# Patient Record
Sex: FEMALE | Race: OTHER | Employment: PART TIME | URBAN - METROPOLITAN AREA
[De-identification: names, ages, dates, MRNs, and addresses within clinical notes are randomized per-mention and may not be internally consistent; named-entity substitution may affect disease eponyms.]

---

## 2017-02-09 ENCOUNTER — OFFICE VISIT (OUTPATIENT)
Dept: FAMILY MEDICINE CLINIC | Facility: CLINIC | Age: 27
End: 2017-02-09

## 2017-02-09 VITALS
WEIGHT: 166 LBS | RESPIRATION RATE: 16 BRPM | DIASTOLIC BLOOD PRESSURE: 79 MMHG | HEART RATE: 78 BPM | BODY MASS INDEX: 29 KG/M2 | TEMPERATURE: 98 F | SYSTOLIC BLOOD PRESSURE: 116 MMHG

## 2017-02-09 DIAGNOSIS — J06.9 VIRAL UPPER RESPIRATORY TRACT INFECTION: Primary | ICD-10-CM

## 2017-02-09 PROCEDURE — 99212 OFFICE O/P EST SF 10 MIN: CPT | Performed by: FAMILY MEDICINE

## 2017-02-09 PROCEDURE — 99213 OFFICE O/P EST LOW 20 MIN: CPT | Performed by: FAMILY MEDICINE

## 2017-02-09 NOTE — PROGRESS NOTES
Blood pressure 116/79, pulse 78, temperature 98.1 °F (36.7 °C), temperature source Oral, resp. rate 16, weight 166 lb (75.297 kg), last menstrual period 01/21/2017, unknown if currently breastfeeding.   Patient presents today complaining of a four-day histo

## 2017-04-11 ENCOUNTER — HOSPITAL ENCOUNTER (OUTPATIENT)
Age: 27
Discharge: HOME | End: 2017-04-11
Attending: EMERGENCY MEDICINE
Payer: MEDICAID

## 2017-04-11 VITALS
HEART RATE: 64 BPM | DIASTOLIC BLOOD PRESSURE: 68 MMHG | OXYGEN SATURATION: 99 % | BODY MASS INDEX: 27.46 KG/M2 | SYSTOLIC BLOOD PRESSURE: 106 MMHG | WEIGHT: 155 LBS | HEIGHT: 63 IN | RESPIRATION RATE: 16 BRPM | TEMPERATURE: 99 F

## 2017-04-11 DIAGNOSIS — T30.0 PARTIAL THICKNESS BURNS OF MULTIPLE SITES: Primary | ICD-10-CM

## 2017-04-11 PROCEDURE — 16020 DRESS/DEBRID P-THICK BURN S: CPT

## 2017-04-11 PROCEDURE — 99213 OFFICE O/P EST LOW 20 MIN: CPT

## 2017-04-11 PROCEDURE — 99214 OFFICE O/P EST MOD 30 MIN: CPT
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
## 2017-04-12 NOTE — ED NOTES
silvadine dressing applied with thin layer of silvadine and gauze.   Patient instructed on dressing changes and would care

## 2017-04-17 ENCOUNTER — TELEPHONE (OUTPATIENT)
Dept: OBGYN CLINIC | Facility: CLINIC | Age: 27
End: 2017-04-17

## 2017-04-18 RX ORDER — NORGESTIMATE AND ETHINYL ESTRADIOL 7DAYSX3 28
1 KIT ORAL DAILY
Qty: 1 PACKAGE | Refills: 1 | Status: SHIPPED | OUTPATIENT
Start: 2017-04-18 | End: 2017-05-08

## 2017-04-18 NOTE — TELEPHONE ENCOUNTER
Tried to leave message, no voicemail. Ira Mane ordered with 1 refill per [facility] instructions.

## 2017-04-20 NOTE — TELEPHONE ENCOUNTER
No voicemail. Could not leave message. Attempted to call pt 3 times. No voicemail set up. Send letter or discontinue calling patient?

## 2017-05-08 ENCOUNTER — TELEPHONE (OUTPATIENT)
Dept: OBGYN CLINIC | Facility: CLINIC | Age: 27
End: 2017-05-08

## 2017-05-08 RX ORDER — NORGESTIMATE AND ETHINYL ESTRADIOL 7DAYSX3 28
1 KIT ORAL DAILY
Qty: 1 PACKAGE | Refills: 0 | Status: SHIPPED | OUTPATIENT
Start: 2017-05-08 | End: 2017-06-05

## 2017-05-08 NOTE — TELEPHONE ENCOUNTER
OFFICE RECEIVED FAX FROM [facility] FOR SECOND RX REQUEST FOR TRINESSA TABLETS 28S. PLACED IN NURSE BIN TO BE REVIEWED.

## 2017-05-08 NOTE — TELEPHONE ENCOUNTER
Patient is overdue for her annual exam.  My message last month was to give her a refill until her annual exam.  May send in one pack but no further refills will be given until she comes in for her annual exam.

## 2017-06-08 RX ORDER — CLOBETASOL PROPIONATE 0.5 MG/G
CREAM TOPICAL
Qty: 45 G | Refills: 1 | Status: SHIPPED | OUTPATIENT
Start: 2017-06-08 | End: 2018-05-25

## 2017-07-12 ENCOUNTER — OFFICE VISIT (OUTPATIENT)
Dept: DERMATOLOGY CLINIC | Facility: CLINIC | Age: 27
End: 2017-07-12

## 2017-07-12 DIAGNOSIS — L40.8 OTHER PSORIASIS: Primary | ICD-10-CM

## 2017-07-12 PROCEDURE — 99212 OFFICE O/P EST SF 10 MIN: CPT | Performed by: DERMATOLOGY

## 2017-07-12 PROCEDURE — 99213 OFFICE O/P EST LOW 20 MIN: CPT | Performed by: DERMATOLOGY

## 2017-07-12 PROCEDURE — 96372 THER/PROPH/DIAG INJ SC/IM: CPT | Performed by: DERMATOLOGY

## 2017-07-12 RX ORDER — AMPICILLIN 500 MG/1
500 CAPSULE ORAL 4 TIMES DAILY
Qty: 28 CAPSULE | Refills: 1 | Status: SHIPPED | OUTPATIENT
Start: 2017-07-12 | End: 2017-07-12

## 2017-07-12 RX ORDER — AMPICILLIN 500 MG/1
500 CAPSULE ORAL 2 TIMES DAILY
Qty: 28 CAPSULE | Refills: 1 | Status: SHIPPED | OUTPATIENT
Start: 2017-07-12 | End: 2018-05-25 | Stop reason: ALTCHOICE

## 2017-07-12 RX ORDER — MONTELUKAST SODIUM 10 MG/1
10 TABLET ORAL NIGHTLY
Qty: 30 TABLET | Refills: 11 | Status: SHIPPED | OUTPATIENT
Start: 2017-07-12 | End: 2018-05-25

## 2017-07-12 RX ORDER — CLOBETASOL PROPIONATE 0.46 MG/ML
1 SOLUTION TOPICAL 2 TIMES DAILY
Qty: 50 ML | Refills: 6 | Status: SHIPPED | OUTPATIENT
Start: 2017-07-12 | End: 2018-05-25

## 2017-07-12 RX ORDER — TRIAMCINOLONE ACETONIDE 40 MG/ML
40 INJECTION, SUSPENSION INTRA-ARTICULAR; INTRAMUSCULAR ONCE
Status: COMPLETED | OUTPATIENT
Start: 2017-07-12 | End: 2017-07-12

## 2017-07-12 RX ORDER — TRIAMCINOLONE ACETONIDE 40 MG/ML
40 INJECTION, SUSPENSION INTRA-ARTICULAR; INTRAMUSCULAR ONCE
Status: CANCELLED | OUTPATIENT
Start: 2017-07-12 | End: 2017-07-12

## 2017-07-12 RX ADMIN — TRIAMCINOLONE ACETONIDE 40 MG: 40 INJECTION, SUSPENSION INTRA-ARTICULAR; INTRAMUSCULAR at 19:11:00

## 2017-07-30 NOTE — PROGRESS NOTES
Lissette Matta is a 23year old female. Patient presents with:  Psoriasis: established pt, presents with exacerbation of psoriasis to \"whole body spreading to face\". pt using TAC BID with mild improvement.  pt also using clobetasol sol BID for scalp Allergies    Past Medical History:   Diagnosis Date   • Asthma     hospitalized at age 10   • Decorative tattoo    • Pregnancy    • Pregnancy    • Psoriasis      Past Surgical History:  2011:   2013:     Social History  Social History   Katherine Cook acute distress. Erythematous scaling plaques noted over the elbows knees, scattered over    Nails normal.  No joint swelling or deformity noted. No other suspicious lesions. Assessment /   Plan:   Psoriasis. Plaque-type.   10% body surface involvement Referral Orders:  No orders of the defined types were placed in this encounter. 7/30/2017  Britney Myrick      The patient indicates understanding of these issues and agrees to the plan.   The patient is asked to return as noted in follow-up as a

## 2017-09-25 ENCOUNTER — TELEPHONE (OUTPATIENT)
Dept: FAMILY MEDICINE CLINIC | Facility: CLINIC | Age: 27
End: 2017-09-25

## 2017-09-25 NOTE — TELEPHONE ENCOUNTER
Pt would like to know what her blood type is. Per pt she needs this in order to fill out a form and would like this information asap.

## 2017-09-26 NOTE — TELEPHONE ENCOUNTER
Shilpi 16, please advise on blood type order. Patient called on 09/23/2017, RALPH was questioning order. Informed RALPH testing is usually only covered when patient is having surgery. Advised patient to schedule an appt since she has not seen Shilpi 16 since 2015.

## 2018-04-26 ENCOUNTER — OFFICE VISIT (OUTPATIENT)
Dept: OBGYN CLINIC | Facility: CLINIC | Age: 28
End: 2018-04-26

## 2018-04-26 VITALS
DIASTOLIC BLOOD PRESSURE: 63 MMHG | SYSTOLIC BLOOD PRESSURE: 105 MMHG | WEIGHT: 159 LBS | BODY MASS INDEX: 28 KG/M2 | HEART RATE: 66 BPM

## 2018-04-26 DIAGNOSIS — B96.89 BV (BACTERIAL VAGINOSIS): ICD-10-CM

## 2018-04-26 DIAGNOSIS — N76.0 BV (BACTERIAL VAGINOSIS): ICD-10-CM

## 2018-04-26 DIAGNOSIS — Z01.411 ENCOUNTER FOR GYNECOLOGICAL EXAMINATION WITH ABNORMAL FINDING: ICD-10-CM

## 2018-04-26 DIAGNOSIS — N89.8 VAGINAL DISCHARGE: Primary | ICD-10-CM

## 2018-04-26 PROCEDURE — 99395 PREV VISIT EST AGE 18-39: CPT | Performed by: OBSTETRICS & GYNECOLOGY

## 2018-04-26 RX ORDER — NORGESTIMATE AND ETHINYL ESTRADIOL 7DAYSX3 28
1 KIT ORAL DAILY
Qty: 1 PACKAGE | Refills: 11 | Status: SHIPPED | OUTPATIENT
Start: 2018-04-26 | End: 2018-05-24

## 2018-04-26 RX ORDER — METRONIDAZOLE 500 MG/1
500 TABLET ORAL 2 TIMES DAILY
Qty: 14 TABLET | Refills: 0 | Status: SHIPPED | OUTPATIENT
Start: 2018-04-26 | End: 2018-05-25 | Stop reason: ALTCHOICE

## 2018-04-26 NOTE — PROGRESS NOTES
HPI:    Patient ID: Judi Rodriguez is a 33year old female. HPI  Patient here for routine exam.  Reports vaginal discharge with odor for four months. Has tried OTC medication but only helps for two or three days.  Desires to restart [employer] and [place] but states No adenopathy. Abdominal: Soft. Bowel sounds are normal. She exhibits no mass. There is no tenderness. Genitourinary: Uterus normal. No vaginal discharge found. Genitourinary Comments: Cervix:  No CMT. No lesions. Adnexa:  No adnexal masses.   NT.

## 2018-04-27 ENCOUNTER — TELEPHONE (OUTPATIENT)
Dept: OBGYN CLINIC | Facility: CLINIC | Age: 28
End: 2018-04-27

## 2018-04-27 NOTE — TELEPHONE ENCOUNTER
----- Message from Sheila Jansen MD sent at 4/27/2018  4:05 AM CDT -----  Vaginal Culture + for BV. Patient already treated with Flagyl. Notify patient.

## 2018-05-25 ENCOUNTER — OFFICE VISIT (OUTPATIENT)
Dept: FAMILY MEDICINE CLINIC | Facility: CLINIC | Age: 28
End: 2018-05-25

## 2018-05-25 VITALS
DIASTOLIC BLOOD PRESSURE: 66 MMHG | WEIGHT: 155 LBS | BODY MASS INDEX: 27.46 KG/M2 | HEART RATE: 65 BPM | SYSTOLIC BLOOD PRESSURE: 103 MMHG | HEIGHT: 63 IN

## 2018-05-25 DIAGNOSIS — Z00.00 ROUTINE PHYSICAL EXAMINATION: Primary | ICD-10-CM

## 2018-05-25 DIAGNOSIS — F32.A DEPRESSION, UNSPECIFIED DEPRESSION TYPE: ICD-10-CM

## 2018-05-25 DIAGNOSIS — L40.9 PSORIASIS: ICD-10-CM

## 2018-05-25 PROCEDURE — 99395 PREV VISIT EST AGE 18-39: CPT | Performed by: FAMILY MEDICINE

## 2018-05-25 NOTE — PROGRESS NOTES
Blood pressure 103/66, pulse 65, height 5' 3\" (1.6 m), weight 155 lb (70.3 kg), not currently breastfeeding. REASON FOR VISIT:    Dayana Cantu is a 33year old female who presents for an [address]         Patient Active Problem List: Screen If high risk No components found for: PPDINDURAT       SPECIFIC DISEASE MONITORING Internal Lab or Procedure   No disease specific diagnoses    ALLERGIES:   No Known Allergies  CURRENT MEDICATIONS:     No current outpatient prescriptions on file. distress  SKIN: no rashes, no suspicious lesions  HEENT: atraumatic, normocephalic, ears and throat are clear  EYES:PERRLA, EOMI, conjunctiva are clear.     NECK: supple, no adenopathy, no bruits  CHEST: no chest tenderness  BREAST: deferred   LUNGS: clear

## 2018-08-30 ENCOUNTER — HOSPITAL ENCOUNTER (OUTPATIENT)
Age: 28
Discharge: HOME | End: 2018-08-30
Payer: MEDICAID

## 2018-08-30 VITALS
SYSTOLIC BLOOD PRESSURE: 111 MMHG | TEMPERATURE: 98 F | HEART RATE: 87 BPM | RESPIRATION RATE: 18 BRPM | OXYGEN SATURATION: 97 % | DIASTOLIC BLOOD PRESSURE: 59 MMHG

## 2018-08-30 DIAGNOSIS — J06.9 VIRAL URI: Primary | ICD-10-CM

## 2018-08-30 LAB — POCT RAPID STREP: NEGATIVE

## 2018-08-30 PROCEDURE — 87081 CULTURE SCREEN ONLY: CPT | Performed by: NURSE PRACTITIONER

## 2018-08-30 PROCEDURE — 87430 STREP A AG IA: CPT | Performed by: NURSE PRACTITIONER

## 2018-08-30 PROCEDURE — 99204 OFFICE O/P NEW MOD 45 MIN: CPT

## 2018-08-30 PROCEDURE — 99203 OFFICE O/P NEW LOW 30 MIN: CPT

## 2018-08-30 NOTE — ED PROVIDER NOTES
Hanane Reagan is a 33year old female who presents with for chief complaint of fever, chills, nasal congestion, coryza, rhinorrhea, sore throat, cough, headache, myalgias, fatigue, malaise, tiredness along with nausea. Onset of symptoms was yesterday.   A adenopathy  Lungs: clear to auscultation bilaterally. No chest wall retractions. No respiratory distress.  No tachypnea noted  Heart: S1, S2 normal, no murmur, click, rub or gallop, regular rate and rhythm  Abdomen: soft, non-tender; bowel sounds normal; no

## 2018-08-30 NOTE — ED INITIAL ASSESSMENT (HPI)
Patient presents with cc of fever (102 max this am),headache,sinus congestion and chills/bodyaches x 2 days. Denies sore throat or cough. Niece who lives with her also ill.

## 2018-10-22 ENCOUNTER — TELEPHONE (OUTPATIENT)
Dept: FAMILY MEDICINE CLINIC | Facility: CLINIC | Age: 28
End: 2018-10-22

## 2018-10-22 DIAGNOSIS — M79.673 PAIN OF FOOT, UNSPECIFIED LATERALITY: Primary | ICD-10-CM

## 2018-10-22 NOTE — TELEPHONE ENCOUNTER
Johann Lebron Havelock,     Patient called stating she was told to make an appointment with a Podiatry as a hospital follow up. Please enter an order as she doesn't require referrals. Reason for treatment is trauma to the foot/sprang.      Thank you,   Jennifer Turner

## 2018-10-24 ENCOUNTER — HOSPITAL ENCOUNTER (OUTPATIENT)
Dept: GENERAL RADIOLOGY | Age: 28
Discharge: HOME | End: 2018-10-24
Attending: PODIATRIST
Payer: MEDICAID

## 2018-10-24 ENCOUNTER — OFFICE VISIT (OUTPATIENT)
Dept: PODIATRY CLINIC | Facility: CLINIC | Age: 28
End: 2018-10-24
Payer: MEDICAID

## 2018-10-24 DIAGNOSIS — S92.301A FRACTURE OF UNSPECIFIED METATARSAL BONE(S), RIGHT FOOT, INITIAL ENCOUNTER FOR CLOSED FRACTURE: Primary | ICD-10-CM

## 2018-10-24 DIAGNOSIS — S92.301A FRACTURE OF UNSPECIFIED METATARSAL BONE(S), RIGHT FOOT, INITIAL ENCOUNTER FOR CLOSED FRACTURE: ICD-10-CM

## 2018-10-24 PROCEDURE — 99203 OFFICE O/P NEW LOW 30 MIN: CPT | Performed by: PODIATRIST

## 2018-10-24 PROCEDURE — L4386 NON-PNEUM WALK BOOT PRE CST: HCPCS | Performed by: PODIATRIST

## 2018-10-24 PROCEDURE — 73630 X-RAY EXAM OF FOOT: CPT | Performed by: PODIATRIST

## 2018-10-24 RX ORDER — NORGESTIMATE-ETHINYL ESTRADIOL 7DAYSX3 28
TABLET ORAL
Refills: 9 | COMMUNITY
Start: 2018-09-17 | End: 2019-06-13

## 2018-10-24 RX ORDER — HYDROCODONE BITARTRATE AND ACETAMINOPHEN 5; 325 MG/1; MG/1
TABLET ORAL
Refills: 0 | COMMUNITY
Start: 2018-10-20 | End: 2019-06-13

## 2018-10-29 NOTE — PROGRESS NOTES
Eelna Kaufman is a 33year old female. Patient presents with:   Foot Pain: right foot 10/10 at night since saturday, fall pt went to [facility] ED        HPI:   This very pleasant 64-year-old female patient presents to the clinic with a chief complaint o Substance and Sexual Activity      Alcohol use: No        Alcohol/week: 0.0 oz      Drug use: No      Sexual activity: Yes        Birth control/protection: Condom    Other Topics      Concerns:         Service: Not Asked        Blood Transfusions: displaced fifth metatarsal fracture at the neck area. Alignment is reasonable should heal should not be a problem for the patient in the future if it heals in this position.     ASSESSMENT AND PLAN:   Diagnoses and all orders for this visit:    Fracture of

## 2018-11-14 ENCOUNTER — HOSPITAL ENCOUNTER (OUTPATIENT)
Dept: GENERAL RADIOLOGY | Age: 28
Discharge: HOME | End: 2018-11-14
Attending: PODIATRIST
Payer: MEDICAID

## 2018-11-14 ENCOUNTER — OFFICE VISIT (OUTPATIENT)
Dept: PODIATRY CLINIC | Facility: CLINIC | Age: 28
End: 2018-11-14
Payer: MEDICAID

## 2018-11-14 DIAGNOSIS — S92.301A FRACTURE OF UNSPECIFIED METATARSAL BONE(S), RIGHT FOOT, INITIAL ENCOUNTER FOR CLOSED FRACTURE: ICD-10-CM

## 2018-11-14 DIAGNOSIS — S92.301A FRACTURE OF UNSPECIFIED METATARSAL BONE(S), RIGHT FOOT, INITIAL ENCOUNTER FOR CLOSED FRACTURE: Primary | ICD-10-CM

## 2018-11-14 PROCEDURE — 99213 OFFICE O/P EST LOW 20 MIN: CPT | Performed by: PODIATRIST

## 2018-11-14 PROCEDURE — 73630 X-RAY EXAM OF FOOT: CPT | Performed by: PODIATRIST

## 2018-11-18 NOTE — PROGRESS NOTES
Renate Martines is a 33year old female. Patient presents with: Foot Pain: Pt is here for right foot pain with a stress fracture 5th right. Pt on crutches with a cam boot. 0 pain with boot on Pain level 4 without boot on.  Pt to go down for xrays of the ri Alcohol/week: 0.0 oz      Drug use: No      Sexual activity: Yes        Birth control/protection: Condom    Other Topics      Concerns:         Service: Not Asked        Blood Transfusions: Not Asked        Caffeine Concern: Yes          coffee,s taken they look good. Healing is progressing the patient may now ambulate in the boot without crutches or use 1 crutch for an assist if she desires.   She can continue to use the Ace wrap for some mild compression and I will follow-up with her again in 3 w

## 2018-12-05 ENCOUNTER — HOSPITAL ENCOUNTER (OUTPATIENT)
Dept: GENERAL RADIOLOGY | Age: 28
Discharge: HOME | End: 2018-12-05
Attending: PODIATRIST
Payer: MEDICAID

## 2018-12-05 ENCOUNTER — OFFICE VISIT (OUTPATIENT)
Dept: PODIATRY CLINIC | Facility: CLINIC | Age: 28
End: 2018-12-05
Payer: MEDICAID

## 2018-12-05 DIAGNOSIS — S92.301A FRACTURE OF UNSPECIFIED METATARSAL BONE(S), RIGHT FOOT, INITIAL ENCOUNTER FOR CLOSED FRACTURE: Primary | ICD-10-CM

## 2018-12-05 DIAGNOSIS — S92.301A FRACTURE OF UNSPECIFIED METATARSAL BONE(S), RIGHT FOOT, INITIAL ENCOUNTER FOR CLOSED FRACTURE: ICD-10-CM

## 2018-12-05 PROCEDURE — 73630 X-RAY EXAM OF FOOT: CPT | Performed by: PODIATRIST

## 2018-12-05 PROCEDURE — 99213 OFFICE O/P EST LOW 20 MIN: CPT | Performed by: PODIATRIST

## 2018-12-05 RX ORDER — CEPHALEXIN 500 MG/1
CAPSULE ORAL
Refills: 0 | COMMUNITY
Start: 2018-11-27 | End: 2019-04-30 | Stop reason: ALTCHOICE

## 2018-12-09 NOTE — PROGRESS NOTES
Marilyn Dangelo is a 33year old female. Patient presents with:  Fracture: right foot -- rates pain 2-3/05 with certain movements.          HPI:   Patient returns to clinic for checkup on her fifth metatarsal fracture which is located around the anatomical REVIEW OF SYSTEMS:   Review of Systems  GENERAL HEALTH: feels well otherwise  SKIN: denies any unusual skin lesions or rashes  RESPIRATORY: denies shortness of breath with exertion  CARDIOVASCULAR: denies chest pain on exertion  GI: denies abdominal pa

## 2019-01-05 ENCOUNTER — OFFICE VISIT (OUTPATIENT)
Dept: PODIATRY CLINIC | Facility: CLINIC | Age: 29
End: 2019-01-05
Payer: MEDICAID

## 2019-01-05 DIAGNOSIS — S92.301A FRACTURE OF UNSPECIFIED METATARSAL BONE(S), RIGHT FOOT, INITIAL ENCOUNTER FOR CLOSED FRACTURE: Primary | ICD-10-CM

## 2019-01-05 PROCEDURE — 99213 OFFICE O/P EST LOW 20 MIN: CPT | Performed by: PODIATRIST

## 2019-01-07 NOTE — PROGRESS NOTES
Rosaura Roblero is a 33year old female.  Patient presents with:  Fracture: R foot f/u - tates she is good, it hurts when she stays on her feet longer and is rated as 7/10 on and off         HPI:   This patient returns for follow-up still experiencing some Pt has a pacemaker: No        Pt has a defibrillator: No        Reaction to local anesthetic: No          REVIEW OF SYSTEMS:   Review of Systems  GENERAL HEALTH: feels well otherwise  SKIN: denies any unusual skin lesions or rashes  RESPIRATORY: denies esau

## 2019-02-07 ENCOUNTER — TELEPHONE (OUTPATIENT)
Dept: ORTHOPEDICS CLINIC | Facility: CLINIC | Age: 29
End: 2019-02-07

## 2019-02-07 NOTE — TELEPHONE ENCOUNTER
Outstanding orders for an xray of the foot. Foot fracture Pts appointment 1-2-19. Was to follow up if pain continued in 4 weeks with repeat xray. No follow up as of yet.

## 2019-02-09 ENCOUNTER — OFFICE VISIT (OUTPATIENT)
Dept: FAMILY MEDICINE CLINIC | Facility: CLINIC | Age: 29
End: 2019-02-09
Payer: MEDICAID

## 2019-02-09 ENCOUNTER — TELEPHONE (OUTPATIENT)
Dept: FAMILY MEDICINE CLINIC | Facility: CLINIC | Age: 29
End: 2019-02-09

## 2019-02-09 ENCOUNTER — APPOINTMENT (OUTPATIENT)
Dept: LAB | Age: 29
End: 2019-02-09
Attending: FAMILY MEDICINE
Payer: MEDICAID

## 2019-02-09 VITALS
HEART RATE: 65 BPM | HEIGHT: 63 IN | WEIGHT: 152.25 LBS | SYSTOLIC BLOOD PRESSURE: 106 MMHG | DIASTOLIC BLOOD PRESSURE: 69 MMHG | BODY MASS INDEX: 26.98 KG/M2

## 2019-02-09 DIAGNOSIS — M54.50 ACUTE BILATERAL LOW BACK PAIN WITHOUT SCIATICA: ICD-10-CM

## 2019-02-09 DIAGNOSIS — M54.50 ACUTE BILATERAL LOW BACK PAIN WITHOUT SCIATICA: Primary | ICD-10-CM

## 2019-02-09 LAB — HCG SERPL QL: NEGATIVE

## 2019-02-09 PROCEDURE — 99212 OFFICE O/P EST SF 10 MIN: CPT | Performed by: FAMILY MEDICINE

## 2019-02-09 PROCEDURE — 99213 OFFICE O/P EST LOW 20 MIN: CPT | Performed by: FAMILY MEDICINE

## 2019-02-09 PROCEDURE — 84703 CHORIONIC GONADOTROPIN ASSAY: CPT

## 2019-02-09 PROCEDURE — 36415 COLL VENOUS BLD VENIPUNCTURE: CPT

## 2019-02-09 RX ORDER — METHYLPREDNISOLONE 4 MG/1
TABLET ORAL
Qty: 1 KIT | Refills: 1 | Status: SHIPPED | OUTPATIENT
Start: 2019-02-09 | End: 2019-04-30 | Stop reason: ALTCHOICE

## 2019-02-09 RX ORDER — METHYLPREDNISOLONE 4 MG/1
TABLET ORAL
Qty: 1 KIT | Refills: 1 | Status: SHIPPED | OUTPATIENT
Start: 2019-02-09 | End: 2019-02-09

## 2019-02-09 NOTE — PROGRESS NOTES
Blood pressure 106/69, pulse 65, height 5' 3\" (1.6 m), weight 152 lb 4 oz (69.1 kg), not currently breastfeeding. Patient presents today complaining of right buttock and thigh pain for the past month.   She had broken her foot 2 months ago and was weari

## 2019-02-12 NOTE — TELEPHONE ENCOUNTER
Patient returned call advised of result notes per  [facility], patient verbalized understanding, no further questions.

## 2019-04-30 ENCOUNTER — OFFICE VISIT (OUTPATIENT)
Dept: FAMILY MEDICINE CLINIC | Facility: CLINIC | Age: 29
End: 2019-04-30
Payer: MEDICAID

## 2019-04-30 VITALS
HEART RATE: 70 BPM | WEIGHT: 154.69 LBS | TEMPERATURE: 98 F | HEIGHT: 63 IN | DIASTOLIC BLOOD PRESSURE: 65 MMHG | BODY MASS INDEX: 27.41 KG/M2 | RESPIRATION RATE: 15 BRPM | SYSTOLIC BLOOD PRESSURE: 103 MMHG

## 2019-04-30 DIAGNOSIS — K64.9 HEMORRHOIDS, UNSPECIFIED HEMORRHOID TYPE: Primary | ICD-10-CM

## 2019-04-30 PROCEDURE — 99212 OFFICE O/P EST SF 10 MIN: CPT | Performed by: FAMILY MEDICINE

## 2019-04-30 NOTE — PROGRESS NOTES
Blood pressure 103/65, pulse 70, temperature 98 °F (36.7 °C), temperature source Oral, resp. rate 15, height 5' 3\" (1.6 m), weight 154 lb 11.2 oz (70.2 kg), not currently breastfeeding.     COMPLAINING OF LARGE HEMORRHOID NO PAIN AT THIS TIME SHE HAS HAD I

## 2019-06-13 ENCOUNTER — OFFICE VISIT (OUTPATIENT)
Dept: FAMILY MEDICINE CLINIC | Facility: CLINIC | Age: 29
End: 2019-06-13
Payer: MEDICAID

## 2019-06-13 VITALS
HEIGHT: 63 IN | DIASTOLIC BLOOD PRESSURE: 69 MMHG | SYSTOLIC BLOOD PRESSURE: 109 MMHG | WEIGHT: 154 LBS | BODY MASS INDEX: 27.29 KG/M2 | HEART RATE: 64 BPM

## 2019-06-13 DIAGNOSIS — M76.31 IT BAND SYNDROME, RIGHT: ICD-10-CM

## 2019-06-13 DIAGNOSIS — N89.8 VAGINAL DISCHARGE: Primary | ICD-10-CM

## 2019-06-13 DIAGNOSIS — L85.8 KERATOSIS PILARIS: ICD-10-CM

## 2019-06-13 DIAGNOSIS — R22.9 MULTIPLE SKIN NODULES: ICD-10-CM

## 2019-06-13 PROBLEM — J06.9 VIRAL UPPER RESPIRATORY TRACT INFECTION: Status: RESOLVED | Noted: 2017-02-09 | Resolved: 2019-06-13

## 2019-06-13 PROCEDURE — 99214 OFFICE O/P EST MOD 30 MIN: CPT | Performed by: NURSE PRACTITIONER

## 2019-06-13 RX ORDER — METRONIDAZOLE 500 MG/1
500 TABLET ORAL 2 TIMES DAILY
Qty: 14 TABLET | Refills: 0 | Status: SHIPPED | OUTPATIENT
Start: 2019-06-13 | End: 2019-06-20

## 2019-06-13 NOTE — PROGRESS NOTES
HPI  Pt here for vaginal d/c and strong odor for the past 7 months. Pt states it is thick white-worse when walking  Notices odor to vaginal d/c. Denies vaginal irritation, itching or pain with urination. Has similar s/s last year-was treated with flagyl.  H Transportation needs:        Medical: Not on file        Non-medical: Not on file    Tobacco Use      Smoking status: Former Smoker        Types: Cigarettes        Quit date: 4/26/2016        Years since quitting: 3.1      Smokeless tobacco: Never Used No Known Allergies    Physical Exam   Nursing note and vitals reviewed. Constitutional: She appears well-developed and well-nourished. Cardiovascular: Normal rate, regular rhythm and normal heart sounds.     Pulmonary/Chest: Effort normal and breath charlene

## 2019-06-13 NOTE — PATIENT INSTRUCTIONS
Preventing Vaginal Infection  These steps can help you stay comfortable during treatment of a vaginal infection. They also help prevent vaginal infections in the future.   Keeping a healthy balance  Factors that change the normal balance in the vagina can © 2928-7634 [employer] [address] [facility] [facility] [place]. All rights reserved. This information is not intended as a substitute for professional medical care. Always follow your healthcare professional's instructions.     KERATOSIS P Teens and adults: Upper arms, thighs (front), and buttocks    Some people develop so many bumps on their skin that the bumps extend to their lower legs and forearms. Who gets keratosis pilaris?   People of all ages and races have this common skin condition A moisturizer: A cream or ointment works best. Apply it after bathing and gently massage it into the skin with keratosis pilaris 2 or 3 times a day. What causes keratosis pilaris? Keratosis pilaris is not contagious.  We get keratosis pilaris when dead Your dermatologist may also prescribe a medicine that will remove dead skin cells.  Medicine that can help often contains one of the following ingredients:    Alpha hydroxyl acid  Glycolic acid  Lactic acid  A retinoid (adapalene, retinol, tazarotene, treti About the maintenance plan: Treatment cannot cure keratosis pilaris, so you’ll need to treat your skin to keep the bumps under control. Your maintenance plan may be as simple as using the medicine twice a week instead of every day.  Another option may be to Stephanie on moisturizer: Using a keratolyic dries the skin, so you’ll want to apply a moisturizer afterwards. Dermatologists recommend using an oil-free cream or ointment to help prevent clogged pores.     You want to apply the moisturizer:    • After bathin

## 2019-06-14 NOTE — ASSESSMENT & PLAN NOTE
Gentle stretching  Ibuprofen 600 mg I po tid prn  Ice to area 20 min 4-6 times per day  Massage to the area

## 2019-06-26 ENCOUNTER — OFFICE VISIT (OUTPATIENT)
Dept: SURGERY | Facility: CLINIC | Age: 29
End: 2019-06-26
Payer: MEDICAID

## 2019-06-26 VITALS
BODY MASS INDEX: 26.58 KG/M2 | HEART RATE: 71 BPM | WEIGHT: 150 LBS | DIASTOLIC BLOOD PRESSURE: 69 MMHG | HEIGHT: 63 IN | TEMPERATURE: 97 F | SYSTOLIC BLOOD PRESSURE: 101 MMHG

## 2019-06-26 DIAGNOSIS — K64.4 ANAL SKIN TAG: ICD-10-CM

## 2019-06-26 DIAGNOSIS — K64.9 HEMORRHOIDS, UNSPECIFIED HEMORRHOID TYPE: Primary | ICD-10-CM

## 2019-06-26 DIAGNOSIS — K64.4 EXTERNAL HEMORRHOID: ICD-10-CM

## 2019-06-26 PROCEDURE — 99244 OFF/OP CNSLTJ NEW/EST MOD 40: CPT | Performed by: SURGERY

## 2019-06-26 PROCEDURE — 99212 OFFICE O/P EST SF 10 MIN: CPT | Performed by: SURGERY

## 2019-06-27 ENCOUNTER — HOSPITAL ENCOUNTER (OUTPATIENT)
Dept: ULTRASOUND IMAGING | Facility: HOSPITAL | Age: 29
Discharge: HOME | End: 2019-06-27
Attending: NURSE PRACTITIONER
Payer: MEDICAID

## 2019-06-27 DIAGNOSIS — R22.9 MULTIPLE SKIN NODULES: ICD-10-CM

## 2019-06-27 PROCEDURE — 76882 US LMTD JT/FCL EVL NVASC XTR: CPT | Performed by: NURSE PRACTITIONER

## 2019-06-28 ENCOUNTER — TELEPHONE (OUTPATIENT)
Dept: OTHER | Age: 29
End: 2019-06-28

## 2019-06-28 NOTE — H&P (VIEW-ONLY)
History and Physical      Slava Guo is a 33year old female. HPI   Patient presents with:  Hemorrhoids: referred from Dr. Maedlin López for hemorrhoids. Pt reports they started with pregnancy about six years ago.  Intermittent and recurrent and wo 101/69 (BP Location: Left arm)   Pulse 71   Temp 97.1 °F (36.2 °C)   Ht 5' 3\" (1.6 m)   Wt 150 lb (68 kg)   LMP 06/15/2019   BMI 26.57 kg/m²  Patient's last menstrual period was 06/15/2019.   Constitutional: appears well hydrated alert and responsive no ac instructed. Pt would like to pursue excision of the skin tag / hemorrhoid column, and understands risks, including recurrent disease if constipation or straining in the future.        6/27/2019  Cha Stevens MD

## 2019-06-28 NOTE — H&P
History and Physical      Geri Raines is a 33year old female. HPI   Patient presents with:  Hemorrhoids: referred from Dr. Alfonso Chatman for hemorrhoids. Pt reports they started with pregnancy about six years ago.  Intermittent and recurrent and wo 101/69 (BP Location: Left arm)   Pulse 71   Temp 97.1 °F (36.2 °C)   Ht 5' 3\" (1.6 m)   Wt 150 lb (68 kg)   LMP 06/15/2019   BMI 26.57 kg/m²  Patient's last menstrual period was 06/15/2019.   Constitutional: appears well hydrated alert and responsive no ac instructed. Pt would like to pursue excision of the skin tag / hemorrhoid column, and understands risks, including recurrent disease if constipation or straining in the future.        6/27/2019  Andre Concepcion MD

## 2019-07-02 ENCOUNTER — TELEPHONE (OUTPATIENT)
Dept: SURGERY | Facility: CLINIC | Age: 29
End: 2019-07-02

## 2019-07-02 NOTE — TELEPHONE ENCOUNTER
Please provide a proper CPT code for hemorrhoidectomy as opposed to generic code 6613 MD On-Line.      Thank you, Barron

## 2019-07-23 ENCOUNTER — HOSPITAL ENCOUNTER (OUTPATIENT)
Facility: HOSPITAL | Age: 29
Setting detail: HOSPITAL OUTPATIENT SURGERY
Discharge: HOME | End: 2019-07-23
Attending: SURGERY | Admitting: SURGERY
Payer: MEDICAID

## 2019-07-23 ENCOUNTER — ANESTHESIA EVENT (OUTPATIENT)
Dept: SURGERY | Facility: HOSPITAL | Age: 29
End: 2019-07-23
Payer: MEDICAID

## 2019-07-23 ENCOUNTER — ANESTHESIA (OUTPATIENT)
Dept: SURGERY | Facility: HOSPITAL | Age: 29
End: 2019-07-23
Payer: MEDICAID

## 2019-07-23 VITALS
OXYGEN SATURATION: 100 % | RESPIRATION RATE: 16 BRPM | BODY MASS INDEX: 26.75 KG/M2 | DIASTOLIC BLOOD PRESSURE: 68 MMHG | SYSTOLIC BLOOD PRESSURE: 115 MMHG | HEIGHT: 63 IN | WEIGHT: 151 LBS | HEART RATE: 72 BPM | TEMPERATURE: 97 F

## 2019-07-23 DIAGNOSIS — K64.9 HEMORRHOIDS, UNSPECIFIED HEMORRHOID TYPE: ICD-10-CM

## 2019-07-23 LAB — B-HCG UR QL: NEGATIVE

## 2019-07-23 PROCEDURE — 06BY3ZC EXCISION OF HEMORRHOIDAL PLEXUS, PERCUTANEOUS APPROACH: ICD-10-PCS | Performed by: SURGERY

## 2019-07-23 PROCEDURE — 46255 REMOVE INT/EXT HEM 1 GROUP: CPT | Performed by: SURGERY

## 2019-07-23 RX ORDER — FAMOTIDINE 20 MG/1
20 TABLET ORAL ONCE
Status: DISCONTINUED | OUTPATIENT
Start: 2019-07-23 | End: 2019-07-23 | Stop reason: HOSPADM

## 2019-07-23 RX ORDER — ACETAMINOPHEN 500 MG
1000 TABLET ORAL ONCE
Status: COMPLETED | OUTPATIENT
Start: 2019-07-23 | End: 2019-07-23

## 2019-07-23 RX ORDER — DEXAMETHASONE SODIUM PHOSPHATE 4 MG/ML
VIAL (ML) INJECTION AS NEEDED
Status: DISCONTINUED | OUTPATIENT
Start: 2019-07-23 | End: 2019-07-23 | Stop reason: SURG

## 2019-07-23 RX ORDER — ROCURONIUM BROMIDE 10 MG/ML
INJECTION, SOLUTION INTRAVENOUS AS NEEDED
Status: DISCONTINUED | OUTPATIENT
Start: 2019-07-23 | End: 2019-07-23 | Stop reason: SURG

## 2019-07-23 RX ORDER — PROCHLORPERAZINE EDISYLATE 5 MG/ML
5 INJECTION INTRAMUSCULAR; INTRAVENOUS ONCE AS NEEDED
Status: DISCONTINUED | OUTPATIENT
Start: 2019-07-23 | End: 2019-07-23

## 2019-07-23 RX ORDER — HYDROCODONE BITARTRATE AND ACETAMINOPHEN 5; 325 MG/1; MG/1
2 TABLET ORAL AS NEEDED
Status: DISCONTINUED | OUTPATIENT
Start: 2019-07-23 | End: 2019-07-23

## 2019-07-23 RX ORDER — METOCLOPRAMIDE 10 MG/1
10 TABLET ORAL ONCE
Status: DISCONTINUED | OUTPATIENT
Start: 2019-07-23 | End: 2019-07-23 | Stop reason: HOSPADM

## 2019-07-23 RX ORDER — HYDROMORPHONE HYDROCHLORIDE 1 MG/ML
0.6 INJECTION, SOLUTION INTRAMUSCULAR; INTRAVENOUS; SUBCUTANEOUS EVERY 5 MIN PRN
Status: DISCONTINUED | OUTPATIENT
Start: 2019-07-23 | End: 2019-07-23

## 2019-07-23 RX ORDER — HALOPERIDOL 5 MG/ML
0.25 INJECTION INTRAMUSCULAR ONCE AS NEEDED
Status: DISCONTINUED | OUTPATIENT
Start: 2019-07-23 | End: 2019-07-23

## 2019-07-23 RX ORDER — ONDANSETRON 2 MG/ML
4 INJECTION INTRAMUSCULAR; INTRAVENOUS ONCE AS NEEDED
Status: DISCONTINUED | OUTPATIENT
Start: 2019-07-23 | End: 2019-07-23

## 2019-07-23 RX ORDER — HYDROCODONE BITARTRATE AND ACETAMINOPHEN 5; 325 MG/1; MG/1
1 TABLET ORAL AS NEEDED
Status: COMPLETED | OUTPATIENT
Start: 2019-07-23 | End: 2019-07-23

## 2019-07-23 RX ORDER — DIBUCAINE 0.28 G/28G
OINTMENT TOPICAL AS NEEDED
Status: DISCONTINUED | OUTPATIENT
Start: 2019-07-23 | End: 2019-07-23 | Stop reason: HOSPADM

## 2019-07-23 RX ORDER — HYDROCODONE BITARTRATE AND ACETAMINOPHEN 5; 325 MG/1; MG/1
1 TABLET ORAL AS NEEDED
Status: DISCONTINUED | OUTPATIENT
Start: 2019-07-23 | End: 2019-07-23

## 2019-07-23 RX ORDER — MIDAZOLAM HYDROCHLORIDE 1 MG/ML
INJECTION INTRAMUSCULAR; INTRAVENOUS AS NEEDED
Status: DISCONTINUED | OUTPATIENT
Start: 2019-07-23 | End: 2019-07-23 | Stop reason: SURG

## 2019-07-23 RX ORDER — CEFAZOLIN SODIUM/WATER 2 G/20 ML
SYRINGE (ML) INTRAVENOUS AS NEEDED
Status: DISCONTINUED | OUTPATIENT
Start: 2019-07-23 | End: 2019-07-23 | Stop reason: SURG

## 2019-07-23 RX ORDER — MORPHINE SULFATE 4 MG/ML
4 INJECTION, SOLUTION INTRAMUSCULAR; INTRAVENOUS EVERY 10 MIN PRN
Status: DISCONTINUED | OUTPATIENT
Start: 2019-07-23 | End: 2019-07-23

## 2019-07-23 RX ORDER — MORPHINE SULFATE 10 MG/ML
6 INJECTION, SOLUTION INTRAMUSCULAR; INTRAVENOUS EVERY 10 MIN PRN
Status: DISCONTINUED | OUTPATIENT
Start: 2019-07-23 | End: 2019-07-23

## 2019-07-23 RX ORDER — HYDROCODONE BITARTRATE AND ACETAMINOPHEN 5; 325 MG/1; MG/1
2 TABLET ORAL AS NEEDED
Status: COMPLETED | OUTPATIENT
Start: 2019-07-23 | End: 2019-07-23

## 2019-07-23 RX ORDER — MORPHINE SULFATE 2 MG/ML
2 INJECTION, SOLUTION INTRAMUSCULAR; INTRAVENOUS EVERY 10 MIN PRN
Status: DISCONTINUED | OUTPATIENT
Start: 2019-07-23 | End: 2019-07-23

## 2019-07-23 RX ORDER — HYDROMORPHONE HYDROCHLORIDE 1 MG/ML
0.4 INJECTION, SOLUTION INTRAMUSCULAR; INTRAVENOUS; SUBCUTANEOUS EVERY 5 MIN PRN
Status: DISCONTINUED | OUTPATIENT
Start: 2019-07-23 | End: 2019-07-23

## 2019-07-23 RX ORDER — HYDROMORPHONE HYDROCHLORIDE 1 MG/ML
0.2 INJECTION, SOLUTION INTRAMUSCULAR; INTRAVENOUS; SUBCUTANEOUS EVERY 5 MIN PRN
Status: DISCONTINUED | OUTPATIENT
Start: 2019-07-23 | End: 2019-07-23

## 2019-07-23 RX ORDER — DOCUSATE SODIUM 100 MG/1
100 CAPSULE, LIQUID FILLED ORAL 2 TIMES DAILY
Qty: 50 CAPSULE | Refills: 0 | Status: SHIPPED | OUTPATIENT
Start: 2019-07-23 | End: 2019-08-13

## 2019-07-23 RX ORDER — SODIUM CHLORIDE, SODIUM LACTATE, POTASSIUM CHLORIDE, CALCIUM CHLORIDE 600; 310; 30; 20 MG/100ML; MG/100ML; MG/100ML; MG/100ML
INJECTION, SOLUTION INTRAVENOUS CONTINUOUS
Status: DISCONTINUED | OUTPATIENT
Start: 2019-07-23 | End: 2019-07-23

## 2019-07-23 RX ORDER — NALOXONE HYDROCHLORIDE 0.4 MG/ML
80 INJECTION, SOLUTION INTRAMUSCULAR; INTRAVENOUS; SUBCUTANEOUS AS NEEDED
Status: DISCONTINUED | OUTPATIENT
Start: 2019-07-23 | End: 2019-07-23

## 2019-07-23 RX ORDER — ONDANSETRON 2 MG/ML
INJECTION INTRAMUSCULAR; INTRAVENOUS AS NEEDED
Status: DISCONTINUED | OUTPATIENT
Start: 2019-07-23 | End: 2019-07-23 | Stop reason: SURG

## 2019-07-23 RX ORDER — HYDROCODONE BITARTRATE AND ACETAMINOPHEN 5; 325 MG/1; MG/1
1 TABLET ORAL EVERY 4 HOURS PRN
Qty: 20 TABLET | Refills: 0 | Status: SHIPPED | OUTPATIENT
Start: 2019-07-23 | End: 2019-08-13

## 2019-07-23 RX ORDER — GLYCOPYRROLATE 0.2 MG/ML
INJECTION INTRAMUSCULAR; INTRAVENOUS AS NEEDED
Status: DISCONTINUED | OUTPATIENT
Start: 2019-07-23 | End: 2019-07-23 | Stop reason: SURG

## 2019-07-23 RX ORDER — LIDOCAINE HYDROCHLORIDE 10 MG/ML
INJECTION, SOLUTION EPIDURAL; INFILTRATION; INTRACAUDAL; PERINEURAL AS NEEDED
Status: DISCONTINUED | OUTPATIENT
Start: 2019-07-23 | End: 2019-07-23 | Stop reason: SURG

## 2019-07-23 RX ORDER — NEOSTIGMINE METHYLSULFATE 0.5 MG/ML
INJECTION INTRAVENOUS AS NEEDED
Status: DISCONTINUED | OUTPATIENT
Start: 2019-07-23 | End: 2019-07-23 | Stop reason: SURG

## 2019-07-23 RX ADMIN — ROCURONIUM BROMIDE 30 MG: 10 INJECTION, SOLUTION INTRAVENOUS at 07:41:00

## 2019-07-23 RX ADMIN — NEOSTIGMINE METHYLSULFATE 3.5 MG: 0.5 INJECTION INTRAVENOUS at 08:18:00

## 2019-07-23 RX ADMIN — LIDOCAINE HYDROCHLORIDE 25 MG: 10 INJECTION, SOLUTION EPIDURAL; INFILTRATION; INTRACAUDAL; PERINEURAL at 07:40:00

## 2019-07-23 RX ADMIN — DEXAMETHASONE SODIUM PHOSPHATE 4 MG: 4 MG/ML VIAL (ML) INJECTION at 08:00:00

## 2019-07-23 RX ADMIN — ONDANSETRON 4 MG: 2 INJECTION INTRAMUSCULAR; INTRAVENOUS at 08:00:00

## 2019-07-23 RX ADMIN — CEFAZOLIN SODIUM/WATER 2 G: 2 G/20 ML SYRINGE (ML) INTRAVENOUS at 07:52:00

## 2019-07-23 RX ADMIN — SODIUM CHLORIDE, SODIUM LACTATE, POTASSIUM CHLORIDE, CALCIUM CHLORIDE: 600; 310; 30; 20 INJECTION, SOLUTION INTRAVENOUS at 08:29:00

## 2019-07-23 RX ADMIN — GLYCOPYRROLATE 0.7 MG: 0.2 INJECTION INTRAMUSCULAR; INTRAVENOUS at 08:18:00

## 2019-07-23 RX ADMIN — MIDAZOLAM HYDROCHLORIDE 2 MG: 1 INJECTION INTRAMUSCULAR; INTRAVENOUS at 07:36:00

## 2019-07-23 NOTE — INTERVAL H&P NOTE
Pre-op Diagnosis: Hemorrhoids, unspecified hemorrhoid type [K64.9]    The above referenced H&P was reviewed by Kong Cheema MD on 7/23/2019, the patient was examined and no significant changes have occurred in the patient's condition since the H&P was per

## 2019-07-23 NOTE — ANESTHESIA PROCEDURE NOTES
Airway  Date/Time: 7/23/2019 7:45 AM  Urgency: elective    Airway not difficult    General Information and Staff    Patient location during procedure: OR  Anesthesiologist: Yandel Ríos MD  Resident/CRNA: Cadence Greenfield CRNA  Performed: MAGDALENA

## 2019-07-23 NOTE — ANESTHESIA PREPROCEDURE EVALUATION
Anesthesia PreOp Note    HPI:     Sierra Metcalf is a 33year old female who presents for preoperative consultation requested by: Danielle Michel MD    Date of Surgery: 7/23/2019    Procedure(s):   HEMORRHOIDECTOMY  Indication: Hemorrhoids, unspecified hemo Grandmother    • Genetic Disease Maternal Uncle         muscular dystrophy     Social History    Socioeconomic History      Marital status: Single      Spouse name: Not on file      Number of children: 2      Years of education: Not on file      Highest ed Asked        Back Care: Not Asked        Exercise: Not Asked        Bike Helmet: Not Asked        Seat Belt: Not Asked        Self-Exams: Not Asked        Grew up on a farm: Not Asked        History of tanning: Not Asked        Outdoor occupation: Not Aske planned.   Jack Corey  7/23/2019 7:12 AM

## 2019-07-23 NOTE — OPERATIVE REPORT
[facility]    PATIENT'S NAME: Juan Doyle   ATTENDING PHYSICIAN: Connor Daniels MD   OPERATING PHYSICIAN: Connor Daniels MD   PATIENT ACCOUNT#:   [id number]    LOCATION:  [facility] PACU [address] [place] [address]  MEDICAL RECORD #:   [id number]       DATE hemostasis was achieved. The anal canal was packed with Gel-Foam and dibucaine ointment. This was covered with an ABD pad and mesh panties. Overall, the patient tolerated the procedure well.   She was extubated and taken to the recovery room in stable co

## 2019-07-23 NOTE — ANESTHESIA POSTPROCEDURE EVALUATION
Patient: Maeve Mondragon    Procedure Summary     Date:  07/23/19 Room / Location:  [facility] OR 04 / [facility] OR    Anesthesia Start:  6286 Anesthesia Stop:  2472    Procedure:  [id number] (N/A ) Diagnosis:       Hemorrhoids, unspecified hemorrhoid t

## 2019-07-23 NOTE — BRIEF OP NOTE
Pre-Operative Diagnosis: Hemorrhoids, unspecified hemorrhoid type [K64.9]     Post-Operative Diagnosis: Hemorrhoids, unspecified hemorrhoid type [K64.9]      Procedure Performed:   Procedure(s):  hemorrhoidectomy    Surgeon(s) and Role:     Chidi Tee

## 2019-07-29 ENCOUNTER — TELEPHONE (OUTPATIENT)
Dept: SURGERY | Facility: CLINIC | Age: 29
End: 2019-07-29

## 2019-07-29 NOTE — TELEPHONE ENCOUNTER
Pt had sx on 7/23, states she has not had a bowel movement since then and the pain remains the same. Pls call thank you.

## 2019-07-29 NOTE — TELEPHONE ENCOUNTER
Advised patient to increase her stool softeners to three times daily, her Miralax to twice daily and to drink prune juice with each meal.  Also advised patient to apply ice to rectal area 20 minutes on, and 20 minutes off.   Also advised patient to call the

## 2019-08-07 ENCOUNTER — OFFICE VISIT (OUTPATIENT)
Dept: SURGERY | Facility: CLINIC | Age: 29
End: 2019-08-07
Payer: MEDICAID

## 2019-08-07 DIAGNOSIS — K64.8 INTERNAL HEMORRHOIDS WITH COMPLICATION: ICD-10-CM

## 2019-08-07 DIAGNOSIS — K64.4 ANAL SKIN TAG: Primary | ICD-10-CM

## 2019-08-07 PROCEDURE — 99024 POSTOP FOLLOW-UP VISIT: CPT | Performed by: SURGERY

## 2019-08-08 ENCOUNTER — TELEPHONE (OUTPATIENT)
Dept: SURGERY | Facility: CLINIC | Age: 29
End: 2019-08-08

## 2019-08-08 NOTE — TELEPHONE ENCOUNTER
Pt handed me papers for FMLA. , to be completed and signed . Sent to [employer] and placed in bin @ [address] [place], [place] [address]t. [employer]W was signed. $ 25 was not collected.

## 2019-08-08 NOTE — PROGRESS NOTES
Postoperative Patient Follow-up      8/8/2019    Cooper Browning 33year old      HPI  Patient presents with:  Post-Op: Pt here for 1st post op s/p hemorrhoidectomy on 7/23/19. Pt c/o intermittent pressure to rectum.   Pt states after a bm discomfort can l

## 2019-08-09 NOTE — TELEPHONE ENCOUNTER
Disability form for Dr. Zoraida Neely received in Forms dept+ FCR+ Signed release. Logged for processing.  NK

## 2019-08-13 ENCOUNTER — OFFICE VISIT (OUTPATIENT)
Dept: FAMILY MEDICINE CLINIC | Facility: CLINIC | Age: 29
End: 2019-08-13
Payer: MEDICAID

## 2019-08-13 VITALS
BODY MASS INDEX: 26.75 KG/M2 | SYSTOLIC BLOOD PRESSURE: 100 MMHG | HEART RATE: 59 BPM | WEIGHT: 151 LBS | HEIGHT: 63 IN | DIASTOLIC BLOOD PRESSURE: 66 MMHG

## 2019-08-13 DIAGNOSIS — Z30.013 ENCOUNTER FOR INITIAL PRESCRIPTION OF INJECTABLE CONTRACEPTIVE: Primary | ICD-10-CM

## 2019-08-13 LAB
CONTROL LINE PRESENT WITH A CLEAR BACKGROUND (YES/NO): YES YES/NO
KIT LOT #: NORMAL NUMERIC

## 2019-08-13 PROCEDURE — 96372 THER/PROPH/DIAG INJ SC/IM: CPT | Performed by: NURSE PRACTITIONER

## 2019-08-13 PROCEDURE — 99213 OFFICE O/P EST LOW 20 MIN: CPT | Performed by: NURSE PRACTITIONER

## 2019-08-13 PROCEDURE — 81025 URINE PREGNANCY TEST: CPT | Performed by: NURSE PRACTITIONER

## 2019-08-13 RX ORDER — MEDROXYPROGESTERONE ACETATE 150 MG/ML
150 INJECTION, SUSPENSION INTRAMUSCULAR
Status: CANCELLED | OUTPATIENT
Start: 2019-08-13

## 2019-08-13 RX ORDER — MEDROXYPROGESTERONE ACETATE 150 MG/ML
150 INJECTION, SUSPENSION INTRAMUSCULAR ONCE
Status: DISCONTINUED | OUTPATIENT
Start: 2019-08-13 | End: 2019-08-13

## 2019-08-13 RX ORDER — MEDROXYPROGESTERONE ACETATE 150 MG/ML
150 INJECTION, SUSPENSION INTRAMUSCULAR
Status: DISCONTINUED | OUTPATIENT
Start: 2019-08-13 | End: 2020-12-03

## 2019-08-13 RX ADMIN — MEDROXYPROGESTERONE ACETATE 150 MG: 150 INJECTION, SUSPENSION INTRAMUSCULAR at 10:00:00

## 2019-08-13 NOTE — PATIENT INSTRUCTIONS
Estradiol injection  Brand Names: Delestrogen, Gynogen [place]  ¿Qué es naima medicamento? El ESTRADIOL es un estrógeno. Se utiliza para tratar los síntomas de niveles bajos de hormonas en mujeres que están experimentando la menopausia.  Se Mcintosh Simpler par Efectos secundarios que, por lo general, no requieren atención médica (debe informarlos a zelaya médico o a zelaya profesional de la robby si persisten o si son molestos):  · caída del ranjan  · aumento de apetito o sed  · aumento de descargas de orina  · síntoma · sandra reacción alérgica o inusual a los estrógenos, otras hormonas, otros medicamentos, alimentos, colorantes o conservantes  · si está embarazada o buscando quedar embarazada  · si está amamantando a un bebé  ¿A qué keli estar atento al usar BragBet Naima medicamento incrementa el riesgo de desarrollar sandra enfermedad (hiperplasia del endometrio) que puede derivar en cáncer del revestimiento del útero.  Al tyrese otra hormona llamada progestina juntamente con naima medicamento, se disminuye el riesgo de de

## 2019-08-13 NOTE — ASSESSMENT & PLAN NOTE
hcg urine-negative  Depo 150 mg im today  F/u within window    Take caltrate D daily   Please let me know if you have and questions or concerns.

## 2019-08-13 NOTE — PROGRESS NOTES
HPI  Pt here to discuss birth control-has been on depo in the past and would like to restart. Partner is in [facility] and will be going out to visit him next month.        No fmh breast cancer  Review of Systems   Constitutional: Negative for activity c Socioeconomic History      Marital status: Single      Spouse name: Not on file      Number of children: 2      Years of education: Not on file      Highest education level: Not on file    Occupational History      Occupation:         [employer] Seat Belt: Not Asked        Self-Exams: Not Asked        Grew up on a farm: Not Asked        History of tanning: Not Asked        Outdoor occupation: Not Asked        Pt has a pacemaker: No        Pt has a defibrillator: No        Breast feeding: Not Asked within window    Take caltrate D daily   Please let me know if you have and questions or concerns.             Relevant Medications    medroxyPROGESTERone Acetate (DEPO-PROVERA) 150 MG/ML injection 150 mg    Other Relevant Orders    URINE PREGNANCY TEST

## 2019-09-03 NOTE — TELEPHONE ENCOUNTER
Dr. Lovell Care    Please sign off on form:  -Highlight the patient and hit \"Chart\" button. -In Chart Review, w/in the Encounter tab - click 1 time on the Telephone call encounter for 8/8/19.  Scroll down the telephone encounter.  -Click \"scan on\" blue Hype

## 2019-09-04 NOTE — TELEPHONE ENCOUNTER
Faxed disab form to [place] - [phone]. Mailed copy to pt. Notified via [address] [place] [address] [address].

## 2019-09-16 ENCOUNTER — OFFICE VISIT (OUTPATIENT)
Dept: FAMILY MEDICINE CLINIC | Facility: CLINIC | Age: 29
End: 2019-09-16
Payer: MEDICAID

## 2019-09-16 VITALS
HEIGHT: 63 IN | RESPIRATION RATE: 16 BRPM | SYSTOLIC BLOOD PRESSURE: 98 MMHG | TEMPERATURE: 98 F | HEART RATE: 61 BPM | WEIGHT: 151 LBS | BODY MASS INDEX: 26.75 KG/M2 | DIASTOLIC BLOOD PRESSURE: 62 MMHG

## 2019-09-16 DIAGNOSIS — N60.01 BREAST CYST, RIGHT: Primary | ICD-10-CM

## 2019-09-16 PROCEDURE — 99213 OFFICE O/P EST LOW 20 MIN: CPT | Performed by: FAMILY MEDICINE

## 2019-09-16 RX ORDER — NAPROXEN 500 MG/1
500 TABLET ORAL 2 TIMES DAILY WITH MEALS
Qty: 30 TABLET | Refills: 0 | Status: SHIPPED | OUTPATIENT
Start: 2019-09-16 | End: 2019-10-01

## 2019-09-16 NOTE — PROGRESS NOTES
Rt breast lump 2 weeks  Never had before   Painful  \"I saw it when I was laying down. \"    Exam  patient examined with CARLOS White  Present  Examination of the breast showed cystic mass right upper outer breast at 10 o'clock position right s

## 2019-11-17 ENCOUNTER — HOSPITAL ENCOUNTER (OUTPATIENT)
Age: 29
Discharge: HOME | End: 2019-11-17
Attending: FAMILY MEDICINE
Payer: MEDICAID

## 2019-11-17 ENCOUNTER — APPOINTMENT (OUTPATIENT)
Dept: GENERAL RADIOLOGY | Age: 29
End: 2019-11-17
Attending: FAMILY MEDICINE
Payer: MEDICAID

## 2019-11-17 VITALS
SYSTOLIC BLOOD PRESSURE: 114 MMHG | DIASTOLIC BLOOD PRESSURE: 73 MMHG | HEIGHT: 63 IN | HEART RATE: 92 BPM | BODY MASS INDEX: 26.58 KG/M2 | OXYGEN SATURATION: 99 % | WEIGHT: 150 LBS | RESPIRATION RATE: 20 BRPM | TEMPERATURE: 98 F

## 2019-11-17 DIAGNOSIS — J45.21 MILD INTERMITTENT ASTHMA WITH EXACERBATION: Primary | ICD-10-CM

## 2019-11-17 DIAGNOSIS — J06.9 UPPER RESPIRATORY TRACT INFECTION, UNSPECIFIED TYPE: ICD-10-CM

## 2019-11-17 DIAGNOSIS — Z20.89 EXPOSURE TO PNEUMONIA: ICD-10-CM

## 2019-11-17 PROCEDURE — 71046 X-RAY EXAM CHEST 2 VIEWS: CPT | Performed by: FAMILY MEDICINE

## 2019-11-17 PROCEDURE — 99214 OFFICE O/P EST MOD 30 MIN: CPT

## 2019-11-17 PROCEDURE — 94640 AIRWAY INHALATION TREATMENT: CPT

## 2019-11-17 RX ORDER — DEXAMETHASONE SODIUM PHOSPHATE 4 MG/ML
16 INJECTION, SOLUTION INTRA-ARTICULAR; INTRALESIONAL; INTRAMUSCULAR; INTRAVENOUS; SOFT TISSUE ONCE
Status: COMPLETED | OUTPATIENT
Start: 2019-11-17 | End: 2019-11-17

## 2019-11-17 RX ORDER — PREDNISONE 20 MG/1
TABLET ORAL
Qty: 6 TABLET | Refills: 0 | Status: SHIPPED | OUTPATIENT
Start: 2019-11-17 | End: 2020-06-02

## 2019-11-17 RX ORDER — IPRATROPIUM BROMIDE AND ALBUTEROL SULFATE 2.5; .5 MG/3ML; MG/3ML
3 SOLUTION RESPIRATORY (INHALATION) ONCE
Status: COMPLETED | OUTPATIENT
Start: 2019-11-17 | End: 2019-11-17

## 2019-11-17 RX ORDER — ALBUTEROL SULFATE 90 UG/1
2 AEROSOL, METERED RESPIRATORY (INHALATION) EVERY 4 HOURS PRN
Qty: 1 INHALER | Refills: 0 | Status: SHIPPED | OUTPATIENT
Start: 2019-11-17 | End: 2020-06-02

## 2019-11-17 RX ORDER — AZITHROMYCIN 250 MG/1
TABLET, FILM COATED ORAL
Qty: 1 PACKAGE | Refills: 0 | Status: SHIPPED | OUTPATIENT
Start: 2019-11-17 | End: 2020-06-02

## 2019-11-17 NOTE — ED PROVIDER NOTES
Patient Seen in: THE [facility]      History   Patient presents with:  Cough/URI    Stated Complaint: CONGESTED X DAYS     HPI    This 20-year-old female with a known history for asthma presents to the office with complaint of sinus LMP 09/30/2019   SpO2 98%   BMI 26.57 kg/m²         Physical Exam    General: WH/WN/WD, in no respiratory distress, A and O times 3  HEAD: Normocephalic, atraumatic  EYES: Sclera anicteric,  conjunctiva normal.  EARS: Tympanic membranes normal, EAC's lamont chest tightness, shortness of breath or wheezing, 3 additional days of prednisone 20 mg tablets 2 tablets once daily with breakfast to start on 11/19/2019, Z-German 1 tablet at dinner for 5 days. Usage and side effects are reviewed.   The patient is instructe early in the day. Do not take any Ibuprofen, Advil, Motrin, Naproxen, Aspirin while on Prednisone. Tylenol is OK for fever or pain. Use the albuterol inhaler 2 puffs every 4 hours as needed for any chest tightness, shortness of breath or wheezing.   Carry

## 2019-11-17 NOTE — ED INITIAL ASSESSMENT (HPI)
Pt c/o cough, headache, and body aches x 4 days. Unsure of temp. States hot flashes/chills alternating. C/o SOB at night when lying flat. Daughter recently had pneumonia.

## 2020-02-16 ENCOUNTER — HOSPITAL ENCOUNTER (OUTPATIENT)
Age: 30
Discharge: HOME | End: 2020-02-16
Payer: MEDICAID

## 2020-02-16 VITALS
HEART RATE: 82 BPM | OXYGEN SATURATION: 99 % | WEIGHT: 160 LBS | TEMPERATURE: 98 F | DIASTOLIC BLOOD PRESSURE: 66 MMHG | BODY MASS INDEX: 28.35 KG/M2 | SYSTOLIC BLOOD PRESSURE: 107 MMHG | RESPIRATION RATE: 16 BRPM | HEIGHT: 63 IN

## 2020-02-16 DIAGNOSIS — M79.2 RADICULAR PAIN OF RIGHT UPPER EXTREMITY: Primary | ICD-10-CM

## 2020-02-16 PROCEDURE — 99214 OFFICE O/P EST MOD 30 MIN: CPT

## 2020-02-16 PROCEDURE — 99213 OFFICE O/P EST LOW 20 MIN: CPT

## 2020-02-16 RX ORDER — DEXAMETHASONE 4 MG/1
12 TABLET ORAL ONCE
Status: COMPLETED | OUTPATIENT
Start: 2020-02-16 | End: 2020-02-16

## 2020-02-16 RX ORDER — METHYLPREDNISOLONE 4 MG/1
TABLET ORAL
Qty: 1 PACKAGE | Refills: 0 | Status: SHIPPED | OUTPATIENT
Start: 2020-02-16 | End: 2020-06-02

## 2020-02-16 NOTE — ED INITIAL ASSESSMENT (HPI)
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

## 2020-04-23 ENCOUNTER — NURSE TRIAGE (OUTPATIENT)
Dept: FAMILY MEDICINE CLINIC | Facility: CLINIC | Age: 30
End: 2020-04-23

## 2020-04-23 DIAGNOSIS — Z3A.01 LESS THAN 8 WEEKS GESTATION OF PREGNANCY: Primary | ICD-10-CM

## 2020-04-23 DIAGNOSIS — J06.9 VIRAL UPPER RESPIRATORY INFECTION: ICD-10-CM

## 2020-04-23 PROCEDURE — 99213 OFFICE O/P EST LOW 20 MIN: CPT | Performed by: PHYSICIAN ASSISTANT

## 2020-04-23 RX ORDER — PNV NO.95/FERROUS FUM/FOLIC AC 28MG-0.8MG
1 TABLET ORAL DAILY
Qty: 90 TABLET | Refills: 3 | Status: SHIPPED | OUTPATIENT
Start: 2020-04-23 | End: 2020-05-23

## 2020-04-23 NOTE — TELEPHONE ENCOUNTER
Action Requested: Summary for Provider     []  Critical Lab, Recommendations Needed  [] Need Additional Advice  []   FYI    []   Need Orders  [] Need Medications Sent to Pharmacy  []  Other     SUMMARY: Pt stated x 3 days feels like it's hard to breathe, n

## 2020-04-23 NOTE — TELEPHONE ENCOUNTER
Virtual Telephone Check-In    Judi Rodriguez verbally consents to a Virtual/Telephone Check-In visit on 04/23/20. Patient understands and accepts financial responsibility for any deductible, co-insurance and/or co-pays associated with this service. methylPREDNISolone 4 MG Oral Tablet Therapy Pack Take as directed on package 1 Package 0   • Albuterol Sulfate  (90 Base) MCG/ACT Inhalation Aero Soln Inhale 2 puffs into the lungs every 4 (four) hours as needed for Wheezing or Shortness of Breath. decision making.   Appropriate medical decision-making and tests are ordered as detailed in the plan of care above.”            Hemanth Corbin PA-C

## 2020-05-05 ENCOUNTER — HOSPITAL ENCOUNTER (EMERGENCY)
Facility: HOSPITAL | Age: 30
Discharge: HOME | End: 2020-05-05
Attending: EMERGENCY MEDICINE
Payer: MEDICAID

## 2020-05-05 ENCOUNTER — APPOINTMENT (OUTPATIENT)
Dept: ULTRASOUND IMAGING | Facility: HOSPITAL | Age: 30
End: 2020-05-05
Attending: EMERGENCY MEDICINE
Payer: MEDICAID

## 2020-05-05 ENCOUNTER — TELEPHONE (OUTPATIENT)
Dept: OBGYN CLINIC | Facility: CLINIC | Age: 30
End: 2020-05-05

## 2020-05-05 VITALS
HEART RATE: 68 BPM | TEMPERATURE: 99 F | BODY MASS INDEX: 28.35 KG/M2 | SYSTOLIC BLOOD PRESSURE: 121 MMHG | HEIGHT: 63 IN | WEIGHT: 160 LBS | RESPIRATION RATE: 18 BRPM | DIASTOLIC BLOOD PRESSURE: 62 MMHG | OXYGEN SATURATION: 98 %

## 2020-05-05 DIAGNOSIS — N30.90 CYSTITIS: Primary | ICD-10-CM

## 2020-05-05 PROCEDURE — 87086 URINE CULTURE/COLONY COUNT: CPT | Performed by: EMERGENCY MEDICINE

## 2020-05-05 PROCEDURE — 84702 CHORIONIC GONADOTROPIN TEST: CPT | Performed by: EMERGENCY MEDICINE

## 2020-05-05 PROCEDURE — 76801 OB US < 14 WKS SINGLE FETUS: CPT | Performed by: EMERGENCY MEDICINE

## 2020-05-05 PROCEDURE — 81001 URINALYSIS AUTO W/SCOPE: CPT | Performed by: EMERGENCY MEDICINE

## 2020-05-05 PROCEDURE — 99284 EMERGENCY DEPT VISIT MOD MDM: CPT

## 2020-05-05 PROCEDURE — 85025 COMPLETE CBC W/AUTO DIFF WBC: CPT | Performed by: EMERGENCY MEDICINE

## 2020-05-05 PROCEDURE — 80048 BASIC METABOLIC PNL TOTAL CA: CPT | Performed by: EMERGENCY MEDICINE

## 2020-05-05 PROCEDURE — 36415 COLL VENOUS BLD VENIPUNCTURE: CPT

## 2020-05-05 RX ORDER — NITROFURANTOIN 25; 75 MG/1; MG/1
100 CAPSULE ORAL 2 TIMES DAILY
Qty: 14 CAPSULE | Refills: 0 | Status: SHIPPED | OUTPATIENT
Start: 2020-05-05 | End: 2020-05-12

## 2020-05-05 NOTE — TELEPHONE ENCOUNTER
Per pt newly pregnant, scheduled for missed menses with MLM on 5/7  LMP 02/29/20 Aprox 9w3d  Stated since yesterday having migraine like HA 7/10 and right lower abdominal pains aggravated by movement. Described as sharp/ shooting.   increased nausea and vom

## 2020-05-05 NOTE — TELEPHONE ENCOUNTER
I recommend that patient be evaluated in the ER for severe Migraine headache and for right lower quadrant abdominal pain to R/O appendicitis. Call patient.

## 2020-05-05 NOTE — TELEPHONE ENCOUNTER
PER PATIENT STATE SHE'S 9 WEEKS / PT STATE SHE'S HAS A HEADACHE / Darryl Oh / ABD PAIN / PLEASE ADVISE

## 2020-05-06 NOTE — ED INITIAL ASSESSMENT (HPI)
Patient presents to ER with c/o headache. Patient states she also has right sided pelvic pain. 9 weeks pregnant. Denies vaginal bleeding.

## 2020-05-06 NOTE — ED PROVIDER NOTES
Patient Seen in: [facility] AND [facility] Emergency Department      History   Patient presents with:  Headache  Pregnancy Issues    Stated Complaint: headache, pain to right pelvic area, 9 week pregnant     HPI    Patient is a 60-year-old female who states she (Oral)   Resp 18   Ht 160 cm (5' 3\")   Wt 72.6 kg   LMP 02/04/2020   SpO2 98%   BMI 28.34 kg/m²         Physical Exam    Constitutional: Oriented to person, place, and time. Appears well-developed and well-nourished.    HEENT:   Head: Normocephalic and atr Absolute 8.03 (*)     All other components within normal limits   BASIC METABOLIC PANEL (8) - Normal   RAPID SARS-COV-2 BY PCR - Normal   CBC WITH DIFFERENTIAL WITH PLATELET    Narrative:      The following orders were created for panel order CBC WITH DIFFE

## 2020-05-07 ENCOUNTER — TELEMEDICINE (OUTPATIENT)
Dept: OBGYN CLINIC | Facility: CLINIC | Age: 30
End: 2020-05-07

## 2020-05-07 DIAGNOSIS — N92.6 MISSED MENSES: Primary | ICD-10-CM

## 2020-05-07 PROCEDURE — 99213 OFFICE O/P EST LOW 20 MIN: CPT | Performed by: OBSTETRICS & GYNECOLOGY

## 2020-05-07 NOTE — PROGRESS NOTES
HPI:    Patient ID: Gloria Roblero is a 30year old female. [facility], [facility]  Obstetrics and Gynecology  Video Visit  Focused Gynecology Problem Exam  Roly LAWTON Socorro Phillip is a 30year old female presenting for Missed menses.     HPI:  Pa 1: Not recorded    Date: Not recorded     GA: Not recorded     Delivery: Not recorded    Marbella Sree: Not recorded     Thai Lias: Not recorded    Living: Not recorded    Name of Baby 2: Not recorded    Date: 04/14/11         GA: 39w0d            Delivery: Normal s Fear of current or ex partner: Not on file        Emotionally abused: Not on file        Physically abused: Not on file        Forced sexual activity: Not on file    Other Topics      Concerns:         Service: Not Asked        Blood Transfusi capsule (100 mg total) by mouth 2 (two) times daily for 7 days. 14 capsule 0   • Prenatal Vit-Fe Fumarate-FA (PRENATAL VITAMINS) 28-0.8 MG Oral Tab Take 1 tablet by mouth daily.  90 tablet 3   • methylPREDNISolone 4 MG Oral Tablet Therapy Pack Take as direc

## 2020-05-14 ENCOUNTER — TELEPHONE (OUTPATIENT)
Dept: OBGYN CLINIC | Facility: CLINIC | Age: 30
End: 2020-05-14

## 2020-05-14 ENCOUNTER — NURSE ONLY (OUTPATIENT)
Dept: OBGYN CLINIC | Facility: CLINIC | Age: 30
End: 2020-05-14
Payer: MEDICAID

## 2020-05-14 DIAGNOSIS — Z34.81 ENCOUNTER FOR SUPERVISION OF OTHER NORMAL PREGNANCY IN FIRST TRIMESTER: Primary | ICD-10-CM

## 2020-05-14 NOTE — PROGRESS NOTES
Pt Name and  verified. 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     Missed menses apt with: MLM 2020   LMP: 2020    Pre  BMI:  28.35  EPDS score:    +UPT at home: April    +UPT in office: in the ED on 2020     US: 2020   Wo

## 2020-05-29 ENCOUNTER — LAB ENCOUNTER (OUTPATIENT)
Dept: LAB | Facility: HOSPITAL | Age: 30
End: 2020-05-29
Attending: OBSTETRICS & GYNECOLOGY
Payer: MEDICAID

## 2020-05-29 DIAGNOSIS — Z34.81 ENCOUNTER FOR SUPERVISION OF OTHER NORMAL PREGNANCY IN FIRST TRIMESTER: ICD-10-CM

## 2020-05-29 PROCEDURE — 86780 TREPONEMA PALLIDUM: CPT

## 2020-05-29 PROCEDURE — 87086 URINE CULTURE/COLONY COUNT: CPT

## 2020-05-29 PROCEDURE — 86900 BLOOD TYPING SEROLOGIC ABO: CPT

## 2020-05-29 PROCEDURE — 81001 URINALYSIS AUTO W/SCOPE: CPT

## 2020-05-29 PROCEDURE — 87340 HEPATITIS B SURFACE AG IA: CPT

## 2020-05-29 PROCEDURE — 36415 COLL VENOUS BLD VENIPUNCTURE: CPT

## 2020-05-29 PROCEDURE — 86901 BLOOD TYPING SEROLOGIC RH(D): CPT

## 2020-05-29 PROCEDURE — 86762 RUBELLA ANTIBODY: CPT

## 2020-05-29 PROCEDURE — 87389 HIV-1 AG W/HIV-1&-2 AB AG IA: CPT

## 2020-05-29 PROCEDURE — 86850 RBC ANTIBODY SCREEN: CPT

## 2020-05-29 PROCEDURE — 85025 COMPLETE CBC W/AUTO DIFF WBC: CPT

## 2020-06-02 ENCOUNTER — HOSPITAL ENCOUNTER (OUTPATIENT)
Dept: ULTRASOUND IMAGING | Facility: HOSPITAL | Age: 30
Discharge: HOME | End: 2020-06-02
Attending: OBSTETRICS & GYNECOLOGY
Payer: MEDICAID

## 2020-06-02 ENCOUNTER — INITIAL PRENATAL (OUTPATIENT)
Dept: OBGYN CLINIC | Facility: CLINIC | Age: 30
End: 2020-06-02
Payer: MEDICAID

## 2020-06-02 ENCOUNTER — TELEPHONE (OUTPATIENT)
Dept: OBGYN CLINIC | Facility: CLINIC | Age: 30
End: 2020-06-02

## 2020-06-02 VITALS — SYSTOLIC BLOOD PRESSURE: 112 MMHG | BODY MASS INDEX: 30 KG/M2 | WEIGHT: 168.38 LBS | DIASTOLIC BLOOD PRESSURE: 68 MMHG

## 2020-06-02 DIAGNOSIS — Z34.81 ENCOUNTER FOR SUPERVISION OF OTHER NORMAL PREGNANCY IN FIRST TRIMESTER: Primary | ICD-10-CM

## 2020-06-02 DIAGNOSIS — Z34.81 ENCOUNTER FOR SUPERVISION OF OTHER NORMAL PREGNANCY IN FIRST TRIMESTER: ICD-10-CM

## 2020-06-02 PROCEDURE — 76815 OB US LIMITED FETUS(S): CPT | Performed by: OBSTETRICS & GYNECOLOGY

## 2020-06-02 PROCEDURE — 0500F INITIAL PRENATAL CARE VISIT: CPT | Performed by: OBSTETRICS & GYNECOLOGY

## 2020-06-02 PROCEDURE — 81002 URINALYSIS NONAUTO W/O SCOPE: CPT | Performed by: OBSTETRICS & GYNECOLOGY

## 2020-06-02 NOTE — PROGRESS NOTES
Pap Done. GC/Chlamydia Culture Done. To use Monostat-7 for Candida. For Hold and Call U/S today. R/O Missed Ab. Patient thinking about Elective Sterilization. Optionas reviewed including Vasectomy for partner.   Patient to think about options and will

## 2020-06-03 NOTE — TELEPHONE ENCOUNTER
Primary Diagnosis Code: Z34.81 Description: Encounter for supervision of other normal pregnancy, first trimester  Secondary Diagnosis Code:  Description:   Date of Service: 6/3/2020    CPT Code: 84547 OBUS Description: OB Ultrasound  Case Number: [id number]

## 2020-07-09 ENCOUNTER — TELEPHONE (OUTPATIENT)
Dept: OBGYN CLINIC | Facility: CLINIC | Age: 30
End: 2020-07-09

## 2020-07-09 ENCOUNTER — ROUTINE PRENATAL (OUTPATIENT)
Dept: OBGYN CLINIC | Facility: CLINIC | Age: 30
End: 2020-07-09
Payer: MEDICAID

## 2020-07-09 VITALS — DIASTOLIC BLOOD PRESSURE: 65 MMHG | BODY MASS INDEX: 30 KG/M2 | WEIGHT: 172 LBS | SYSTOLIC BLOOD PRESSURE: 110 MMHG

## 2020-07-09 DIAGNOSIS — N64.4 BREAST PAIN, RIGHT: ICD-10-CM

## 2020-07-09 DIAGNOSIS — Z34.82 ENCOUNTER FOR SUPERVISION OF OTHER NORMAL PREGNANCY IN SECOND TRIMESTER: Primary | ICD-10-CM

## 2020-07-09 DIAGNOSIS — N63.0 LUMP OR MASS IN BREAST: ICD-10-CM

## 2020-07-09 PROBLEM — N89.8 VAGINAL DISCHARGE: Status: RESOLVED | Noted: 2019-06-13 | Resolved: 2020-07-09

## 2020-07-09 PROBLEM — D25.2 SUBSEROUS LEIOMYOMA OF UTERUS: Status: ACTIVE | Noted: 2020-07-09

## 2020-07-09 PROBLEM — Z00.00 ROUTINE PHYSICAL EXAMINATION: Status: RESOLVED | Noted: 2018-05-25 | Resolved: 2020-07-09

## 2020-07-09 PROBLEM — Z30.013 ENCOUNTER FOR INITIAL PRESCRIPTION OF INJECTABLE CONTRACEPTIVE: Status: RESOLVED | Noted: 2019-08-13 | Resolved: 2020-07-09

## 2020-07-09 LAB
KETONES (URINE DIPSTICK): 15 MG/DL
MULTISTIX LOT#: 1044 NUMERIC
PH, URINE: 5.5 (ref 4.5–8)
PROTEIN (URINE DIPSTICK): 30 MG/DL
SPECIFIC GRAVITY: 1.03 (ref 1–1.03)
URINE-COLOR: YELLOW
UROBILINOGEN,SEMI-QN: 1 MG/DL (ref 0–1.9)

## 2020-07-09 PROCEDURE — 81002 URINALYSIS NONAUTO W/O SCOPE: CPT | Performed by: OBSTETRICS & GYNECOLOGY

## 2020-07-09 PROCEDURE — 0502F SUBSEQUENT PRENATAL CARE: CPT | Performed by: OBSTETRICS & GYNECOLOGY

## 2020-07-09 NOTE — TELEPHONE ENCOUNTER
Primary Diagnosis Code: Z34.82 Description: Encounter for supervision of other normal pregnancy, second trimester  Secondary Diagnosis Code:  Description:   Date of Service: Not provided    CPT Code: 70703 OBUS Description: OB Ultrasound  Authorization Num

## 2020-07-10 PROBLEM — N64.4 BREAST PAIN, RIGHT: Status: ACTIVE | Noted: 2020-07-10

## 2020-07-10 PROBLEM — Z34.90 SUPERVISION OF NORMAL PREGNANCY: Status: ACTIVE | Noted: 2020-07-10

## 2020-07-10 PROBLEM — N63.0 LUMP OR MASS IN BREAST: Status: ACTIVE | Noted: 2020-07-10

## 2020-07-10 PROBLEM — Z34.90 SUPERVISION OF NORMAL PREGNANCY (HCC): Status: ACTIVE | Noted: 2020-07-10

## 2020-07-10 NOTE — PROGRESS NOTES
No complaints. Good fetal movements. Declines genetic screening. Desires tubal sterilization after delivery  20 wk ob u/s  C/o right breast pain on and off. Thinks she felt a lump but not today, comes and goes. Would like a breast ultrasound.

## 2020-07-19 NOTE — PROGRESS NOTES
/71   Pulse 91   Wt 172 lb (78 kg)   LMP 02/29/2020 (Exact Date)   BMI 30.47 kg/m²      STANDARD OBSTETRIC ULTRASOUND REPORT   See imaging tab for complete consultation / ultrasound report      Fetal Heart Rate: Present 141 bpm  Fetal Presentation: c

## 2020-07-21 ENCOUNTER — HOSPITAL ENCOUNTER (OUTPATIENT)
Dept: PERINATAL CARE | Facility: HOSPITAL | Age: 30
Discharge: HOME | End: 2020-07-21
Attending: OBSTETRICS & GYNECOLOGY
Payer: MEDICAID

## 2020-07-21 VITALS
HEART RATE: 91 BPM | WEIGHT: 172 LBS | BODY MASS INDEX: 30 KG/M2 | DIASTOLIC BLOOD PRESSURE: 71 MMHG | SYSTOLIC BLOOD PRESSURE: 114 MMHG

## 2020-07-21 DIAGNOSIS — Z36.3 ENCOUNTER FOR ANTENATAL SCREENING FOR MALFORMATION USING ULTRASOUND: Primary | ICD-10-CM

## 2020-07-21 DIAGNOSIS — Z36.3 ENCOUNTER FOR ANTENATAL SCREENING FOR MALFORMATION USING ULTRASOUND: ICD-10-CM

## 2020-07-21 PROCEDURE — 76805 OB US >/= 14 WKS SNGL FETUS: CPT | Performed by: OBSTETRICS & GYNECOLOGY

## 2020-07-29 ENCOUNTER — HOSPITAL ENCOUNTER (OUTPATIENT)
Dept: ULTRASOUND IMAGING | Facility: HOSPITAL | Age: 30
Discharge: HOME | End: 2020-07-29
Attending: OBSTETRICS & GYNECOLOGY
Payer: MEDICAID

## 2020-07-29 DIAGNOSIS — N63.0 LUMP OR MASS IN BREAST: ICD-10-CM

## 2020-07-29 DIAGNOSIS — N64.4 BREAST PAIN, RIGHT: ICD-10-CM

## 2020-07-29 DIAGNOSIS — N63.0 BREAST LUMP OR MASS: ICD-10-CM

## 2020-07-29 PROCEDURE — 76642 ULTRASOUND BREAST LIMITED: CPT | Performed by: OBSTETRICS & GYNECOLOGY

## 2020-07-31 ENCOUNTER — TELEPHONE (OUTPATIENT)
Dept: OBGYN CLINIC | Facility: CLINIC | Age: 30
End: 2020-07-31

## 2020-07-31 NOTE — TELEPHONE ENCOUNTER
Pt called and informed of results and recommendations.  Pt voices understanding.      ----- Message from Becka Menchaca MD sent at 7/31/2020  1:28 PM CDT -----  Please inform patient that her breast ultrasound did not reveal any breast mass or cyst.  She

## 2020-08-02 ENCOUNTER — HOSPITAL ENCOUNTER (OUTPATIENT)
Facility: HOSPITAL | Age: 30
Setting detail: OBSERVATION
Discharge: HOME | End: 2020-08-02
Attending: OBSTETRICS & GYNECOLOGY | Admitting: OBSTETRICS & GYNECOLOGY
Payer: MEDICAID

## 2020-08-02 VITALS
HEART RATE: 71 BPM | TEMPERATURE: 97 F | SYSTOLIC BLOOD PRESSURE: 119 MMHG | RESPIRATION RATE: 16 BRPM | DIASTOLIC BLOOD PRESSURE: 70 MMHG

## 2020-08-02 PROBLEM — Z34.90 PREGNANCY: Status: ACTIVE | Noted: 2020-08-02

## 2020-08-02 PROBLEM — Z34.90 PREGNANCY (HCC): Status: ACTIVE | Noted: 2020-08-02

## 2020-08-02 PROCEDURE — 99213 OFFICE O/P EST LOW 20 MIN: CPT

## 2020-08-02 RX ORDER — PRENATAL VIT/IRON FUM/FOLIC AC 27MG-0.8MG
1 TABLET ORAL DAILY
Status: ON HOLD | COMMUNITY
End: 2020-12-03

## 2020-08-02 NOTE — PROGRESS NOTES
Discharge Instruction given, pt going home in stable condition, accompanied with sister in stable condition, all pt belongings sent with pt on discharge. Pt not in active labor on discharge.

## 2020-08-02 NOTE — PROGRESS NOTES
Pt is a 30year old female admitted to TRG3/TRG3-A. Patient presents with:  Vaginal Bleeding     Pt is  22w1d intra-uterine pregnancy. History obtained, consents signed. Oriented to room, staff, and plan of care.   Pt say she went up to the [facility]

## 2020-08-06 ENCOUNTER — ROUTINE PRENATAL (OUTPATIENT)
Dept: OBGYN CLINIC | Facility: CLINIC | Age: 30
End: 2020-08-06
Payer: MEDICAID

## 2020-08-06 VITALS — DIASTOLIC BLOOD PRESSURE: 70 MMHG | SYSTOLIC BLOOD PRESSURE: 114 MMHG | BODY MASS INDEX: 31 KG/M2 | WEIGHT: 174 LBS

## 2020-08-06 DIAGNOSIS — N63.0 LUMP OR MASS IN BREAST: ICD-10-CM

## 2020-08-06 DIAGNOSIS — Z34.92 NORMAL PREGNANCY IN SECOND TRIMESTER: ICD-10-CM

## 2020-08-06 DIAGNOSIS — B37.3 YEAST INFECTION OF THE VAGINA: Primary | ICD-10-CM

## 2020-08-06 LAB
APPEARANCE: CLEAR
MULTISTIX LOT#: NORMAL NUMERIC
PH, URINE: 6.5 (ref 4.5–8)
SPECIFIC GRAVITY: 1.02 (ref 1–1.03)
URINE-COLOR: YELLOW

## 2020-08-06 PROCEDURE — 1111F DSCHRG MED/CURRENT MED MERGE: CPT | Performed by: OBSTETRICS & GYNECOLOGY

## 2020-08-06 PROCEDURE — 3074F SYST BP LT 130 MM HG: CPT | Performed by: OBSTETRICS & GYNECOLOGY

## 2020-08-06 PROCEDURE — 81002 URINALYSIS NONAUTO W/O SCOPE: CPT | Performed by: OBSTETRICS & GYNECOLOGY

## 2020-08-06 PROCEDURE — 3078F DIAST BP <80 MM HG: CPT | Performed by: OBSTETRICS & GYNECOLOGY

## 2020-08-06 PROCEDURE — 99213 OFFICE O/P EST LOW 20 MIN: CPT | Performed by: OBSTETRICS & GYNECOLOGY

## 2020-08-06 NOTE — PROGRESS NOTES
Problem visit:  Pt with hx of possible right breast mass/lump, had an u/s at [facility] and normal right breast u/s noted.  Pt also noted hx of yeast infection, was tx with monistat 7, and has some residual itching, pt counseled and will try hydrocortisone 1 perce

## 2020-09-02 ENCOUNTER — ROUTINE PRENATAL (OUTPATIENT)
Dept: OBGYN CLINIC | Facility: CLINIC | Age: 30
End: 2020-09-02
Payer: MEDICAID

## 2020-09-02 VITALS — BODY MASS INDEX: 32 KG/M2 | DIASTOLIC BLOOD PRESSURE: 70 MMHG | WEIGHT: 180.81 LBS | SYSTOLIC BLOOD PRESSURE: 112 MMHG

## 2020-09-02 DIAGNOSIS — N76.0 BV (BACTERIAL VAGINOSIS): ICD-10-CM

## 2020-09-02 DIAGNOSIS — B96.89 BV (BACTERIAL VAGINOSIS): ICD-10-CM

## 2020-09-02 DIAGNOSIS — Z11.3 SCREEN FOR STD (SEXUALLY TRANSMITTED DISEASE): ICD-10-CM

## 2020-09-02 DIAGNOSIS — Z34.82 ENCOUNTER FOR SUPERVISION OF OTHER NORMAL PREGNANCY IN SECOND TRIMESTER: Primary | ICD-10-CM

## 2020-09-02 LAB
MULTISTIX LOT#: 1044 NUMERIC
PH, URINE: 5 (ref 4.5–8)
SPECIFIC GRAVITY: 1.02 (ref 1–1.03)
URINE-COLOR: YELLOW
UROBILINOGEN,SEMI-QN: 0.2 MG/DL (ref 0–1.9)

## 2020-09-02 PROCEDURE — 0502F SUBSEQUENT PRENATAL CARE: CPT | Performed by: OBSTETRICS & GYNECOLOGY

## 2020-09-02 PROCEDURE — 81002 URINALYSIS NONAUTO W/O SCOPE: CPT | Performed by: OBSTETRICS & GYNECOLOGY

## 2020-09-02 PROCEDURE — 3078F DIAST BP <80 MM HG: CPT | Performed by: OBSTETRICS & GYNECOLOGY

## 2020-09-02 PROCEDURE — 3074F SYST BP LT 130 MM HG: CPT | Performed by: OBSTETRICS & GYNECOLOGY

## 2020-09-02 NOTE — PROGRESS NOTES
To do labs x 2 weeks. Patient states yeast infection did not resolve after using Monostat-7 and then did not resolve after Dr Adrienne Echeverria recommended [place]. Will send in [facility]. BV Panel Done. GC/Chlamydia Culture Done. F/U results.

## 2020-09-03 LAB
C TRACH DNA SPEC QL NAA+PROBE: NEGATIVE
N GONORRHOEA DNA SPEC QL NAA+PROBE: NEGATIVE

## 2020-09-05 LAB
GENITAL VAGINOSIS SCREEN: NEGATIVE
TRICHOMONAS SCREEN: NEGATIVE

## 2020-09-12 ENCOUNTER — LAB ENCOUNTER (OUTPATIENT)
Dept: LAB | Facility: HOSPITAL | Age: 30
End: 2020-09-12
Attending: OBSTETRICS & GYNECOLOGY
Payer: MEDICAID

## 2020-09-12 DIAGNOSIS — Z34.82 ENCOUNTER FOR SUPERVISION OF OTHER NORMAL PREGNANCY IN SECOND TRIMESTER: ICD-10-CM

## 2020-09-12 LAB
BASOPHILS # BLD AUTO: 0.02 X10(3) UL (ref 0–0.2)
BASOPHILS NFR BLD AUTO: 0.2 %
DEPRECATED RDW RBC AUTO: 42.5 FL (ref 35.1–46.3)
EOSINOPHIL # BLD AUTO: 0.05 X10(3) UL (ref 0–0.7)
EOSINOPHIL NFR BLD AUTO: 0.5 %
ERYTHROCYTE [DISTWIDTH] IN BLOOD BY AUTOMATED COUNT: 12.4 % (ref 11–15)
GLUCOSE 1H P GLC SERPL-MCNC: 136 MG/DL
HCT VFR BLD AUTO: 37 % (ref 35–48)
HGB BLD-MCNC: 11.4 G/DL (ref 12–16)
IMM GRANULOCYTES # BLD AUTO: 0.04 X10(3) UL (ref 0–1)
IMM GRANULOCYTES NFR BLD: 0.4 %
LYMPHOCYTES # BLD AUTO: 2.63 X10(3) UL (ref 1–4)
LYMPHOCYTES NFR BLD AUTO: 24.8 %
MCH RBC QN AUTO: 28.9 PG (ref 26–34)
MCHC RBC AUTO-ENTMCNC: 30.8 G/DL (ref 31–37)
MCV RBC AUTO: 93.7 FL (ref 80–100)
MONOCYTES # BLD AUTO: 0.54 X10(3) UL (ref 0.1–1)
MONOCYTES NFR BLD AUTO: 5.1 %
NEUTROPHILS # BLD AUTO: 7.32 X10 (3) UL (ref 1.5–7.7)
NEUTROPHILS # BLD AUTO: 7.32 X10(3) UL (ref 1.5–7.7)
NEUTROPHILS NFR BLD AUTO: 69 %
PLATELET # BLD AUTO: 200 10(3)UL (ref 150–450)
RBC # BLD AUTO: 3.95 X10(6)UL (ref 3.8–5.3)
WBC # BLD AUTO: 10.6 X10(3) UL (ref 4–11)

## 2020-09-12 PROCEDURE — 85025 COMPLETE CBC W/AUTO DIFF WBC: CPT

## 2020-09-12 PROCEDURE — 82950 GLUCOSE TEST: CPT

## 2020-09-12 PROCEDURE — 36415 COLL VENOUS BLD VENIPUNCTURE: CPT

## 2020-09-14 ENCOUNTER — TELEPHONE (OUTPATIENT)
Dept: OBGYN CLINIC | Facility: CLINIC | Age: 30
End: 2020-09-14

## 2020-09-14 DIAGNOSIS — R73.09 ELEVATED GLUCOSE TOLERANCE TEST: Primary | ICD-10-CM

## 2020-09-14 NOTE — TELEPHONE ENCOUNTER
----- Message from Jose Caicedo MD sent at 9/12/2020 12:53 PM CDT -----  Abnormal 1 hr GTT. Send for 3 hr GTT ASAP.

## 2020-09-14 NOTE — TELEPHONE ENCOUNTER
Pt Name and  verified. Patient informed and verbalized understanding. Pt states that she was recently seen and was prescribed a script for her vaginal irritation (Terazol).  States that she used it and has not noticed any improvement in her symptom

## 2020-09-16 ENCOUNTER — ROUTINE PRENATAL (OUTPATIENT)
Dept: OBGYN CLINIC | Facility: CLINIC | Age: 30
End: 2020-09-16
Payer: MEDICAID

## 2020-09-16 VITALS — DIASTOLIC BLOOD PRESSURE: 70 MMHG | SYSTOLIC BLOOD PRESSURE: 116 MMHG | BODY MASS INDEX: 32 KG/M2 | WEIGHT: 180.13 LBS

## 2020-09-16 DIAGNOSIS — Z34.82 ENCOUNTER FOR SUPERVISION OF OTHER NORMAL PREGNANCY IN SECOND TRIMESTER: Primary | ICD-10-CM

## 2020-09-16 LAB
MULTISTIX LOT#: 1044 NUMERIC
PH, URINE: 6.5 (ref 4.5–8)
SPECIFIC GRAVITY: 1.01 (ref 1–1.03)
URINE-COLOR: YELLOW
UROBILINOGEN,SEMI-QN: 0.2 MG/DL (ref 0–1.9)

## 2020-09-16 PROCEDURE — 90715 TDAP VACCINE 7 YRS/> IM: CPT | Performed by: NURSE PRACTITIONER

## 2020-09-16 PROCEDURE — 90471 IMMUNIZATION ADMIN: CPT | Performed by: NURSE PRACTITIONER

## 2020-09-16 PROCEDURE — 3078F DIAST BP <80 MM HG: CPT | Performed by: NURSE PRACTITIONER

## 2020-09-16 PROCEDURE — 3074F SYST BP LT 130 MM HG: CPT | Performed by: NURSE PRACTITIONER

## 2020-09-16 PROCEDURE — 81002 URINALYSIS NONAUTO W/O SCOPE: CPT | Performed by: NURSE PRACTITIONER

## 2020-09-16 PROCEDURE — 0502F SUBSEQUENT PRENATAL CARE: CPT | Performed by: NURSE PRACTITIONER

## 2020-09-16 RX ORDER — BREAST PUMP
EACH MISCELLANEOUS
Qty: 1 EACH | Refills: 0 | Status: SHIPPED | OUTPATIENT
Start: 2020-09-16

## 2020-09-16 NOTE — PROGRESS NOTES
[facility]  Obstetrics and Gynecology  28 Week Prenatal Visit  Jorje Woodward, MSN, APRN, FNP-BC      HPI   Theone Stanley is a 30year old. [id number] 28w4d weeks. BULMARO 12/5/20. Doing well. Still has vaginal discharge and itching.   Has used Monistat t for supervision of other normal pregnancy in second trimester  (primary encounter diagnosis)  Plan: URINALYSIS NONAUTO W/O SCOPE      PLAN     - Counseled on TDAP vaccine - to receive today. - 28 week labs done. Reviewed with patient.  Needs 3 hour GTT- understanding. To call if symptoms are not improving by next week Monday. - Pt to follow up with Dr. Oralia Sands  in 2 weeks.       Caden Pham, APRN  3:50 PM  9/16/2020

## 2020-09-19 ENCOUNTER — LABORATORY ENCOUNTER (OUTPATIENT)
Dept: LAB | Facility: HOSPITAL | Age: 30
End: 2020-09-19
Attending: OBSTETRICS & GYNECOLOGY
Payer: MEDICAID

## 2020-09-19 DIAGNOSIS — R73.09 ELEVATED GLUCOSE TOLERANCE TEST: ICD-10-CM

## 2020-09-19 LAB
1 HR GLUCOSE GESTATIONAL: 146 MG/DL
GLUCOSE 1H P GLC SERPL-MCNC: 130 MG/DL
GLUCOSE 3H P GLC SERPL-MCNC: 92 MG/DL
GLUCOSE BLD-MCNC: 95 MG/DL (ref 70–99)
GLUCOSE P FAST SERPL-MCNC: 93 MG/DL

## 2020-09-19 PROCEDURE — 36415 COLL VENOUS BLD VENIPUNCTURE: CPT

## 2020-09-19 PROCEDURE — 82962 GLUCOSE BLOOD TEST: CPT

## 2020-09-19 PROCEDURE — 82952 GTT-ADDED SAMPLES: CPT

## 2020-09-19 PROCEDURE — 82951 GLUCOSE TOLERANCE TEST (GTT): CPT

## 2020-09-24 ENCOUNTER — TELEPHONE (OUTPATIENT)
Dept: OBGYN CLINIC | Facility: CLINIC | Age: 30
End: 2020-09-24

## 2020-09-24 NOTE — TELEPHONE ENCOUNTER
Needs a letter for embassy to state when she started seeing the DR  And her delivery date . Would like it  to state father must be here for the delivery. Anjelica Clark

## 2020-10-03 ENCOUNTER — ROUTINE PRENATAL (OUTPATIENT)
Dept: OBGYN CLINIC | Facility: CLINIC | Age: 30
End: 2020-10-03
Payer: MEDICAID

## 2020-10-03 VITALS — WEIGHT: 179 LBS | DIASTOLIC BLOOD PRESSURE: 77 MMHG | SYSTOLIC BLOOD PRESSURE: 99 MMHG | BODY MASS INDEX: 32 KG/M2

## 2020-10-03 DIAGNOSIS — N89.8 VAGINAL DISCHARGE: ICD-10-CM

## 2020-10-03 DIAGNOSIS — B37.3 VAGINAL YEAST INFECTION: Primary | ICD-10-CM

## 2020-10-03 DIAGNOSIS — N94.9 FEMALE GENITAL SYMPTOMS: ICD-10-CM

## 2020-10-03 DIAGNOSIS — Z34.83 ENCOUNTER FOR SUPERVISION OF OTHER NORMAL PREGNANCY IN THIRD TRIMESTER: ICD-10-CM

## 2020-10-03 PROCEDURE — 87220 TISSUE EXAM FOR FUNGI: CPT | Performed by: OBSTETRICS & GYNECOLOGY

## 2020-10-03 PROCEDURE — 81002 URINALYSIS NONAUTO W/O SCOPE: CPT | Performed by: OBSTETRICS & GYNECOLOGY

## 2020-10-03 PROCEDURE — 99213 OFFICE O/P EST LOW 20 MIN: CPT | Performed by: OBSTETRICS & GYNECOLOGY

## 2020-10-03 PROCEDURE — 3078F DIAST BP <80 MM HG: CPT | Performed by: OBSTETRICS & GYNECOLOGY

## 2020-10-03 PROCEDURE — 3074F SYST BP LT 130 MM HG: CPT | Performed by: OBSTETRICS & GYNECOLOGY

## 2020-10-03 PROCEDURE — 87186 SC STD MICRODIL/AGAR DIL: CPT | Performed by: OBSTETRICS & GYNECOLOGY

## 2020-10-03 RX ORDER — FLUCONAZOLE 150 MG/1
150 TABLET ORAL ONCE
Qty: 1 TABLET | Refills: 0 | Status: SHIPPED | OUTPATIENT
Start: 2020-10-03 | End: 2020-10-03

## 2020-10-03 NOTE — PROGRESS NOTES
[facility], [facility]  Obstetrics and Gynecology  Prenatal Visit  Valdez Lorenzana MD    RADHA Roblero is a 35year old.o. [id number] 31w0d weeks. Here for routine prenatal visit and is without complaints.   Patient denies any regular uterine contractions, s discussed the option for Terazol 3 vaginal cream, she has already taken Terazol 7 without significant benefit. Patient opts for Diflucan.   I have sent a vaginosis panel which we will check for sensitivities to yeast.  Pt counseled on recommendation for fl

## 2020-10-07 ENCOUNTER — TELEPHONE (OUTPATIENT)
Dept: OBGYN CLINIC | Facility: CLINIC | Age: 30
End: 2020-10-07

## 2020-10-07 ENCOUNTER — MED REC SCAN ONLY (OUTPATIENT)
Dept: OBGYN CLINIC | Facility: CLINIC | Age: 30
End: 2020-10-07

## 2020-10-15 ENCOUNTER — TELEPHONE (OUTPATIENT)
Dept: OBGYN CLINIC | Facility: CLINIC | Age: 30
End: 2020-10-15

## 2020-10-15 DIAGNOSIS — B37.9 CANDIDA ALBICANS INFECTION: Primary | ICD-10-CM

## 2020-10-15 NOTE — TELEPHONE ENCOUNTER
Pt Name and  verified. Pt requesting a referral for Dr. Chantell Stiles. Referral placed for provider. Faxed to number provided.

## 2020-10-15 NOTE — TELEPHONE ENCOUNTER
Pt called and informed of results and recommendations. Pt voices understanding. Dr. Marilyn Rodriguez information sent to pt per [employer]. Pt placed in follow up book.       ----- Message from Lyric Griggs MD sent at 10/15/2020  2:39 PM CDT -----  Result noted

## 2020-10-20 ENCOUNTER — ROUTINE PRENATAL (OUTPATIENT)
Dept: OBGYN CLINIC | Facility: CLINIC | Age: 30
End: 2020-10-20
Payer: MEDICAID

## 2020-10-20 VITALS — WEIGHT: 185 LBS | SYSTOLIC BLOOD PRESSURE: 120 MMHG | DIASTOLIC BLOOD PRESSURE: 68 MMHG | BODY MASS INDEX: 33 KG/M2

## 2020-10-20 DIAGNOSIS — Z34.83 ENCOUNTER FOR SUPERVISION OF OTHER NORMAL PREGNANCY IN THIRD TRIMESTER: Primary | ICD-10-CM

## 2020-10-20 PROCEDURE — 81002 URINALYSIS NONAUTO W/O SCOPE: CPT | Performed by: OBSTETRICS & GYNECOLOGY

## 2020-10-20 PROCEDURE — 3078F DIAST BP <80 MM HG: CPT | Performed by: OBSTETRICS & GYNECOLOGY

## 2020-10-20 PROCEDURE — 3074F SYST BP LT 130 MM HG: CPT | Performed by: OBSTETRICS & GYNECOLOGY

## 2020-10-20 PROCEDURE — 0502F SUBSEQUENT PRENATAL CARE: CPT | Performed by: OBSTETRICS & GYNECOLOGY

## 2020-10-31 ENCOUNTER — LAB ENCOUNTER (OUTPATIENT)
Dept: LAB | Facility: HOSPITAL | Age: 30
End: 2020-10-31
Attending: OBSTETRICS & GYNECOLOGY
Payer: MEDICAID

## 2020-10-31 DIAGNOSIS — Z34.83 ENCOUNTER FOR SUPERVISION OF OTHER NORMAL PREGNANCY IN THIRD TRIMESTER: ICD-10-CM

## 2020-10-31 PROCEDURE — 36415 COLL VENOUS BLD VENIPUNCTURE: CPT

## 2020-10-31 PROCEDURE — 87389 HIV-1 AG W/HIV-1&-2 AB AG IA: CPT

## 2020-10-31 PROCEDURE — 86780 TREPONEMA PALLIDUM: CPT

## 2020-10-31 PROCEDURE — 85025 COMPLETE CBC W/AUTO DIFF WBC: CPT

## 2020-11-03 ENCOUNTER — TELEPHONE (OUTPATIENT)
Dept: OBGYN CLINIC | Facility: CLINIC | Age: 30
End: 2020-11-03

## 2020-11-03 DIAGNOSIS — Z20.822 CLOSE EXPOSURE TO COVID-19 VIRUS: Primary | ICD-10-CM

## 2020-11-03 NOTE — TELEPHONE ENCOUNTER
Order placed and pt informed. Pt advised to self quarantine until she is tested and has received test results. Pt had no other questions.

## 2020-11-03 NOTE — TELEPHONE ENCOUNTER
Pt Name and  verified. Pt states that she was exposed to brother in law who tested positive about two days ago. Pt does not have any other symptoms besides a stuffy nose. Denies fevers/chills. Wondering if she should be tested.   Pls advise

## 2020-11-05 ENCOUNTER — TELEMEDICINE (OUTPATIENT)
Dept: OBGYN CLINIC | Facility: CLINIC | Age: 30
End: 2020-11-05

## 2020-11-05 ENCOUNTER — APPOINTMENT (OUTPATIENT)
Dept: LAB | Age: 30
End: 2020-11-05
Attending: OBSTETRICS & GYNECOLOGY
Payer: MEDICAID

## 2020-11-05 DIAGNOSIS — Z20.822 CLOSE EXPOSURE TO COVID-19 VIRUS: ICD-10-CM

## 2020-11-05 DIAGNOSIS — Z34.80 SUPERVISION OF OTHER NORMAL PREGNANCY: Primary | ICD-10-CM

## 2020-11-05 PROCEDURE — 0502F SUBSEQUENT PRENATAL CARE: CPT | Performed by: OBSTETRICS & GYNECOLOGY

## 2020-11-05 NOTE — PROGRESS NOTES
[facility], [facility]  Obstetrics and Gynecology  Video Visit  Focused Ob Problem Exam  Lakshmi Davis is a 35year old female presenting PN appt. Good fetal movements. Sister and sister in law Positive COVID.         HPI:   Reports good fe Maternal Grandmother    • Genetic Disease Maternal Uncle         muscular dystrophy       Social History    Socioeconomic History      Marital status:       Spouse name: Not on file      Number of children: 2      Years of education: Not on file Diet: Not Asked        Back Care: Not Asked        Exercise: Not Asked        Bike Helmet: Not Asked        Seat Belt: Not Asked        Self-Exams: Not Asked        Grew up on a farm: Not Asked        History of tanning: Not Asked        Outdoor occupation

## 2020-11-10 ENCOUNTER — ROUTINE PRENATAL (OUTPATIENT)
Dept: OBGYN CLINIC | Facility: CLINIC | Age: 30
End: 2020-11-10
Payer: MEDICAID

## 2020-11-10 VITALS — DIASTOLIC BLOOD PRESSURE: 72 MMHG | SYSTOLIC BLOOD PRESSURE: 118 MMHG | WEIGHT: 181.81 LBS | BODY MASS INDEX: 32 KG/M2

## 2020-11-10 DIAGNOSIS — Z34.83 ENCOUNTER FOR SUPERVISION OF OTHER NORMAL PREGNANCY IN THIRD TRIMESTER: Primary | ICD-10-CM

## 2020-11-10 PROBLEM — B37.3 YEAST VAGINITIS: Status: ACTIVE | Noted: 2020-11-10

## 2020-11-10 PROBLEM — B37.31 YEAST VAGINITIS: Status: ACTIVE | Noted: 2020-11-10

## 2020-11-10 PROCEDURE — 0502F SUBSEQUENT PRENATAL CARE: CPT | Performed by: OBSTETRICS & GYNECOLOGY

## 2020-11-10 PROCEDURE — 3078F DIAST BP <80 MM HG: CPT | Performed by: OBSTETRICS & GYNECOLOGY

## 2020-11-10 PROCEDURE — 3074F SYST BP LT 130 MM HG: CPT | Performed by: OBSTETRICS & GYNECOLOGY

## 2020-11-10 PROCEDURE — 81002 URINALYSIS NONAUTO W/O SCOPE: CPT | Performed by: OBSTETRICS & GYNECOLOGY

## 2020-11-10 NOTE — PROGRESS NOTES
Reporting good fetal movements. No major complaints. Still having moderately heavy vaginal discharge. Has a resistant strong vaginal yeast infection. Saw infectious disease today, awaiting recommendations.  GBS done

## 2020-11-12 ENCOUNTER — TELEPHONE (OUTPATIENT)
Dept: OBGYN CLINIC | Facility: CLINIC | Age: 30
End: 2020-11-12

## 2020-11-12 NOTE — TELEPHONE ENCOUNTER
Received call from Susannah James from office of Dr. Kelvin Minor to inform labs would be faxed to our office for JF to review and would like to know if Jf would be treating pt or if he would like them to provide treatment. Labs in [address] [place] [address] office.   Susannah James informed m

## 2020-11-13 ENCOUNTER — TELEPHONE (OUTPATIENT)
Dept: OBGYN CLINIC | Facility: CLINIC | Age: 30
End: 2020-11-13

## 2020-11-13 NOTE — TELEPHONE ENCOUNTER
I spoke with patient on the phone. She is requesting a note to not go to work due to her concerns about COVID-19 and her statement that people at her place of employment have been positive. Her due date is 12/5/2020.   I counseled patient that we could no

## 2020-11-13 NOTE — TELEPHONE ENCOUNTER
Pt Name and  verified. Patient informed and verbalized understanding. Pt sent pt note through [employer] and pt aware. No further questions.

## 2020-11-13 NOTE — TELEPHONE ENCOUNTER
I spoke with Dr. Jonny Sullivan on the phone. She is arranging for the patient to receive amphotericin B topical cream to be used vaginally. This will be done at a compounding pharmacy.   She is also sent in antibiotics for treatment of UTI with group B beta str

## 2020-11-13 NOTE — TELEPHONE ENCOUNTER
Patient calling she forgot to ask for a work note to stop work since she is at 37 weeks. She needs it for HR by Monday.      Please advise

## 2020-11-15 ENCOUNTER — DOCUMENTATION ONLY (OUTPATIENT)
Dept: OBGYN CLINIC | Facility: CLINIC | Age: 30
End: 2020-11-15

## 2020-11-15 PROBLEM — O23.40 GROUP B STREPTOCOCCUS URINARY TRACT INFECTION AFFECTING PREGNANCY, ANTEPARTUM (HCC): Status: ACTIVE | Noted: 2020-11-15

## 2020-11-15 PROBLEM — O23.40 GROUP B STREPTOCOCCUS URINARY TRACT INFECTION AFFECTING PREGNANCY, ANTEPARTUM: Status: ACTIVE | Noted: 2020-11-15

## 2020-11-15 PROBLEM — B95.1 GROUP B STREPTOCOCCUS URINARY TRACT INFECTION AFFECTING PREGNANCY, ANTEPARTUM: Status: ACTIVE | Noted: 2020-11-15

## 2020-11-15 PROBLEM — B95.1 GROUP B STREPTOCOCCUS URINARY TRACT INFECTION AFFECTING PREGNANCY, ANTEPARTUM (HCC): Status: ACTIVE | Noted: 2020-11-15

## 2020-11-19 ENCOUNTER — ROUTINE PRENATAL (OUTPATIENT)
Dept: OBGYN CLINIC | Facility: CLINIC | Age: 30
End: 2020-11-19
Payer: MEDICAID

## 2020-11-19 ENCOUNTER — TELEPHONE (OUTPATIENT)
Dept: OBGYN CLINIC | Facility: CLINIC | Age: 30
End: 2020-11-19

## 2020-11-19 VITALS
WEIGHT: 182.81 LBS | DIASTOLIC BLOOD PRESSURE: 73 MMHG | SYSTOLIC BLOOD PRESSURE: 124 MMHG | BODY MASS INDEX: 32 KG/M2 | HEART RATE: 90 BPM

## 2020-11-19 DIAGNOSIS — Z34.83 ENCOUNTER FOR SUPERVISION OF OTHER NORMAL PREGNANCY IN THIRD TRIMESTER: Primary | ICD-10-CM

## 2020-11-19 PROCEDURE — 81002 URINALYSIS NONAUTO W/O SCOPE: CPT | Performed by: OBSTETRICS & GYNECOLOGY

## 2020-11-19 PROCEDURE — 0502F SUBSEQUENT PRENATAL CARE: CPT | Performed by: OBSTETRICS & GYNECOLOGY

## 2020-11-19 PROCEDURE — 3078F DIAST BP <80 MM HG: CPT | Performed by: OBSTETRICS & GYNECOLOGY

## 2020-11-19 PROCEDURE — 3074F SYST BP LT 130 MM HG: CPT | Performed by: OBSTETRICS & GYNECOLOGY

## 2020-11-19 NOTE — TELEPHONE ENCOUNTER
Pt dropped off FMLA and STD forms to be filled out. LEW gisned, payment will be collected once completed. Pt will call back with fax number to be sent to employer.

## 2020-11-19 NOTE — PROGRESS NOTES
Good fm. Kick cts. Plus 1 protein, normal bp, denied any headaches/visual changes/abd pain. Awaiting clean catch urine, if still plus 1 protein, will send to [facility] for evaluation.

## 2020-11-20 NOTE — TELEPHONE ENCOUNTER
Signed HIPAA w/o #4 filled out. [employer] disab and FMLA forms recvd. Sent mychart to pt to see if she needs forms faxed once completed and to provide fax #s.

## 2020-11-23 ENCOUNTER — TELEPHONE (OUTPATIENT)
Dept: OBGYN CLINIC | Facility: CLINIC | Age: 30
End: 2020-11-23

## 2020-11-25 ENCOUNTER — HOSPITAL ENCOUNTER (OUTPATIENT)
Facility: HOSPITAL | Age: 30
Setting detail: OBSERVATION
Discharge: HOME | End: 2020-11-25
Attending: OBSTETRICS & GYNECOLOGY | Admitting: OBSTETRICS & GYNECOLOGY
Payer: MEDICAID

## 2020-11-25 ENCOUNTER — ROUTINE PRENATAL (OUTPATIENT)
Dept: OBGYN CLINIC | Facility: CLINIC | Age: 30
End: 2020-11-25
Payer: MEDICAID

## 2020-11-25 VITALS
RESPIRATION RATE: 19 BRPM | BODY MASS INDEX: 32.25 KG/M2 | SYSTOLIC BLOOD PRESSURE: 120 MMHG | TEMPERATURE: 99 F | HEIGHT: 63 IN | DIASTOLIC BLOOD PRESSURE: 69 MMHG | HEART RATE: 87 BPM | WEIGHT: 182 LBS

## 2020-11-25 VITALS — SYSTOLIC BLOOD PRESSURE: 118 MMHG | WEIGHT: 181 LBS | BODY MASS INDEX: 32 KG/M2 | DIASTOLIC BLOOD PRESSURE: 74 MMHG

## 2020-11-25 DIAGNOSIS — Z34.80 SUPERVISION OF OTHER NORMAL PREGNANCY: Primary | ICD-10-CM

## 2020-11-25 PROCEDURE — 3078F DIAST BP <80 MM HG: CPT | Performed by: OBSTETRICS & GYNECOLOGY

## 2020-11-25 PROCEDURE — 99214 OFFICE O/P EST MOD 30 MIN: CPT

## 2020-11-25 PROCEDURE — 0502F SUBSEQUENT PRENATAL CARE: CPT | Performed by: OBSTETRICS & GYNECOLOGY

## 2020-11-25 PROCEDURE — 81002 URINALYSIS NONAUTO W/O SCOPE: CPT | Performed by: OBSTETRICS & GYNECOLOGY

## 2020-11-25 PROCEDURE — 59025 FETAL NON-STRESS TEST: CPT

## 2020-11-25 PROCEDURE — 3074F SYST BP LT 130 MM HG: CPT | Performed by: OBSTETRICS & GYNECOLOGY

## 2020-11-25 RX ORDER — SODIUM CHLORIDE, SODIUM LACTATE, POTASSIUM CHLORIDE, CALCIUM CHLORIDE 600; 310; 30; 20 MG/100ML; MG/100ML; MG/100ML; MG/100ML
INJECTION, SOLUTION INTRAVENOUS CONTINUOUS
Status: DISCONTINUED | OUTPATIENT
Start: 2020-11-25 | End: 2020-11-25

## 2020-11-25 NOTE — PROGRESS NOTES
Pt is a 35year old female admitted to TR3/TR3-A. Patient presents with:  R/o Rom: 38 4/7 weeks, felt wetness around 1300, here to r/o rupture     Pt is  38w4d intra-uterine pregnancy. History obtained, consents signed.  Oriented to room, staff, a

## 2020-11-25 NOTE — TRIAGE
[facility]OSP - [facility]      Triage Note    Teodora Joyce Patient Status:  Observation    10/2/1990 MRN [id number]   Location [facility] Attending Luís Mccallum MD   Hosp Day # 0 PCP Rylee Melendez.  Flip Gallagher, [address] [place] [address] Second Interpretation: Reactive          FHR Category: Category I           Additional Comments   Notified Dr Maria Isabel Casey of ferning non-present, contractions every4-7 min., and Reactive NST. received order to discharge to home and to follow up at next appt.   I

## 2020-11-26 ENCOUNTER — HOSPITAL ENCOUNTER (OUTPATIENT)
Facility: HOSPITAL | Age: 30
Setting detail: OBSERVATION
Discharge: HOME | End: 2020-11-26
Attending: OBSTETRICS & GYNECOLOGY | Admitting: OBSTETRICS & GYNECOLOGY
Payer: MEDICAID

## 2020-11-26 VITALS
BODY MASS INDEX: 32.07 KG/M2 | TEMPERATURE: 98 F | HEIGHT: 63 IN | RESPIRATION RATE: 18 BRPM | WEIGHT: 181 LBS | DIASTOLIC BLOOD PRESSURE: 49 MMHG | SYSTOLIC BLOOD PRESSURE: 99 MMHG | HEART RATE: 75 BPM

## 2020-11-26 PROCEDURE — 86900 BLOOD TYPING SEROLOGIC ABO: CPT | Performed by: OBSTETRICS & GYNECOLOGY

## 2020-11-26 PROCEDURE — 96361 HYDRATE IV INFUSION ADD-ON: CPT

## 2020-11-26 PROCEDURE — 86901 BLOOD TYPING SEROLOGIC RH(D): CPT | Performed by: OBSTETRICS & GYNECOLOGY

## 2020-11-26 PROCEDURE — 4A0HXCZ MEASUREMENT OF PRODUCTS OF CONCEPTION, CARDIAC RATE, EXTERNAL APPROACH: ICD-10-PCS | Performed by: OBSTETRICS & GYNECOLOGY

## 2020-11-26 PROCEDURE — 96366 THER/PROPH/DIAG IV INF ADDON: CPT

## 2020-11-26 PROCEDURE — 96365 THER/PROPH/DIAG IV INF INIT: CPT

## 2020-11-26 PROCEDURE — 99214 OFFICE O/P EST MOD 30 MIN: CPT

## 2020-11-26 PROCEDURE — 86850 RBC ANTIBODY SCREEN: CPT | Performed by: OBSTETRICS & GYNECOLOGY

## 2020-11-26 PROCEDURE — 85025 COMPLETE CBC W/AUTO DIFF WBC: CPT | Performed by: OBSTETRICS & GYNECOLOGY

## 2020-11-26 PROCEDURE — 59025 FETAL NON-STRESS TEST: CPT

## 2020-11-26 RX ORDER — ONDANSETRON 2 MG/ML
4 INJECTION INTRAMUSCULAR; INTRAVENOUS EVERY 6 HOURS PRN
Status: DISCONTINUED | OUTPATIENT
Start: 2020-11-26 | End: 2020-11-26

## 2020-11-26 RX ORDER — DEXTROSE, SODIUM CHLORIDE, SODIUM LACTATE, POTASSIUM CHLORIDE, AND CALCIUM CHLORIDE 5; .6; .31; .03; .02 G/100ML; G/100ML; G/100ML; G/100ML; G/100ML
INJECTION, SOLUTION INTRAVENOUS CONTINUOUS
Status: DISCONTINUED | OUTPATIENT
Start: 2020-11-26 | End: 2020-11-26

## 2020-11-26 RX ORDER — IBUPROFEN 600 MG/1
600 TABLET ORAL EVERY 6 HOURS PRN
Status: DISCONTINUED | OUTPATIENT
Start: 2020-11-26 | End: 2020-11-26

## 2020-11-26 RX ORDER — AMMONIA INHALANTS 0.04 G/.3ML
0.3 INHALANT RESPIRATORY (INHALATION) AS NEEDED
Status: DISCONTINUED | OUTPATIENT
Start: 2020-11-26 | End: 2020-11-26

## 2020-11-26 RX ORDER — TERBUTALINE SULFATE 1 MG/ML
0.25 INJECTION, SOLUTION SUBCUTANEOUS AS NEEDED
Status: DISCONTINUED | OUTPATIENT
Start: 2020-11-26 | End: 2020-11-26

## 2020-11-26 RX ORDER — ACETAMINOPHEN 500 MG
500 TABLET ORAL EVERY 6 HOURS PRN
Status: DISCONTINUED | OUTPATIENT
Start: 2020-11-26 | End: 2020-11-26

## 2020-11-26 RX ORDER — SODIUM CHLORIDE, SODIUM LACTATE, POTASSIUM CHLORIDE, CALCIUM CHLORIDE 600; 310; 30; 20 MG/100ML; MG/100ML; MG/100ML; MG/100ML
INJECTION, SOLUTION INTRAVENOUS CONTINUOUS
Status: DISCONTINUED | OUTPATIENT
Start: 2020-11-26 | End: 2020-11-26

## 2020-11-26 RX ORDER — LIDOCAINE HYDROCHLORIDE 10 MG/ML
30 INJECTION, SOLUTION EPIDURAL; INFILTRATION; INTRACAUDAL; PERINEURAL ONCE
Status: DISCONTINUED | OUTPATIENT
Start: 2020-11-26 | End: 2020-11-26

## 2020-11-26 RX ORDER — TRISODIUM CITRATE DIHYDRATE AND CITRIC ACID MONOHYDRATE 500; 334 MG/5ML; MG/5ML
30 SOLUTION ORAL AS NEEDED
Status: DISCONTINUED | OUTPATIENT
Start: 2020-11-26 | End: 2020-11-26

## 2020-11-26 NOTE — H&P
Frank Pena Patient Status:  Inpatient    10/2/1990 MRN [id number]   Location [facility] Attending Alber Marcus MD   Hosp Day # 0 PCP Jaja Dobbs.  Perla Cleaning,      Date RPR ([facility])        Urine Culture <10,000 cfu/ml Mixture of Gram positive organisms isolated - probable contamination. 05/29/20 1038      ,000 cfu/ml Multiple species present- probable contamination.     05/05/20 7319    Hepatitis B Nonreactive   No 35-37 Weeks     Test Value Reference Range Date Time    HCT 33.7 % 35.0 - 48.0 10/31/20 1154    HGB 11.1 g/dL 12.0 - 16.0 10/31/20 1154    Platelets 775.1 60(3).0 - 450.0 10/31/20 1154    TREP Negative  Negative 10/31/20 1154    RPR ([facility])        G Procedure Laterality Date   • HEMORRHOIDECTOMY  07/23/2019    L anterior midline   • HEMORRHOIDECTOMY N/A 7/23/2019    Performed by Franklin Shin MD at [address] [place] [address] MAIN OR     Family History:   Family History   Problem Relation Age of Onset   • Cancer Paternal Gr Component Value Date    TREPONEMALAB Negative 10/31/2020    HIV Non-Reactive 10/31/2020    HBVSAG Non-Reactive 05/19/2016    ABO A 05/29/2020    RH Positive 05/29/2020    WBC 9.5 10/31/2020    HGB 11.1 (L) 10/31/2020    HCT 33.7 (L) 10/31/2020    . 0

## 2020-11-26 NOTE — PLAN OF CARE
Problem: Patient Centered Care  Goal: Patient preferences are identified and integrated in the patient's plan of care  Description: Interventions:  - What would you like us to know as we care for you?  Pt desires epidural when appropriate; she has 2 daugh levels  Description: INTERVENTIONS:  - Administer medication as ordered  - Teach and rehearse alternative coping skills  - Provide emotional support with 1:1 interaction with staff  Outcome: Progressing

## 2020-11-26 NOTE — PROGRESS NOTES
Pt is a 35year old female admitted to [address] [place] [address]. Patient presents with:  R/o Labor   made change to 3cm. Pt is [id number] 38w5d intra-uterine pregnancy. History obtained, consents signed. Oriented to room, staff, and plan of care.

## 2020-11-26 NOTE — PROGRESS NOTES
Pt is a 35year old female admitted to TR1/TR1-A. Patient presents with:  R/o Labor     Pt is [id number] 38w5d intra-uterine pregnancy. History obtained, consents signed. Oriented to room, staff, and plan of care.

## 2020-11-30 ENCOUNTER — ANESTHESIA EVENT (OUTPATIENT)
Dept: OBGYN UNIT | Facility: HOSPITAL | Age: 30
End: 2020-11-30
Payer: MEDICAID

## 2020-11-30 ENCOUNTER — ANESTHESIA (OUTPATIENT)
Dept: OBGYN UNIT | Facility: HOSPITAL | Age: 30
End: 2020-11-30
Payer: MEDICAID

## 2020-11-30 ENCOUNTER — TELEPHONE (OUTPATIENT)
Dept: OBGYN CLINIC | Facility: CLINIC | Age: 30
End: 2020-11-30

## 2020-11-30 ENCOUNTER — HOSPITAL ENCOUNTER (INPATIENT)
Facility: HOSPITAL | Age: 30
LOS: 3 days | Discharge: HOME | End: 2020-12-03
Attending: OBSTETRICS & GYNECOLOGY | Admitting: OBSTETRICS & GYNECOLOGY
Payer: MEDICAID

## 2020-11-30 PROBLEM — O47.9 UTERINE CONTRACTIONS DURING PREGNANCY: Status: ACTIVE | Noted: 2020-11-30

## 2020-11-30 PROBLEM — O47.9 UTERINE CONTRACTIONS DURING PREGNANCY (HCC): Status: ACTIVE | Noted: 2020-11-30

## 2020-11-30 RX ORDER — HYDROMORPHONE HYDROCHLORIDE 1 MG/ML
0.2 INJECTION, SOLUTION INTRAMUSCULAR; INTRAVENOUS; SUBCUTANEOUS
Status: DISCONTINUED | OUTPATIENT
Start: 2020-11-30 | End: 2020-12-01 | Stop reason: HOSPADM

## 2020-11-30 RX ORDER — SODIUM CHLORIDE, SODIUM LACTATE, POTASSIUM CHLORIDE, CALCIUM CHLORIDE 600; 310; 30; 20 MG/100ML; MG/100ML; MG/100ML; MG/100ML
INJECTION, SOLUTION INTRAVENOUS CONTINUOUS
Status: DISCONTINUED | OUTPATIENT
Start: 2020-11-30 | End: 2020-12-01 | Stop reason: HOSPADM

## 2020-11-30 RX ORDER — BUPIVACAINE HYDROCHLORIDE 2.5 MG/ML
INJECTION, SOLUTION EPIDURAL; INFILTRATION; INTRACAUDAL AS NEEDED
Status: DISCONTINUED | OUTPATIENT
Start: 2020-11-30 | End: 2020-12-01 | Stop reason: SURG

## 2020-11-30 RX ORDER — LIDOCAINE HYDROCHLORIDE 10 MG/ML
30 INJECTION, SOLUTION EPIDURAL; INFILTRATION; INTRACAUDAL; PERINEURAL ONCE
Status: DISCONTINUED | OUTPATIENT
Start: 2020-11-30 | End: 2020-12-01 | Stop reason: HOSPADM

## 2020-11-30 RX ORDER — LIDOCAINE HYDROCHLORIDE 10 MG/ML
INJECTION, SOLUTION EPIDURAL; INFILTRATION; INTRACAUDAL; PERINEURAL AS NEEDED
Status: DISCONTINUED | OUTPATIENT
Start: 2020-11-30 | End: 2020-12-01 | Stop reason: SURG

## 2020-11-30 RX ORDER — TERBUTALINE SULFATE 1 MG/ML
0.25 INJECTION, SOLUTION SUBCUTANEOUS AS NEEDED
Status: DISCONTINUED | OUTPATIENT
Start: 2020-11-30 | End: 2020-12-01 | Stop reason: HOSPADM

## 2020-11-30 RX ORDER — AMMONIA INHALANTS 0.04 G/.3ML
0.3 INHALANT RESPIRATORY (INHALATION) AS NEEDED
Status: DISCONTINUED | OUTPATIENT
Start: 2020-11-30 | End: 2020-12-01

## 2020-11-30 RX ORDER — ZOLPIDEM TARTRATE 5 MG/1
10 TABLET ORAL NIGHTLY PRN
Status: DISCONTINUED | OUTPATIENT
Start: 2020-11-30 | End: 2020-12-03

## 2020-11-30 RX ORDER — IBUPROFEN 600 MG/1
600 TABLET ORAL EVERY 6 HOURS PRN
Status: DISCONTINUED | OUTPATIENT
Start: 2020-11-30 | End: 2020-12-01

## 2020-11-30 RX ORDER — BUPIVACAINE HYDROCHLORIDE 2.5 MG/ML
20 INJECTION, SOLUTION EPIDURAL; INFILTRATION; INTRACAUDAL ONCE
Status: DISCONTINUED | OUTPATIENT
Start: 2020-11-30 | End: 2020-12-01 | Stop reason: HOSPADM

## 2020-11-30 RX ORDER — LIDOCAINE HYDROCHLORIDE AND EPINEPHRINE 15; 5 MG/ML; UG/ML
INJECTION, SOLUTION EPIDURAL AS NEEDED
Status: DISCONTINUED | OUTPATIENT
Start: 2020-11-30 | End: 2020-12-01 | Stop reason: SURG

## 2020-11-30 RX ORDER — ACETAMINOPHEN 500 MG
500 TABLET ORAL EVERY 6 HOURS PRN
Status: DISCONTINUED | OUTPATIENT
Start: 2020-11-30 | End: 2020-12-01 | Stop reason: HOSPADM

## 2020-11-30 RX ORDER — TRISODIUM CITRATE DIHYDRATE AND CITRIC ACID MONOHYDRATE 500; 334 MG/5ML; MG/5ML
30 SOLUTION ORAL AS NEEDED
Status: DISCONTINUED | OUTPATIENT
Start: 2020-11-30 | End: 2020-12-01 | Stop reason: HOSPADM

## 2020-11-30 RX ORDER — DIPHENHYDRAMINE HYDROCHLORIDE 50 MG/ML
12.5 INJECTION INTRAMUSCULAR; INTRAVENOUS EVERY 4 HOURS PRN
Status: DISCONTINUED | OUTPATIENT
Start: 2020-11-30 | End: 2020-12-03

## 2020-11-30 RX ORDER — HYDROXYZINE HYDROCHLORIDE 50 MG/ML
50 INJECTION, SOLUTION INTRAMUSCULAR EVERY 4 HOURS PRN
Status: DISCONTINUED | OUTPATIENT
Start: 2020-11-30 | End: 2020-12-01 | Stop reason: HOSPADM

## 2020-11-30 RX ORDER — LIDOCAINE HYDROCHLORIDE AND EPINEPHRINE 20; 5 MG/ML; UG/ML
20 INJECTION, SOLUTION EPIDURAL; INFILTRATION; INTRACAUDAL; PERINEURAL ONCE
Status: DISCONTINUED | OUTPATIENT
Start: 2020-11-30 | End: 2020-12-03

## 2020-11-30 RX ORDER — BUPIVACAINE HCL/0.9 % NACL/PF 0.25 %
5 PLASTIC BAG, INJECTION (ML) EPIDURAL AS NEEDED
Status: DISCONTINUED | OUTPATIENT
Start: 2020-11-30 | End: 2020-12-03

## 2020-11-30 RX ORDER — ONDANSETRON 2 MG/ML
4 INJECTION INTRAMUSCULAR; INTRAVENOUS EVERY 6 HOURS PRN
Status: DISCONTINUED | OUTPATIENT
Start: 2020-11-30 | End: 2020-12-01

## 2020-11-30 RX ADMIN — LIDOCAINE HYDROCHLORIDE 3 ML: 10 INJECTION, SOLUTION EPIDURAL; INFILTRATION; INTRACAUDAL; PERINEURAL at 22:50:00

## 2020-11-30 RX ADMIN — BUPIVACAINE HYDROCHLORIDE 2 MG: 2.5 INJECTION, SOLUTION EPIDURAL; INFILTRATION; INTRACAUDAL at 22:54:00

## 2020-11-30 RX ADMIN — LIDOCAINE HYDROCHLORIDE AND EPINEPHRINE 3 ML: 15; 5 INJECTION, SOLUTION EPIDURAL at 22:55:00

## 2020-11-30 NOTE — PROGRESS NOTES
Pt is a 35year old female admitted to LDR3/LDR3-A. Patient presents with:  Laboring     Pt is [id number] 39w2d intra-uterine pregnancy. History obtained, consents signed. Oriented to room, staff, and plan of care.

## 2020-11-30 NOTE — TELEPHONE ENCOUNTER
OB History     T2    L2    SAB1  TAB0  Ectopic0  Multiple0  Live Births2    39w2d    Patient name and  verified. Patient states she is having contractions every 2-3 minutes since this am, +FM, denies leaking any fluid. Advised to go directly to [facility]. SJM in office and recommends going to nearest hospital. Advised patient of [facility] recommendations and states she is already in route to [facility]. Advised patient if she feels that she needs to start pushing to pull over and call 911. patient verbalized understanding. [facility] notified.
Patient states she is having contractions. Patient states they are more frequent than yesterday and are lasting in between 3-5 minutes.
IPG Placement

## 2020-12-01 PROCEDURE — 59409 OBSTETRICAL CARE: CPT | Performed by: OBSTETRICS & GYNECOLOGY

## 2020-12-01 PROCEDURE — 0KQM0ZZ REPAIR PERINEUM MUSCLE, OPEN APPROACH: ICD-10-PCS | Performed by: OBSTETRICS & GYNECOLOGY

## 2020-12-01 RX ORDER — IBUPROFEN 600 MG/1
600 TABLET ORAL EVERY 6 HOURS PRN
Status: DISCONTINUED | OUTPATIENT
Start: 2020-12-01 | End: 2020-12-03

## 2020-12-01 RX ORDER — BISACODYL 10 MG
10 SUPPOSITORY, RECTAL RECTAL ONCE AS NEEDED
Status: DISCONTINUED | OUTPATIENT
Start: 2020-12-01 | End: 2020-12-03

## 2020-12-01 RX ORDER — DOCUSATE SODIUM 100 MG/1
100 CAPSULE, LIQUID FILLED ORAL
Status: DISCONTINUED | OUTPATIENT
Start: 2020-12-01 | End: 2020-12-03

## 2020-12-01 RX ORDER — DIAPER,BRIEF,INFANT-TODD,DISP
1 EACH MISCELLANEOUS EVERY 6 HOURS PRN
Status: DISCONTINUED | OUTPATIENT
Start: 2020-12-01 | End: 2020-12-03

## 2020-12-01 RX ORDER — ONDANSETRON 2 MG/ML
4 INJECTION INTRAMUSCULAR; INTRAVENOUS EVERY 6 HOURS PRN
Status: DISCONTINUED | OUTPATIENT
Start: 2020-12-01 | End: 2020-12-03

## 2020-12-01 RX ORDER — AMMONIA INHALANTS 0.04 G/.3ML
0.3 INHALANT RESPIRATORY (INHALATION) AS NEEDED
Status: DISCONTINUED | OUTPATIENT
Start: 2020-12-01 | End: 2020-12-03

## 2020-12-01 RX ORDER — SIMETHICONE 80 MG
80 TABLET,CHEWABLE ORAL 3 TIMES DAILY PRN
Status: DISCONTINUED | OUTPATIENT
Start: 2020-12-01 | End: 2020-12-03

## 2020-12-01 RX ORDER — ACETAMINOPHEN 325 MG/1
650 TABLET ORAL EVERY 6 HOURS PRN
Status: DISCONTINUED | OUTPATIENT
Start: 2020-12-01 | End: 2020-12-03

## 2020-12-01 NOTE — L&D DELIVERY NOTE
[facility]    Vaginal Delivery Note    Miller Simone Patient Status:  Observation    10/2/1990 MRN [id number]   Location [address]  Attending Ryan Brand MD   [facility] Day # 1 PCP Na Trotter.  DO Debbi

## 2020-12-01 NOTE — ANESTHESIA PROCEDURE NOTES
Labor Analgesia  Performed by: Maykel Weston DO  Authorized by: Maykel Weston DO       General Information and Staff    Start Time:   Anesthesiologist: Maykel Weston DO  Performed by:   Anesthesiologist  Patient Location:  OB  Reason for Block: labor epi

## 2020-12-01 NOTE — H&P
Frank Pena Patient Status:  Observation    10/2/1990 MRN [id number]   Location [facility] Attending Olimpia Sutton MD   Hosp Day # 1 PCP Rylee Werner DO     Ishan Smoking status: Former Smoker        Types: Cigarettes        Quit date: 2016        Years since quittin.6      Smokeless tobacco: Never Used    Alcohol use: No      Alcohol/week: 0.0 standard drinks       Allergies/Medications:    Allergies:   No right     Lump or mass in breast     Pregnancy     Yeast vaginitis     Group B Streptococcus urinary tract infection affecting pregnancy, antepartum     Uterine contractions during pregnancy                                           Lula Jasso is at a

## 2020-12-01 NOTE — PLAN OF CARE
Problem: SKIN/TISSUE INTEGRITY - ADULT  Goal: Skin integrity remains intact  Description: INTERVENTIONS  - Assess and document risk factors for pressure ulcer development  - Assess and document skin integrity  - Monitor for areas of redness and/or skin b 1:1 interaction with staff  Outcome: Progressing     Problem: Patient/Family Goals  Goal: Patient/Family Long Term Goal  Description: Patient's Long Term Goal: uncomplicated delivery    Interventions:  - induction/augmentation per protocol.   - education

## 2020-12-01 NOTE — ANESTHESIA POSTPROCEDURE EVALUATION
Patient: Milena Serrano    Procedure Summary     Date: 11/30/20 Room / Location:     Anesthesia Start: 0014 Anesthesia Stop: 12/01/20 0159    Procedure: LABOR ANALGESIA Diagnosis:     Scheduled Providers:  Anesthesiologist: Viviana Phelps MD    Anesth

## 2020-12-02 NOTE — PLAN OF CARE
Continue current plan of care. Problem: POSTPARTUM  Goal: Long Term Goal:Experiences normal postpartum course  Description: INTERVENTIONS:  - Assess and monitor vital signs and lab values. - Assess fundus and lochia.   - Provide ice/sitz baths for becca and monitor for signs of nipple pain/trauma. - Instruct and provide assistance with proper latch. - Review techniques for milk expression (breast pumping) and storage of breast milk.  Provide pumping equipment/supplies, instructions and assistance, as fidel

## 2020-12-02 NOTE — PROGRESS NOTES
Patient up to bathroom with assist x 2. Voided 500. Patient transferred to mother/baby room 351 per wheelchair in stable condition with baby and personal belongings. Accompanied by significant other and staff. Report given to mother/baby Pao SUGGS.

## 2020-12-02 NOTE — PAYOR COMM NOTE
--------------  ADMISSION REVIEW     Payor: Pranav Dominguez #:  [id number]  Authorization Number: [id number]    Admit date: 11/30/20  Admit time: 1644       Admitting Physician: Lisa Proctor MD  Attending Physicia •  (normal spontaneous vaginal delivery)    •  (normal spontaneous vaginal delivery)    • Psoriasis    • Visual impairment     glasses     Past Social History:   Past Surgical History:   Procedure Laterality Date   • HEMORRHOIDECTOMY   @BRIEFLAB(TREPONEMALAB,RAPIDHIVSCRN,HBVSAG,ABO,RH,WBC,HGB,HCT,PLT,CREATSERUM,BUN,NA,K,CL,CO2,GLU,CA,ALB,ALKPHO,BILT,TP,AST,ALT,PTT,INR,PT,T4F,TSH,TSHREFLEX,ZEINA,LIP,GGT,DDIMER,ESRML,ESRPF,CRP,BNP,MG,PHOS,TROP,CK,CKMB,RACHELE,RPR,RF,B12,ETOH,COLORUR,CLARITY,SPEC Benzocaine-Menthol (DERMOPLAST) 20-0.5 % topical spray 1 spray     Date Action Dose Route User    12/1/2020 2131 Given 1 spray Topical Gala Mcgee RN      docusate sodium (COLACE) cap 100 mg     Date Action Dose Route User    12/1/2020 2214 Given 100 Episiotomy/Laceration Repair  Episiotomy: none  Laceration: perineal 2nd degree     Delivery Complications  Vacuum Assistance: indication for: non-reassuring fetal status. Verbal consent obtained. Station of fetal vertex is outlet (head at perineum).  Blad    Input/Output:     Intake/Output Summary (Last 24 hours) at 12/2/2020 0719  Last data filed at 12/1/2020 2330      Gross per 24 hour   Intake 1780.1 ml   Output 2200 ml   Net -419.9 ml         Weight (Last 6):   Wt Readings from Last 6 Encounters:  12/01/

## 2020-12-02 NOTE — LACTATION NOTE
LACTATION NOTE - MOTHER      Evaluation Type: Inpatient    Problems identified  Problems identified: Knowledge deficit;Milk supply WNL    Maternal history  Maternal history: Depression    Breastfeeding goal  Breastfeeding goal: To maintain breast milk feed

## 2020-12-02 NOTE — PLAN OF CARE
Problem: SKIN/TISSUE INTEGRITY - ADULT  Goal: Skin integrity remains intact  Description: INTERVENTIONS  - Assess and document risk factors for pressure ulcer development  - Assess and document skin integrity  - Monitor for areas of redness and/or skin b report anxiety at manageable levels  Description: INTERVENTIONS:  - Administer medication as ordered  - Teach and rehearse alternative coping skills  - Provide emotional support with 1:1 interaction with staff  12/1/2020 1836 by Blanche Broussard RN  Outcome: C Completed  12/1/2020 1001 by Mirella Troy RN  Outcome: Progressing

## 2020-12-02 NOTE — LACTATION NOTE
This note was copied from a baby's chart. LACTATION NOTE - INFANT    Evaluation Type  Evaluation Type: Inpatient    Problems & Assessment  Problems Diagnosed or Identified: Latch difficulty;Sleepy; Shallow latch  Problems: comment/detail: Vacuum delivery,

## 2020-12-03 ENCOUNTER — TELEPHONE (OUTPATIENT)
Dept: OBGYN CLINIC | Facility: CLINIC | Age: 30
End: 2020-12-03

## 2020-12-03 VITALS
HEART RATE: 74 BPM | BODY MASS INDEX: 32.07 KG/M2 | HEIGHT: 62.99 IN | WEIGHT: 181 LBS | SYSTOLIC BLOOD PRESSURE: 104 MMHG | TEMPERATURE: 98 F | DIASTOLIC BLOOD PRESSURE: 46 MMHG | RESPIRATION RATE: 16 BRPM | OXYGEN SATURATION: 99 %

## 2020-12-03 RX ORDER — DOCUSATE SODIUM 100 MG/1
100 CAPSULE, LIQUID FILLED ORAL
Status: DISCONTINUED | OUTPATIENT
Start: 2020-12-03 | End: 2020-12-03

## 2020-12-03 NOTE — PLAN OF CARE
Problem: SKIN/TISSUE INTEGRITY - ADULT  Goal: Skin integrity remains intact  Description: INTERVENTIONS  - Assess and document risk factors for pressure ulcer development  - Assess and document skin integrity  - Monitor for areas of redness and/or skin b feeding.  - Provide information as needed about early infant feeding cues (e.g., rooting, lip smacking, sucking fingers/hand) versus late cue of crying.  - Discuss/demonstrate breast feeding aids (e.g., infant sling, nursing footstool/pillows, and breast p needed.   Outcome: Completed

## 2020-12-03 NOTE — TELEPHONE ENCOUNTER
Patient name and  verified. Patient informed FMLA paperwork is completed by forms dept and number given. Advised turnaround time is 10-15 business days. Patient verbalized understanding.     Routing to forms dept as Mariana Lambert

## 2020-12-03 NOTE — DISCHARGE SUMMARY
[facility] - [facility]    Discharge Summary    Slava Guo Patient Status:  Inpatient    10/2/1990 MRN [id number]   Location [facility] Attending Kecia Lofton MD   [facility] Day # 3 PCP Thee Reeves DO     Primary OB Clinician

## 2020-12-04 NOTE — TELEPHONE ENCOUNTER
Pt would like forms faxed to [facility] and uploaded to [employer] once completed

## 2020-12-07 NOTE — TELEPHONE ENCOUNTER
Patients last day worked was 11/3/20. Exposed to [place], quarantined and then doctor approved her off until delivery.  She delivered 12/1

## 2020-12-08 NOTE — TELEPHONE ENCOUNTER
Dr. Lacey Carlin,    [address] [place] [address]   Please sign off on form:  -Highlight the patient and hit \"Chart\" button. -In Chart Review, w/in the Encounter tab - click 1 time on the Telephone call encounter for 11/19/20 Scroll down the telephone encounter.

## 2020-12-09 NOTE — TELEPHONE ENCOUNTER
Faxed FMLA and Disab to [employer] [place] - [phone].  Uploaded copies to pt's [facility].

## 2020-12-11 ENCOUNTER — TELEPHONE (OUTPATIENT)
Dept: OBGYN CLINIC | Facility: CLINIC | Age: 30
End: 2020-12-11

## 2020-12-11 ENCOUNTER — TELEPHONE (OUTPATIENT)
Dept: OBGYN UNIT | Facility: HOSPITAL | Age: 30
End: 2020-12-11

## 2020-12-11 NOTE — TELEPHONE ENCOUNTER
Please inform,  Pt may use anusol cream , avoid pushing with bowel movements.   If still needs help, please refer to dr Vin Hwang

## 2020-12-11 NOTE — TELEPHONE ENCOUNTER
Would like to know if there is something she can be prescribed for hemorrhoids. She has had them since giving birth to the baby. Please advise.

## 2020-12-12 NOTE — TELEPHONE ENCOUNTER
Patient informed okay to use anusol, avoid pushing hard with BM, patient can also use prune juice, colace or metamucil to decrease constipation

## 2021-01-08 ENCOUNTER — OFFICE VISIT (OUTPATIENT)
Dept: SURGERY | Facility: CLINIC | Age: 31
End: 2021-01-08
Payer: MEDICAID

## 2021-01-08 VITALS — BODY MASS INDEX: 32 KG/M2 | WEIGHT: 181 LBS

## 2021-01-08 DIAGNOSIS — K64.4 ANAL SKIN TAG: ICD-10-CM

## 2021-01-08 DIAGNOSIS — K60.2 ANAL FISSURE: ICD-10-CM

## 2021-01-08 DIAGNOSIS — K64.8 INTERNAL HEMORRHOIDS WITH COMPLICATION: Primary | ICD-10-CM

## 2021-01-08 PROCEDURE — 46600 DIAGNOSTIC ANOSCOPY SPX: CPT | Performed by: SURGERY

## 2021-01-09 NOTE — PROGRESS NOTES
Established Patient Follow-up      1/8/2021    Latrice Box 35year old      HPI  Patient presents with:  Hemorrhoids: Patient states she has severe pain and bleeding with every bowel movement.   States she has pain all the time    Breast feeding, uncomp

## 2021-01-12 ENCOUNTER — POSTPARTUM (OUTPATIENT)
Dept: OBGYN CLINIC | Facility: CLINIC | Age: 31
End: 2021-01-12
Payer: MEDICAID

## 2021-01-12 VITALS — SYSTOLIC BLOOD PRESSURE: 110 MMHG | WEIGHT: 164.19 LBS | DIASTOLIC BLOOD PRESSURE: 76 MMHG | BODY MASS INDEX: 29 KG/M2

## 2021-01-12 PROCEDURE — 3074F SYST BP LT 130 MM HG: CPT | Performed by: OBSTETRICS & GYNECOLOGY

## 2021-01-12 PROCEDURE — 3078F DIAST BP <80 MM HG: CPT | Performed by: OBSTETRICS & GYNECOLOGY

## 2021-01-12 PROCEDURE — 0503F POSTPARTUM CARE VISIT: CPT | Performed by: OBSTETRICS & GYNECOLOGY

## 2021-01-12 NOTE — PROGRESS NOTES
HPI:    Patient ID: Elena Kaufman is a 35year old female. Patient here for postpartum exam.  No C/Os. Breast feeding going well. Desires Elective Sterilization. Will refer to colleague for consultation and surgery.   Encouraged abstinence or at [place] [address] no cervical adenopathy. Neurological: She is alert and oriented to person, place, and time. Skin: Skin is warm and dry. Psychiatric: She has a normal mood and affect.  Her behavior is normal. Judgment and thought content normal.   Nursing note and vit

## 2021-01-19 ENCOUNTER — OFFICE VISIT (OUTPATIENT)
Dept: OBGYN CLINIC | Facility: CLINIC | Age: 31
End: 2021-01-19
Payer: MEDICAID

## 2021-01-19 ENCOUNTER — TELEPHONE (OUTPATIENT)
Dept: OBGYN CLINIC | Facility: CLINIC | Age: 31
End: 2021-01-19

## 2021-01-19 VITALS
SYSTOLIC BLOOD PRESSURE: 119 MMHG | DIASTOLIC BLOOD PRESSURE: 73 MMHG | BODY MASS INDEX: 29 KG/M2 | WEIGHT: 164.19 LBS | HEART RATE: 85 BPM

## 2021-01-19 DIAGNOSIS — Z30.09 CONSULTATION FOR FEMALE STERILIZATION: Primary | ICD-10-CM

## 2021-01-19 DIAGNOSIS — Z30.2 STERILIZATION: Primary | ICD-10-CM

## 2021-01-19 PROBLEM — O47.9 UTERINE CONTRACTIONS DURING PREGNANCY: Status: RESOLVED | Noted: 2020-11-30 | Resolved: 2021-01-19

## 2021-01-19 PROBLEM — O23.40 GROUP B STREPTOCOCCUS URINARY TRACT INFECTION AFFECTING PREGNANCY, ANTEPARTUM: Status: RESOLVED | Noted: 2020-11-15 | Resolved: 2021-01-19

## 2021-01-19 PROBLEM — B95.1 GROUP B STREPTOCOCCUS URINARY TRACT INFECTION AFFECTING PREGNANCY, ANTEPARTUM (HCC): Status: RESOLVED | Noted: 2020-11-15 | Resolved: 2021-01-19

## 2021-01-19 PROBLEM — Z34.90 PREGNANCY: Status: RESOLVED | Noted: 2020-08-02 | Resolved: 2021-01-19

## 2021-01-19 PROBLEM — B95.1 GROUP B STREPTOCOCCUS URINARY TRACT INFECTION AFFECTING PREGNANCY, ANTEPARTUM: Status: RESOLVED | Noted: 2020-11-15 | Resolved: 2021-01-19

## 2021-01-19 PROBLEM — O23.40 GROUP B STREPTOCOCCUS URINARY TRACT INFECTION AFFECTING PREGNANCY, ANTEPARTUM (HCC): Status: RESOLVED | Noted: 2020-11-15 | Resolved: 2021-01-19

## 2021-01-19 PROBLEM — O47.9 UTERINE CONTRACTIONS DURING PREGNANCY (HCC): Status: RESOLVED | Noted: 2020-11-30 | Resolved: 2021-01-19

## 2021-01-19 PROBLEM — Z34.80 SUPERVISION OF OTHER NORMAL PREGNANCY: Status: RESOLVED | Noted: 2020-07-10 | Resolved: 2021-01-19

## 2021-01-19 PROBLEM — Z34.90 PREGNANCY (HCC): Status: RESOLVED | Noted: 2020-08-02 | Resolved: 2021-01-19

## 2021-01-19 PROCEDURE — 3074F SYST BP LT 130 MM HG: CPT | Performed by: OBSTETRICS & GYNECOLOGY

## 2021-01-19 PROCEDURE — 99214 OFFICE O/P EST MOD 30 MIN: CPT | Performed by: OBSTETRICS & GYNECOLOGY

## 2021-01-19 PROCEDURE — 3078F DIAST BP <80 MM HG: CPT | Performed by: OBSTETRICS & GYNECOLOGY

## 2021-01-20 NOTE — PROGRESS NOTES
[facility], [facility]  Obstetrics and Gynecology  Focused Gynecology Problem Exam  Elke Stafford MD    Maribel Reece is a 35year old female presenting for Consult  .     HPI:   Patient presents with:  Consult    Patient is status post normal spontaneous vagi • HEMORRHOIDECTOMY N/A 7/23/2019    Performed by Marilou Rubio MD at [facility] MAIN OR       Family History   Problem Relation Age of Onset   • Cancer Paternal Grandmother         stomach   • Gastro-Intestinal Disorder Maternal Grandmother         bleeding ul Service: Not Asked        Blood Transfusions: Not Asked        Caffeine Concern: Yes          coffee,soda        Occupational Exposure: Not Asked        Hobby Hazards: Not Asked        Sleep Concern: Not Asked        Stress Concern: Not Asked        Weight the age of 28. I discussed reversible types of long-term contraception including ParaGard IUD, progesterone containing IUDs and Nexplanon. Patient understands all the above and states that she would like permanent, nonreversible sterilization.   Since she

## 2021-01-20 NOTE — TELEPHONE ENCOUNTER
Please schedule the following surgery:    Procedure: Laparoscopic bilateral salpingectomy with possible laparotomy  Assist: (Y/N or none) no  Date: As soon as possible per patient's request  Dx: (Z30.09) Consultation for female sterilization  (primary enco

## 2021-01-22 NOTE — TELEPHONE ENCOUNTER
Either date is fine with me, although I would prefer not a 730 start. However, what ever works best for the patient schedule is fine with me.

## 2021-01-25 NOTE — TELEPHONE ENCOUNTER
Pt scheduled 2/18 at 8:30 (tentative) till blocks are dropped. Pt informed of date and time. She Understood and verbalized agreement. Agreed for sx guide to be sent via my chart.   No further questions

## 2021-02-11 ENCOUNTER — MED REC SCAN ONLY (OUTPATIENT)
Dept: OBGYN CLINIC | Facility: CLINIC | Age: 31
End: 2021-02-11

## 2021-02-16 ENCOUNTER — LAB ENCOUNTER (OUTPATIENT)
Dept: LAB | Age: 31
End: 2021-02-16
Attending: OBSTETRICS & GYNECOLOGY
Payer: MEDICAID

## 2021-02-16 DIAGNOSIS — Z01.818 PREOP TESTING: ICD-10-CM

## 2021-02-17 ENCOUNTER — ANESTHESIA EVENT (OUTPATIENT)
Dept: SURGERY | Facility: HOSPITAL | Age: 31
End: 2021-02-17
Payer: MEDICAID

## 2021-02-17 LAB — SARS-COV-2 RNA RESP QL NAA+PROBE: NOT DETECTED

## 2021-02-18 ENCOUNTER — HOSPITAL ENCOUNTER (OUTPATIENT)
Facility: HOSPITAL | Age: 31
Setting detail: HOSPITAL OUTPATIENT SURGERY
Discharge: HOME | End: 2021-02-18
Attending: OBSTETRICS & GYNECOLOGY | Admitting: OBSTETRICS & GYNECOLOGY
Payer: MEDICAID

## 2021-02-18 ENCOUNTER — ANESTHESIA (OUTPATIENT)
Dept: SURGERY | Facility: HOSPITAL | Age: 31
End: 2021-02-18
Payer: MEDICAID

## 2021-02-18 VITALS
HEART RATE: 88 BPM | RESPIRATION RATE: 16 BRPM | WEIGHT: 163 LBS | OXYGEN SATURATION: 97 % | SYSTOLIC BLOOD PRESSURE: 107 MMHG | DIASTOLIC BLOOD PRESSURE: 60 MMHG | HEIGHT: 63 IN | BODY MASS INDEX: 28.88 KG/M2 | TEMPERATURE: 98 F

## 2021-02-18 DIAGNOSIS — Z30.2 STERILIZATION: ICD-10-CM

## 2021-02-18 DIAGNOSIS — Z01.818 PREOP TESTING: Primary | ICD-10-CM

## 2021-02-18 LAB — B-HCG UR QL: NEGATIVE

## 2021-02-18 PROCEDURE — 58661 LAPAROSCOPY REMOVE ADNEXA: CPT | Performed by: OBSTETRICS & GYNECOLOGY

## 2021-02-18 PROCEDURE — 0UT74ZZ RESECTION OF BILATERAL FALLOPIAN TUBES, PERCUTANEOUS ENDOSCOPIC APPROACH: ICD-10-PCS | Performed by: OBSTETRICS & GYNECOLOGY

## 2021-02-18 RX ORDER — HYDROMORPHONE HYDROCHLORIDE 1 MG/ML
0.4 INJECTION, SOLUTION INTRAMUSCULAR; INTRAVENOUS; SUBCUTANEOUS EVERY 5 MIN PRN
Status: DISCONTINUED | OUTPATIENT
Start: 2021-02-18 | End: 2021-02-18

## 2021-02-18 RX ORDER — HYDROCODONE BITARTRATE AND ACETAMINOPHEN 5; 325 MG/1; MG/1
2 TABLET ORAL AS NEEDED
Status: DISCONTINUED | OUTPATIENT
Start: 2021-02-18 | End: 2021-02-18

## 2021-02-18 RX ORDER — ONDANSETRON 2 MG/ML
INJECTION INTRAMUSCULAR; INTRAVENOUS AS NEEDED
Status: DISCONTINUED | OUTPATIENT
Start: 2021-02-18 | End: 2021-02-18 | Stop reason: SURG

## 2021-02-18 RX ORDER — ACETAMINOPHEN 500 MG
1000 TABLET ORAL ONCE
Status: COMPLETED | OUTPATIENT
Start: 2021-02-18 | End: 2021-02-18

## 2021-02-18 RX ORDER — ROCURONIUM BROMIDE 10 MG/ML
INJECTION, SOLUTION INTRAVENOUS AS NEEDED
Status: DISCONTINUED | OUTPATIENT
Start: 2021-02-18 | End: 2021-02-18 | Stop reason: SURG

## 2021-02-18 RX ORDER — MORPHINE SULFATE 4 MG/ML
2 INJECTION, SOLUTION INTRAMUSCULAR; INTRAVENOUS EVERY 10 MIN PRN
Status: DISCONTINUED | OUTPATIENT
Start: 2021-02-18 | End: 2021-02-18

## 2021-02-18 RX ORDER — LIDOCAINE HYDROCHLORIDE 10 MG/ML
INJECTION, SOLUTION EPIDURAL; INFILTRATION; INTRACAUDAL; PERINEURAL AS NEEDED
Status: DISCONTINUED | OUTPATIENT
Start: 2021-02-18 | End: 2021-02-18 | Stop reason: SURG

## 2021-02-18 RX ORDER — HYDROCODONE BITARTRATE AND ACETAMINOPHEN 5; 325 MG/1; MG/1
1 TABLET ORAL EVERY 6 HOURS PRN
Qty: 6 TABLET | Refills: 0 | Status: SHIPPED | OUTPATIENT
Start: 2021-02-18

## 2021-02-18 RX ORDER — IBUPROFEN 600 MG/1
600 TABLET ORAL EVERY 6 HOURS PRN
Qty: 30 TABLET | Refills: 0 | Status: SHIPPED | OUTPATIENT
Start: 2021-02-18

## 2021-02-18 RX ORDER — NEOSTIGMINE METHYLSULFATE 1 MG/ML
INJECTION INTRAVENOUS AS NEEDED
Status: DISCONTINUED | OUTPATIENT
Start: 2021-02-18 | End: 2021-02-18 | Stop reason: SURG

## 2021-02-18 RX ORDER — HYDROMORPHONE HYDROCHLORIDE 1 MG/ML
0.2 INJECTION, SOLUTION INTRAMUSCULAR; INTRAVENOUS; SUBCUTANEOUS EVERY 5 MIN PRN
Status: DISCONTINUED | OUTPATIENT
Start: 2021-02-18 | End: 2021-02-18

## 2021-02-18 RX ORDER — GLYCOPYRROLATE 0.2 MG/ML
INJECTION, SOLUTION INTRAMUSCULAR; INTRAVENOUS AS NEEDED
Status: DISCONTINUED | OUTPATIENT
Start: 2021-02-18 | End: 2021-02-18 | Stop reason: SURG

## 2021-02-18 RX ORDER — HYDROMORPHONE HYDROCHLORIDE 1 MG/ML
0.6 INJECTION, SOLUTION INTRAMUSCULAR; INTRAVENOUS; SUBCUTANEOUS EVERY 5 MIN PRN
Status: DISCONTINUED | OUTPATIENT
Start: 2021-02-18 | End: 2021-02-18

## 2021-02-18 RX ORDER — MEPERIDINE HYDROCHLORIDE 25 MG/ML
25 INJECTION INTRAMUSCULAR; INTRAVENOUS; SUBCUTANEOUS ONCE
Status: COMPLETED | OUTPATIENT
Start: 2021-02-18 | End: 2021-02-18

## 2021-02-18 RX ORDER — MORPHINE SULFATE 10 MG/ML
6 INJECTION, SOLUTION INTRAMUSCULAR; INTRAVENOUS EVERY 10 MIN PRN
Status: DISCONTINUED | OUTPATIENT
Start: 2021-02-18 | End: 2021-02-18

## 2021-02-18 RX ORDER — HYDROCODONE BITARTRATE AND ACETAMINOPHEN 5; 325 MG/1; MG/1
1 TABLET ORAL AS NEEDED
Status: DISCONTINUED | OUTPATIENT
Start: 2021-02-18 | End: 2021-02-18

## 2021-02-18 RX ORDER — SODIUM CHLORIDE, SODIUM LACTATE, POTASSIUM CHLORIDE, CALCIUM CHLORIDE 600; 310; 30; 20 MG/100ML; MG/100ML; MG/100ML; MG/100ML
INJECTION, SOLUTION INTRAVENOUS CONTINUOUS
Status: DISCONTINUED | OUTPATIENT
Start: 2021-02-18 | End: 2021-02-18

## 2021-02-18 RX ORDER — ONDANSETRON 2 MG/ML
4 INJECTION INTRAMUSCULAR; INTRAVENOUS ONCE AS NEEDED
Status: DISCONTINUED | OUTPATIENT
Start: 2021-02-18 | End: 2021-02-18

## 2021-02-18 RX ORDER — KETOROLAC TROMETHAMINE 30 MG/ML
INJECTION, SOLUTION INTRAMUSCULAR; INTRAVENOUS AS NEEDED
Status: DISCONTINUED | OUTPATIENT
Start: 2021-02-18 | End: 2021-02-18 | Stop reason: SURG

## 2021-02-18 RX ORDER — HALOPERIDOL 5 MG/ML
0.25 INJECTION INTRAMUSCULAR ONCE AS NEEDED
Status: DISCONTINUED | OUTPATIENT
Start: 2021-02-18 | End: 2021-02-18

## 2021-02-18 RX ORDER — MORPHINE SULFATE 4 MG/ML
4 INJECTION, SOLUTION INTRAMUSCULAR; INTRAVENOUS EVERY 10 MIN PRN
Status: DISCONTINUED | OUTPATIENT
Start: 2021-02-18 | End: 2021-02-18

## 2021-02-18 RX ORDER — BUPIVACAINE HYDROCHLORIDE 2.5 MG/ML
INJECTION, SOLUTION EPIDURAL; INFILTRATION; INTRACAUDAL AS NEEDED
Status: DISCONTINUED | OUTPATIENT
Start: 2021-02-18 | End: 2021-02-18 | Stop reason: HOSPADM

## 2021-02-18 RX ORDER — DEXAMETHASONE SODIUM PHOSPHATE 4 MG/ML
VIAL (ML) INJECTION AS NEEDED
Status: DISCONTINUED | OUTPATIENT
Start: 2021-02-18 | End: 2021-02-18 | Stop reason: SURG

## 2021-02-18 RX ORDER — PROCHLORPERAZINE EDISYLATE 5 MG/ML
5 INJECTION INTRAMUSCULAR; INTRAVENOUS ONCE AS NEEDED
Status: DISCONTINUED | OUTPATIENT
Start: 2021-02-18 | End: 2021-02-18

## 2021-02-18 RX ORDER — NALOXONE HYDROCHLORIDE 0.4 MG/ML
80 INJECTION, SOLUTION INTRAMUSCULAR; INTRAVENOUS; SUBCUTANEOUS AS NEEDED
Status: DISCONTINUED | OUTPATIENT
Start: 2021-02-18 | End: 2021-02-18

## 2021-02-18 RX ADMIN — ROCURONIUM BROMIDE 30 MG: 10 INJECTION, SOLUTION INTRAVENOUS at 07:20:00

## 2021-02-18 RX ADMIN — DEXAMETHASONE SODIUM PHOSPHATE 8 MG: 4 MG/ML VIAL (ML) INJECTION at 07:30:00

## 2021-02-18 RX ADMIN — LIDOCAINE HYDROCHLORIDE 50 MG: 10 INJECTION, SOLUTION EPIDURAL; INFILTRATION; INTRACAUDAL; PERINEURAL at 07:20:00

## 2021-02-18 RX ADMIN — KETOROLAC TROMETHAMINE 30 MG: 30 INJECTION, SOLUTION INTRAMUSCULAR; INTRAVENOUS at 08:10:00

## 2021-02-18 RX ADMIN — ONDANSETRON 4 MG: 2 INJECTION INTRAMUSCULAR; INTRAVENOUS at 07:30:00

## 2021-02-18 RX ADMIN — ROCURONIUM BROMIDE 5 MG: 10 INJECTION, SOLUTION INTRAVENOUS at 07:34:00

## 2021-02-18 RX ADMIN — GLYCOPYRROLATE 0.8 MG: 0.2 INJECTION, SOLUTION INTRAMUSCULAR; INTRAVENOUS at 08:00:00

## 2021-02-18 RX ADMIN — NEOSTIGMINE METHYLSULFATE 5 MG: 1 INJECTION INTRAVENOUS at 08:00:00

## 2021-02-18 NOTE — OPERATIVE REPORT
[facility]    PATIENT'S NAME: Katelynn George   ATTENDING PHYSICIAN: Inez Allan MD   OPERATING PHYSICIAN: Inez Allan MD   PATIENT ACCOUNT#:   [id number]    LOCATION:  [place] [address]  MEDICAL RECORD #:   [id number]       DA visualization with no evidence of trauma or bleeding noted. The patient was placed in Trendelenburg and the above-dictated findings were visualized. The right fallopian tube was identified and followed to its fimbriated end.   With a [facility]

## 2021-02-18 NOTE — ANESTHESIA PROCEDURE NOTES
Airway  Urgency: Elective    Airway not difficult    General Information and Staff    Patient location during procedure: OR  Resident/CRNA: Eloy Fang CRNA  Performed: CRNA     Indications and Patient Condition  Indications for airway management: anes

## 2021-02-18 NOTE — ANESTHESIA POSTPROCEDURE EVALUATION
Patient: Talmadge Homans    Procedure Summary     Date: 02/18/21 Room / Location: [address] [place] [address] MAIN OR 01 / [facility] MAIN OR    Anesthesia Start: 3180 Anesthesia Stop: 8195    Procedure: LAPAROSCOPIC SALPINGECTOMY (Bilateral Abdomen) Diagnosis:       Sterilization

## 2021-02-18 NOTE — BRIEF OP NOTE
[facility] POST ANESTHESIA CARE UNIT  Operative Note     Luisa Wells Location: OR   [facility] [id number] MRN [id number]   Admission Date 2/18/2021 Operation Date 2/18/2021   Attending Physician Alejandro Krause MD Operating Physician Sharron Bennett

## 2021-02-18 NOTE — ANESTHESIA PREPROCEDURE EVALUATION
Anesthesia PreOp Note    HPI:     Rui Ruelas is a 35year old female who presents for preoperative consultation requested by: Morgan Taylor MD    Date of Surgery: 2/18/2021    Procedure(s):  LAPAROSCOPIC SALPINGECTOMY  Indication: Sterilization [ taking: Reported on 1/12/2021 ), Disp: 1 each, Rfl: 0        •  lactated ringers infusion, , Intravenous, Continuous, Manoj Duran MD, Last Rate: 20 mL/hr at 02/18/21 0646    No current [facility]-ordered outpatient medications on file.       No Known Aller Emotionally abused: Not on file        Physically abused: Not on file        Forced sexual activity: Not on file    Other Topics      Concerns:         Service: Not Asked        Blood Transfusions: Not Asked        Caffeine Concern:  Yes (+) asthma,   Cardiovascular - negative ROS and normal exam    Neuro/Psych    (+)  depression,       GI/Hepatic/Renal - negative ROS     Endo/Other - negative ROS   Abdominal  - normal exam               Anesthesia Plan:   ASA:  2  Plan:   General  Infor

## 2021-02-18 NOTE — H&P
[facility], [facility]  Obstetrics and Gynecology  Focused Gynecology Problem Exam  Elke Stafford MD     Maribel Reece is a 35year old female presenting for Consult  .     HPI:   Patient presents with:  Consult     Patient is status post normal spontaneous v HEMORRHOIDECTOMY   07/23/2019     L anterior midline   • HEMORRHOIDECTOMY N/A 7/23/2019     Performed by Katya Toure MD at [facility] MAIN OR               Family History   Problem Relation Age of Onset   • Cancer Paternal Grandmother           stomach   • Gas activity: Not on file    Other Topics      Concerns:         Service: Not Asked        Blood Transfusions: Not Asked        Caffeine Concern: Yes          coffee,soda        Occupational Exposure: Not Asked        Hobby Hazards: Not Asked        Sl discussed regret rate based on age and that in general I do not recommend in patients under the age of 27. I discussed reversible types of long-term contraception including ParaGard IUD, progesterone containing IUDs and Nexplanon.   Patient understands all

## 2021-02-18 NOTE — INTERVAL H&P NOTE
Pre-op Diagnosis: Sterilization [Z30.2]    The above referenced H&P was reviewed by María Elena Carlson MD, MD on 2/18/2021, the patient was examined and no significant changes have occurred in the patient's condition since the H&P was performed.   I counsele

## 2021-02-19 ENCOUNTER — TELEPHONE (OUTPATIENT)
Dept: OBGYN CLINIC | Facility: CLINIC | Age: 31
End: 2021-02-19

## 2021-02-19 NOTE — TELEPHONE ENCOUNTER
Patient name and  verified. Patient scheduled for post op video visit with JJF. Informed patient waiting on JJF response for work note. Patient would like work note sent via [employer].

## 2021-02-19 NOTE — TELEPHONE ENCOUNTER
Pt needs to do  A 2 week post op with JF wants to know if she can do a vitual, and needs note for work with time off and restrictions

## 2021-02-19 NOTE — TELEPHONE ENCOUNTER
Please provide patient with a note for work. She may return to work as early as next Monday with no heavy lifting greater than 20 pounds. She may return to work with no restrictions in 2 weeks. Her note can be sent through [address]   Her postoperative vi

## 2021-02-22 NOTE — TELEPHONE ENCOUNTER
Pt still not feeling well, she is cramping and would like return to work date extended to Wednesday.  Please put in mychart

## 2021-02-22 NOTE — TELEPHONE ENCOUNTER
Gyne pt with hx of laparoscopic bilateral salpingectomy 02/18 requesting an extended return back to work note.   Had informed Dr. Beba Cummins she thought she would be ready by today, but is still having significant cramping and asked if she could have an extenti

## 2021-02-24 NOTE — TELEPHONE ENCOUNTER
Please provide patient with a note that allows her to be home through next Friday or whichever day prior that she prefers going back.

## 2022-06-10 NOTE — ANESTHESIA PREPROCEDURE EVALUATION
Anesthesia PreOp Note    HPI:     Gloria Roblero is a 35year old female who presents for preoperative consultation requested by: * No surgeons listed *    Date of Surgery: 11/30/2020    * No procedures listed *  Indication: * No pre-op diagnosis entered (BREAST PUMP) Does not apply Misc, DOUBLE ELECTRIC BREAST PUMP EQUIVALENT TO MEDELA PUMP IN STYLE (Patient not taking: Reported on 10/3/2020 ), Disp: 1 each, Rfl: 0        •  lactated ringers infusion, , Intravenous, Continuous, Rupal Singh MD, Last Marga Jameson MD, 10 mg at 11/30/20 2037    •  influenza vaccine split quad (FLULAVAL) ages 6 months to 64 years inj 0.5ml, 0.5 mL, Intramuscular, Prior to discharge, Sara Rodas MD    •  fentaNYL 2mcg/ml & bupivacaine 1.25mg/ml epidural infusion, , Epi Alcohol/week: 0.0 standard drinks      Drug use: No      Sexual activity: Yes        Birth control/protection: Condom    Lifestyle      Physical activity        Days per week: Not on file        Minutes per session: Not on file      Stress: Not on file height or weight on file to calculate BMI. oral temperature is 98.6 °F (37 °C). Her blood pressure is 104/50 and her pulse is 83.  Her respiration is 16.    11/30/20  1729 11/30/20 2027 11/30/20 2030 11/30/20  2140   BP: 115/63  128/74 104/50   Pulse: 7 Detail Level: Zone

## 2023-08-05 ENCOUNTER — OFFICE VISIT (OUTPATIENT)
Dept: FAMILY MEDICINE CLINIC | Facility: CLINIC | Age: 33
End: 2023-08-05

## 2023-08-05 ENCOUNTER — LAB ENCOUNTER (OUTPATIENT)
Dept: LAB | Age: 33
End: 2023-08-05
Payer: MEDICAID

## 2023-08-05 VITALS
DIASTOLIC BLOOD PRESSURE: 62 MMHG | WEIGHT: 181 LBS | SYSTOLIC BLOOD PRESSURE: 100 MMHG | BODY MASS INDEX: 32.07 KG/M2 | HEIGHT: 63 IN | TEMPERATURE: 98 F | OXYGEN SATURATION: 98 % | HEART RATE: 62 BPM | RESPIRATION RATE: 17 BRPM

## 2023-08-05 DIAGNOSIS — M25.50 ARTHRALGIA, UNSPECIFIED JOINT: ICD-10-CM

## 2023-08-05 DIAGNOSIS — E66.9 CLASS 1 OBESITY WITHOUT SERIOUS COMORBIDITY WITH BODY MASS INDEX (BMI) OF 32.0 TO 32.9 IN ADULT, UNSPECIFIED OBESITY TYPE: ICD-10-CM

## 2023-08-05 DIAGNOSIS — Z86.2 HISTORY OF ANEMIA: ICD-10-CM

## 2023-08-05 DIAGNOSIS — M25.50 ARTHRALGIA, UNSPECIFIED JOINT: Primary | ICD-10-CM

## 2023-08-05 DIAGNOSIS — M79.601 PARESTHESIA AND PAIN OF BOTH UPPER EXTREMITIES: ICD-10-CM

## 2023-08-05 DIAGNOSIS — M54.2 NECK PAIN: ICD-10-CM

## 2023-08-05 DIAGNOSIS — R20.2 PARESTHESIA AND PAIN OF BOTH UPPER EXTREMITIES: ICD-10-CM

## 2023-08-05 DIAGNOSIS — M79.602 PARESTHESIA AND PAIN OF BOTH UPPER EXTREMITIES: ICD-10-CM

## 2023-08-05 PROBLEM — M54.12 CERVICAL RADICULOPATHY: Status: ACTIVE | Noted: 2023-02-08

## 2023-08-05 LAB
ALBUMIN SERPL-MCNC: 3.8 G/DL (ref 3.4–5)
ALBUMIN/GLOB SERPL: 1.2 {RATIO} (ref 1–2)
ALP LIVER SERPL-CCNC: 71 U/L
ALT SERPL-CCNC: 34 U/L
ANION GAP SERPL CALC-SCNC: 7 MMOL/L (ref 0–18)
AST SERPL-CCNC: 19 U/L (ref 15–37)
BASOPHILS # BLD AUTO: 0.04 X10(3) UL (ref 0–0.2)
BASOPHILS NFR BLD AUTO: 0.6 %
BILIRUB SERPL-MCNC: 0.8 MG/DL (ref 0.1–2)
BUN BLD-MCNC: 12 MG/DL (ref 7–18)
BUN/CREAT SERPL: 16.2 (ref 10–20)
CALCIUM BLD-MCNC: 9.1 MG/DL (ref 8.5–10.1)
CHLORIDE SERPL-SCNC: 109 MMOL/L (ref 98–112)
CHOLEST SERPL-MCNC: 245 MG/DL (ref ?–200)
CO2 SERPL-SCNC: 24 MMOL/L (ref 21–32)
CREAT BLD-MCNC: 0.74 MG/DL
CRP SERPL-MCNC: <0.29 MG/DL (ref ?–0.3)
DEPRECATED RDW RBC AUTO: 38.5 FL (ref 35.1–46.3)
EGFRCR SERPLBLD CKD-EPI 2021: 111 ML/MIN/1.73M2 (ref 60–?)
EOSINOPHIL # BLD AUTO: 0.09 X10(3) UL (ref 0–0.7)
EOSINOPHIL NFR BLD AUTO: 1.3 %
ERYTHROCYTE [DISTWIDTH] IN BLOOD BY AUTOMATED COUNT: 12 % (ref 11–15)
FASTING PATIENT LIPID ANSWER: YES
FASTING STATUS PATIENT QL REPORTED: YES
GLOBULIN PLAS-MCNC: 3.3 G/DL (ref 2.8–4.4)
GLUCOSE BLD-MCNC: 92 MG/DL (ref 70–99)
HCT VFR BLD AUTO: 40.7 %
HDLC SERPL-MCNC: 42 MG/DL (ref 40–59)
HGB BLD-MCNC: 13.3 G/DL
IMM GRANULOCYTES # BLD AUTO: 0.01 X10(3) UL (ref 0–1)
IMM GRANULOCYTES NFR BLD: 0.1 %
LDLC SERPL CALC-MCNC: 170 MG/DL (ref ?–100)
LYMPHOCYTES # BLD AUTO: 2.88 X10(3) UL (ref 1–4)
LYMPHOCYTES NFR BLD AUTO: 41.5 %
MCH RBC QN AUTO: 28.7 PG (ref 26–34)
MCHC RBC AUTO-ENTMCNC: 32.7 G/DL (ref 31–37)
MCV RBC AUTO: 87.9 FL
MONOCYTES # BLD AUTO: 0.38 X10(3) UL (ref 0.1–1)
MONOCYTES NFR BLD AUTO: 5.5 %
NEUTROPHILS # BLD AUTO: 3.54 X10 (3) UL (ref 1.5–7.7)
NEUTROPHILS # BLD AUTO: 3.54 X10(3) UL (ref 1.5–7.7)
NEUTROPHILS NFR BLD AUTO: 51 %
NONHDLC SERPL-MCNC: 203 MG/DL (ref ?–130)
OSMOLALITY SERPL CALC.SUM OF ELEC: 289 MOSM/KG (ref 275–295)
PLATELET # BLD AUTO: 239 10(3)UL (ref 150–450)
POTASSIUM SERPL-SCNC: 3.9 MMOL/L (ref 3.5–5.1)
PROT SERPL-MCNC: 7.1 G/DL (ref 6.4–8.2)
RBC # BLD AUTO: 4.63 X10(6)UL
RHEUMATOID FACT SERPL-ACNC: <10 IU/ML (ref ?–15)
SODIUM SERPL-SCNC: 140 MMOL/L (ref 136–145)
TRIGL SERPL-MCNC: 176 MG/DL (ref 30–149)
VIT D+METAB SERPL-MCNC: 26.6 NG/ML (ref 30–100)
VLDLC SERPL CALC-MCNC: 35 MG/DL (ref 0–30)
WBC # BLD AUTO: 6.9 X10(3) UL (ref 4–11)

## 2023-08-05 PROCEDURE — 3008F BODY MASS INDEX DOCD: CPT

## 2023-08-05 PROCEDURE — 86038 ANTINUCLEAR ANTIBODIES: CPT

## 2023-08-05 PROCEDURE — 3074F SYST BP LT 130 MM HG: CPT

## 2023-08-05 PROCEDURE — 86431 RHEUMATOID FACTOR QUANT: CPT

## 2023-08-05 PROCEDURE — 86140 C-REACTIVE PROTEIN: CPT

## 2023-08-05 PROCEDURE — 80053 COMPREHEN METABOLIC PANEL: CPT

## 2023-08-05 PROCEDURE — 3078F DIAST BP <80 MM HG: CPT

## 2023-08-05 PROCEDURE — 86225 DNA ANTIBODY NATIVE: CPT

## 2023-08-05 PROCEDURE — 80061 LIPID PANEL: CPT

## 2023-08-05 PROCEDURE — 36415 COLL VENOUS BLD VENIPUNCTURE: CPT

## 2023-08-05 PROCEDURE — 99203 OFFICE O/P NEW LOW 30 MIN: CPT

## 2023-08-05 PROCEDURE — 82306 VITAMIN D 25 HYDROXY: CPT

## 2023-08-05 PROCEDURE — 85025 COMPLETE CBC W/AUTO DIFF WBC: CPT

## 2023-08-05 RX ORDER — ATORVASTATIN CALCIUM 20 MG/1
20 TABLET, FILM COATED ORAL AS DIRECTED
COMMUNITY
Start: 2022-09-30

## 2023-08-05 RX ORDER — ETODOLAC 200 MG/1
200 CAPSULE ORAL EVERY 8 HOURS PRN
Qty: 15 CAPSULE | Refills: 0 | Status: SHIPPED | OUTPATIENT
Start: 2023-08-05

## 2023-08-07 LAB
DSDNA IGG SERPL IA-ACNC: <0.6 IU/ML
ENA AB SER QL IA: <0.09 UG/L
ENA AB SER QL IA: NEGATIVE

## 2023-08-16 RX ORDER — ATORVASTATIN CALCIUM 20 MG/1
20 TABLET, FILM COATED ORAL AS DIRECTED
Refills: 0 | OUTPATIENT
Start: 2023-08-16

## 2023-08-16 RX ORDER — ETODOLAC 200 MG/1
CAPSULE ORAL
Qty: 15 CAPSULE | Refills: 0 | OUTPATIENT
Start: 2023-08-16

## 2023-11-28 ENCOUNTER — OFFICE VISIT (OUTPATIENT)
Dept: FAMILY MEDICINE CLINIC | Facility: CLINIC | Age: 33
End: 2023-11-28

## 2023-11-28 ENCOUNTER — NURSE TRIAGE (OUTPATIENT)
Dept: FAMILY MEDICINE CLINIC | Facility: CLINIC | Age: 33
End: 2023-11-28

## 2023-11-28 VITALS
BODY MASS INDEX: 32.78 KG/M2 | DIASTOLIC BLOOD PRESSURE: 74 MMHG | HEIGHT: 63 IN | WEIGHT: 185 LBS | SYSTOLIC BLOOD PRESSURE: 126 MMHG | HEART RATE: 65 BPM

## 2023-11-28 DIAGNOSIS — M54.2 NECK PAIN: ICD-10-CM

## 2023-11-28 DIAGNOSIS — R20.2 TINGLING OF BOTH UPPER EXTREMITIES: ICD-10-CM

## 2023-11-28 DIAGNOSIS — M54.12 CERVICAL RADICULOPATHY: Primary | ICD-10-CM

## 2023-11-28 PROCEDURE — 3078F DIAST BP <80 MM HG: CPT | Performed by: NURSE PRACTITIONER

## 2023-11-28 PROCEDURE — 3008F BODY MASS INDEX DOCD: CPT | Performed by: NURSE PRACTITIONER

## 2023-11-28 PROCEDURE — 3074F SYST BP LT 130 MM HG: CPT | Performed by: NURSE PRACTITIONER

## 2023-11-28 PROCEDURE — 99214 OFFICE O/P EST MOD 30 MIN: CPT | Performed by: NURSE PRACTITIONER

## 2023-11-28 RX ORDER — GABAPENTIN 100 MG/1
CAPSULE ORAL
Qty: 33 CAPSULE | Refills: 0 | Status: SHIPPED | OUTPATIENT
Start: 2023-11-28 | End: 2023-12-31

## 2023-11-28 NOTE — TELEPHONE ENCOUNTER
Please reply to pool: EM RN TRIAGE  Action Requested: Summary for Provider     []  Critical Lab, Recommendations Needed  [] Need Additional Advice  [x]   FYI    []   Need Orders  [] Need Medications Sent to Pharmacy  []  Other     SUMMARY: Patient contacts clinic reporting a pulling sensation to her left scalp,ear and tingling in her left arm. Symptoms began 1 week ago. Denies chest pain, shortness of breath, dizziness, lightheadedness, facial or extremity weakness or facial drooping. It feels like her hair is being pulled. Denies visual disturbance. Acute visit booked today, patient advised to monitor closely and report to IC or ER for any progression prior. She verbalized understanding and compliance.     Reason for call: Acute  Onset: Data Unavailable                       Reason for Disposition   Unexplained headache that is present > 24 hours    Protocols used: Headache-A-OH

## 2023-11-28 NOTE — ASSESSMENT & PLAN NOTE
Has MRI of neck that was done last year 11/2022-shows 2 herniated discs per pt but never followed up for tx. Encourage to bring in MRI disc to physiatry appt  Gabapentin 100 mg nightly-may increase to 200 mg if symptoms not improved after 3 nights. Refer physiatry for further evaluation   No heavy lifting. Please call if symptoms worsen or are not resolving.

## 2023-11-28 NOTE — ASSESSMENT & PLAN NOTE
Refer physiatry  Gabapentin 100 mg at hs-may increase to 200 mg if symptoms not improved. Please call if symptoms worsen or are not resolving.

## 2023-12-01 ENCOUNTER — OFFICE VISIT (OUTPATIENT)
Dept: OBGYN CLINIC | Facility: CLINIC | Age: 33
End: 2023-12-01
Payer: MEDICAID

## 2023-12-01 ENCOUNTER — HOSPITAL ENCOUNTER (OUTPATIENT)
Age: 33
Discharge: HOME | End: 2023-12-01
Attending: EMERGENCY MEDICINE
Payer: MEDICAID

## 2023-12-01 VITALS
OXYGEN SATURATION: 98 % | HEART RATE: 64 BPM | HEIGHT: 63 IN | SYSTOLIC BLOOD PRESSURE: 118 MMHG | BODY MASS INDEX: 31.89 KG/M2 | WEIGHT: 180 LBS | DIASTOLIC BLOOD PRESSURE: 67 MMHG | TEMPERATURE: 97 F | RESPIRATION RATE: 20 BRPM

## 2023-12-01 VITALS — SYSTOLIC BLOOD PRESSURE: 103 MMHG | DIASTOLIC BLOOD PRESSURE: 69 MMHG | BODY MASS INDEX: 33 KG/M2 | WEIGHT: 186.19 LBS

## 2023-12-01 DIAGNOSIS — H11.152 PINGUECULA OF LEFT EYE: ICD-10-CM

## 2023-12-01 DIAGNOSIS — M54.12 CERVICAL RADICULOPATHY: ICD-10-CM

## 2023-12-01 DIAGNOSIS — M54.81 OCCIPITAL NEURALGIA OF LEFT SIDE: Primary | ICD-10-CM

## 2023-12-01 DIAGNOSIS — Z01.419 WELL WOMAN EXAM: Primary | ICD-10-CM

## 2023-12-01 PROCEDURE — 87624 HPV HI-RISK TYP POOLED RSLT: CPT | Performed by: OBSTETRICS & GYNECOLOGY

## 2023-12-01 PROCEDURE — 99204 OFFICE O/P NEW MOD 45 MIN: CPT

## 2023-12-01 PROCEDURE — 99213 OFFICE O/P EST LOW 20 MIN: CPT

## 2023-12-01 RX ORDER — METHYLPREDNISOLONE 4 MG/1
TABLET ORAL
Qty: 1 EACH | Refills: 0 | Status: SHIPPED | OUTPATIENT
Start: 2023-12-01 | End: 2023-12-06

## 2023-12-01 NOTE — ED INITIAL ASSESSMENT (HPI)
Dr. Chuck Andujar at bedside assessing. Patient states she has had tingling/pulling sensation to the left head/face/neck area and occasionally has numbness/tingling down the left arm. States she has a history of of herniated discs to the neck. States she has an appointment on 12/14/23 for follow-up with a specialist, as she saw her PCP 3 days ago. States she woke up today with redness and irritation to the left eye.

## 2023-12-01 NOTE — DISCHARGE INSTRUCTIONS
Take Medrol Dosepak as directed. Continue your Neurontin. Increase your dose to 300 mg at night.   Follow-up with your primary care doctor and your physiatrist  Follow-up with [facility] for evaluation of your left eye discomfort

## 2023-12-04 LAB — HPV I/H RISK 1 DNA SPEC QL NAA+PROBE: NEGATIVE

## 2023-12-06 ENCOUNTER — LAB ENCOUNTER (OUTPATIENT)
Dept: LAB | Age: 33
End: 2023-12-06
Attending: NURSE PRACTITIONER
Payer: MEDICAID

## 2023-12-06 ENCOUNTER — OFFICE VISIT (OUTPATIENT)
Dept: FAMILY MEDICINE CLINIC | Facility: CLINIC | Age: 33
End: 2023-12-06
Payer: MEDICAID

## 2023-12-06 VITALS
HEART RATE: 71 BPM | DIASTOLIC BLOOD PRESSURE: 74 MMHG | SYSTOLIC BLOOD PRESSURE: 114 MMHG | WEIGHT: 185 LBS | BODY MASS INDEX: 33 KG/M2

## 2023-12-06 DIAGNOSIS — Z23 IMMUNIZATION DUE: ICD-10-CM

## 2023-12-06 DIAGNOSIS — E55.9 VITAMIN D DEFICIENCY: ICD-10-CM

## 2023-12-06 DIAGNOSIS — J45.21 MILD INTERMITTENT ASTHMA WITH ACUTE EXACERBATION: ICD-10-CM

## 2023-12-06 DIAGNOSIS — Z00.00 WELL ADULT EXAM: Primary | ICD-10-CM

## 2023-12-06 DIAGNOSIS — E66.9 OBESITY (BMI 30.0-34.9): ICD-10-CM

## 2023-12-06 DIAGNOSIS — E78.00 HYPERCHOLESTEREMIA: ICD-10-CM

## 2023-12-06 DIAGNOSIS — N64.4 BREAST PAIN: ICD-10-CM

## 2023-12-06 PROBLEM — E66.811 OBESITY (BMI 30.0-34.9): Status: ACTIVE | Noted: 2023-12-06

## 2023-12-06 LAB
CHOLEST SERPL-MCNC: 215 MG/DL (ref ?–200)
FASTING PATIENT LIPID ANSWER: NO
HDLC SERPL-MCNC: 39 MG/DL (ref 40–59)
LDLC SERPL CALC-MCNC: 145 MG/DL (ref ?–100)
NONHDLC SERPL-MCNC: 176 MG/DL (ref ?–130)
TRIGL SERPL-MCNC: 172 MG/DL (ref 30–149)
VIT D+METAB SERPL-MCNC: 18 NG/ML (ref 30–100)
VLDLC SERPL CALC-MCNC: 32 MG/DL (ref 0–30)

## 2023-12-06 PROCEDURE — 36415 COLL VENOUS BLD VENIPUNCTURE: CPT

## 2023-12-06 PROCEDURE — 3074F SYST BP LT 130 MM HG: CPT | Performed by: NURSE PRACTITIONER

## 2023-12-06 PROCEDURE — 90472 IMMUNIZATION ADMIN EACH ADD: CPT | Performed by: NURSE PRACTITIONER

## 2023-12-06 PROCEDURE — 82306 VITAMIN D 25 HYDROXY: CPT

## 2023-12-06 PROCEDURE — 99395 PREV VISIT EST AGE 18-39: CPT | Performed by: NURSE PRACTITIONER

## 2023-12-06 PROCEDURE — 3078F DIAST BP <80 MM HG: CPT | Performed by: NURSE PRACTITIONER

## 2023-12-06 PROCEDURE — 90677 PCV20 VACCINE IM: CPT | Performed by: NURSE PRACTITIONER

## 2023-12-06 PROCEDURE — 90471 IMMUNIZATION ADMIN: CPT | Performed by: NURSE PRACTITIONER

## 2023-12-06 PROCEDURE — 90686 IIV4 VACC NO PRSV 0.5 ML IM: CPT | Performed by: NURSE PRACTITIONER

## 2023-12-06 PROCEDURE — 80061 LIPID PANEL: CPT

## 2023-12-06 NOTE — ASSESSMENT & PLAN NOTE
Please aim to eat a diet high in fresh fruits and vegetables, lean protein sources, complex carbohydrates and limited processed and fast foods. Try to get at least 150 minutes of exercise per week-a combination of weight resistance and cardio is preferred. Discussed medications and limits due to insurance.

## 2023-12-06 NOTE — ASSESSMENT & PLAN NOTE
Recheck lipid panel  Please aim to eat a diet high in fresh fruits and vegetables, lean protein sources, complex carbohydrates and limited processed and fast foods. Try to get at least 150 minutes of exercise per week-a combination of weight resistance and cardio is preferred.

## 2023-12-06 NOTE — ASSESSMENT & PLAN NOTE
Reviewed labs; recheck lipid panel  Please aim to eat a diet high in fresh fruits and vegetables, lean protein sources, complex carbohydrates and limited processed and fast foods. Try to get at least 150 minutes of exercise per week-a combination of weight resistance and cardio is preferred.     Influenza and pneumovax  Encourage monthly self breast exam  Pap up to date

## 2023-12-30 ENCOUNTER — HOSPITAL ENCOUNTER (OUTPATIENT)
Dept: ULTRASOUND IMAGING | Age: 33
Discharge: HOME | End: 2023-12-30
Attending: OBSTETRICS & GYNECOLOGY
Payer: MEDICAID

## 2023-12-30 DIAGNOSIS — Z01.419 WELL WOMAN EXAM: ICD-10-CM

## 2023-12-30 PROCEDURE — 76830 TRANSVAGINAL US NON-OB: CPT | Performed by: OBSTETRICS & GYNECOLOGY

## 2023-12-30 PROCEDURE — 76856 US EXAM PELVIC COMPLETE: CPT | Performed by: OBSTETRICS & GYNECOLOGY

## 2024-01-18 ENCOUNTER — OFFICE VISIT (OUTPATIENT)
Dept: PHYSICAL MEDICINE AND REHAB | Facility: CLINIC | Age: 34
End: 2024-01-18
Payer: MEDICAID

## 2024-01-18 ENCOUNTER — TELEPHONE (OUTPATIENT)
Dept: OBGYN CLINIC | Facility: CLINIC | Age: 34
End: 2024-01-18

## 2024-01-18 VITALS
RESPIRATION RATE: 18 BRPM | OXYGEN SATURATION: 99 % | HEART RATE: 69 BPM | BODY MASS INDEX: 32.78 KG/M2 | WEIGHT: 185 LBS | HEIGHT: 63 IN

## 2024-01-18 DIAGNOSIS — M54.2 NECK PAIN OF OVER 3 MONTHS DURATION: Primary | ICD-10-CM

## 2024-01-18 DIAGNOSIS — N83.299 COMPLEX OVARIAN CYST: Primary | ICD-10-CM

## 2024-01-18 DIAGNOSIS — M79.602 PAIN IN BOTH UPPER EXTREMITIES: ICD-10-CM

## 2024-01-18 DIAGNOSIS — M79.601 PAIN IN BOTH UPPER EXTREMITIES: ICD-10-CM

## 2024-01-18 RX ORDER — CYCLOBENZAPRINE HCL 10 MG
10 TABLET ORAL NIGHTLY
Qty: 30 TABLET | Refills: 0 | Status: SHIPPED | OUTPATIENT
Start: 2024-01-18

## 2024-01-18 RX ORDER — GABAPENTIN 300 MG/1
600 CAPSULE ORAL NIGHTLY
Qty: 60 CAPSULE | Refills: 0 | Status: SHIPPED | OUTPATIENT
Start: 2024-01-18 | End: 2024-02-17

## 2024-01-18 NOTE — PROGRESS NOTES
NEW PATIENT VISIT    CHIEF COMPLAINT  Neck Pain    HISTORY OF PRESENTING ILLNESS  Anette Perez is a 33 year old female who presents for evaluation of neck pain with left upper extremity radicular pain. She has seen a provider in [place] where they did an MRI, and was told she has a pinched nerve. Pain is in both shoulder and bilateral arms to the hands. Pain is rated 4/10.     Pain started three years ago and started getting worse a year ago. She was in [place] about a year ago, where she had an MRI done. She has the report but no image disc, this is at home and she will work to bring it in. In brief the MRI C Spine report shows some degenerative changes in the mid part of the cervical spine with a paracentral left disc protrusion at C4-5 and some minimal central canal narrowing at C5-6.  No significant neuroforaminal compromise.    She did PT in [place] which did help a bit, but this pain came back. She was discharged back to [place]. She was referred for an injection, however this was not pursued as the patient moved back to the [facility].     She was seen in immediate care on 2023 where she was prescribed a Medrol Dosepak.  No imaging was obtained at that visit.  Her gabapentin dose was increased from 100 mg at night to 300 mg at night    She endorsed some improvement with the steroids, but again the pain returned shortly after. She tolerates the gabapentin at night without side effects.     PAST MEDICAL HISTORY  Past Medical History:   Diagnosis Date    Asthma     Decorative tattoo      (normal spontaneous vaginal delivery)      (normal spontaneous vaginal delivery)      (normal spontaneous vaginal delivery) 2020    Psoriasis     Visual impairment     glasses       PAST SURGICAL HISTORY  Past Surgical History:   Procedure Laterality Date    HEMORRHOIDECTOMY  2019    L anterior midline       MEDICATIONS  Current Outpatient Medications on File Prior to Visit    Medication Sig Dispense Refill    atorvastatin 20 MG Oral Tab Take 1 tablet (20 mg total) by mouth daily. 90 tablet 3    ibuprofen 600 MG Oral Tab Take 1 tablet (600 mg total) by mouth every 6 (six) hours as needed for Pain. 30 tablet 0     No current facility-administered medications on file prior to visit.       ALLERGIES  No Known Allergies    SOCIAL HISTORY   reports that she quit smoking about 7 years ago. Her smoking use included cigarettes. She has never used smokeless tobacco. She reports that she does not drink alcohol and does not use drugs.    FAMILY HISTORY  Family History   Problem Relation Age of Onset    Cancer Paternal Grandmother         stomach    Gastro-Intestinal Disorder Maternal Grandmother         bleeding ulcer (cause of death)    Asthma Maternal Grandmother     Genetic Disease Maternal Uncle         muscular dystrophy       REVIEW OF SYSTEMS  Complete review of systems was performed and was negative except for those items stated in the History of Presenting Illness and Past Medical/Surgical History.    PHYSICAL EXAMINATION  GENERAL:  In no acute distress. Well-developed and well nourished.   SKIN: No rashes or open wounds involving the upper extremities and neck.  NEUROLOGIC:   Strength: 5/5 throughout bilateral upper and lower extremities in all major muscle groups.   Sensation: intact light touch sensation throughout bilateral upper and lower extremities.   Reflexes: intact and symmetric in bilateral upper and lower extremities. Watson’s negative. Babinski downgoing bilaterally. No clonus.   Gait: able to heel walk, toe walk, and perform tandem gait.   MUSCULOSKELETAL:  Inspection: shoulders in protracted positions, lower cervical flexion, upper cervical extension posture. No scapular winging or muscular atrophy.  Palpation: tenderness was present over cervical paraspinals and trapezius bilaterally.  ROM:   Cervical: full in flexion and extension with exacerbation in axial neck pain  with both motions.    Shoulder: full in all planes and pain free.  Special Tests:   Spurling's: positive for pain traveling to lateral shoulder bilaterally   Lhermitte’s: negative    Hawkin’s / Neer’s: negative       REVIEW OF PRIOR X-RAYS/STUDIES  MRI Cervical spine dated 12/19/22 reveals, some narrowing at the C4/5 and C5/6 levels with minimal central canal narrowing. There is a paracentral left disc bulge at the C4/5 level.     IMPRESSION/DIAGNOSIS  Encounter Diagnoses   Name Primary?    Neck pain of over 3 months duration Yes    Pain in both upper extremities        TREATMENT/PLAN  Plan to restart physical therapy.  Additionally the patient has numbness and tingling sensations into the upper extremities without significant neuroforaminal compromise, will obtain EMG of bilateral upper extremities.  If consistent with cervical pathology would consider C7-T1 interlaminar epidural steroid injection.  Additionally have increased the patient's gabapentin to 600 mg at night as well as prescribed muscle laxer to address some of the myofascial pain that she feels on palpation.    Education was provided regarding the above impression/diagnosis and treatment options/plan were discussed.  All questions were answered during today's visit.  Patient will contact clinic if any other questions or concerns.          David Concepcion DO  Physical Medicine and Rehabilitation  Interventional Spine and Sports Medicine   [facility]

## 2024-01-25 ENCOUNTER — HOSPITAL ENCOUNTER (OUTPATIENT)
Dept: MAMMOGRAPHY | Facility: HOSPITAL | Age: 34
Discharge: HOME | End: 2024-01-25
Attending: NURSE PRACTITIONER
Payer: MEDICAID

## 2024-01-25 ENCOUNTER — HOSPITAL ENCOUNTER (OUTPATIENT)
Dept: ULTRASOUND IMAGING | Facility: HOSPITAL | Age: 34
Discharge: HOME | End: 2024-01-25
Attending: NURSE PRACTITIONER
Payer: MEDICAID

## 2024-01-25 DIAGNOSIS — N64.4 BREAST PAIN: ICD-10-CM

## 2024-01-25 PROCEDURE — 76642 ULTRASOUND BREAST LIMITED: CPT | Performed by: NURSE PRACTITIONER

## 2024-01-25 PROCEDURE — 77062 BREAST TOMOSYNTHESIS BI: CPT | Performed by: NURSE PRACTITIONER

## 2024-01-25 PROCEDURE — 77066 DX MAMMO INCL CAD BI: CPT | Performed by: NURSE PRACTITIONER

## 2024-02-15 ENCOUNTER — TELEPHONE (OUTPATIENT)
Dept: OBGYN CLINIC | Facility: CLINIC | Age: 34
End: 2024-02-15

## 2024-02-15 NOTE — TELEPHONE ENCOUNTER
Reschedule Endometrial biopsy procedure on 3/14 due to provider unavailable. Please reach out to Pt to reschedule.

## 2024-02-16 NOTE — TELEPHONE ENCOUNTER
Patient verified name and     Patient aware provider unavailable on 3/14. Appt r/s for 3/15. Aware of scheduling details.

## 2024-03-09 ENCOUNTER — HOSPITAL ENCOUNTER (OUTPATIENT)
Dept: ULTRASOUND IMAGING | Age: 34
Discharge: HOME | End: 2024-03-09
Attending: OBSTETRICS & GYNECOLOGY
Payer: MEDICAID

## 2024-03-09 ENCOUNTER — LAB ENCOUNTER (OUTPATIENT)
Dept: LAB | Age: 34
End: 2024-03-09
Attending: FAMILY MEDICINE
Payer: MEDICAID

## 2024-03-09 ENCOUNTER — OFFICE VISIT (OUTPATIENT)
Dept: FAMILY MEDICINE CLINIC | Facility: CLINIC | Age: 34
End: 2024-03-09

## 2024-03-09 VITALS
SYSTOLIC BLOOD PRESSURE: 103 MMHG | WEIGHT: 186 LBS | BODY MASS INDEX: 32.96 KG/M2 | DIASTOLIC BLOOD PRESSURE: 67 MMHG | HEIGHT: 63 IN | HEART RATE: 64 BPM

## 2024-03-09 DIAGNOSIS — E78.5 DYSLIPIDEMIA: ICD-10-CM

## 2024-03-09 DIAGNOSIS — K21.9 GASTROESOPHAGEAL REFLUX DISEASE, UNSPECIFIED WHETHER ESOPHAGITIS PRESENT: ICD-10-CM

## 2024-03-09 DIAGNOSIS — R06.83 SNORING: ICD-10-CM

## 2024-03-09 DIAGNOSIS — J34.3 HYPERTROPHY, NASAL, TURBINATE: ICD-10-CM

## 2024-03-09 DIAGNOSIS — R63.5 ABNORMAL WEIGHT GAIN: ICD-10-CM

## 2024-03-09 DIAGNOSIS — M89.8X6 BILATERAL TIBIAL PAIN: ICD-10-CM

## 2024-03-09 DIAGNOSIS — M25.561 ACUTE PAIN OF BOTH KNEES: Primary | ICD-10-CM

## 2024-03-09 DIAGNOSIS — N83.299 COMPLEX OVARIAN CYST: ICD-10-CM

## 2024-03-09 DIAGNOSIS — E55.9 VITAMIN D DEFICIENCY: ICD-10-CM

## 2024-03-09 DIAGNOSIS — M25.562 ACUTE PAIN OF BOTH KNEES: Primary | ICD-10-CM

## 2024-03-09 LAB
ALBUMIN SERPL-MCNC: 4.2 G/DL (ref 3.2–4.8)
ALBUMIN/GLOB SERPL: 1.7 {RATIO} (ref 1–2)
ALP LIVER SERPL-CCNC: 76 U/L
ALT SERPL-CCNC: 27 U/L
ANION GAP SERPL CALC-SCNC: 7 MMOL/L (ref 0–18)
AST SERPL-CCNC: 22 U/L (ref ?–34)
BILIRUB SERPL-MCNC: 0.4 MG/DL (ref 0.3–1.2)
BUN BLD-MCNC: 10 MG/DL (ref 9–23)
BUN/CREAT SERPL: 16.1 (ref 10–20)
CALCIUM BLD-MCNC: 9.6 MG/DL (ref 8.7–10.4)
CHLORIDE SERPL-SCNC: 109 MMOL/L (ref 98–112)
CHOLEST SERPL-MCNC: 160 MG/DL (ref ?–200)
CO2 SERPL-SCNC: 25 MMOL/L (ref 21–32)
CREAT BLD-MCNC: 0.62 MG/DL
EGFRCR SERPLBLD CKD-EPI 2021: 121 ML/MIN/1.73M2 (ref 60–?)
EST. AVERAGE GLUCOSE BLD GHB EST-MCNC: 105 MG/DL (ref 68–126)
FASTING PATIENT LIPID ANSWER: YES
FASTING STATUS PATIENT QL REPORTED: YES
GLOBULIN PLAS-MCNC: 2.5 G/DL (ref 2.8–4.4)
GLUCOSE BLD-MCNC: 87 MG/DL (ref 70–99)
HBA1C MFR BLD: 5.3 % (ref ?–5.7)
HDLC SERPL-MCNC: 41 MG/DL (ref 40–59)
LDLC SERPL CALC-MCNC: 101 MG/DL (ref ?–100)
NONHDLC SERPL-MCNC: 119 MG/DL (ref ?–130)
OSMOLALITY SERPL CALC.SUM OF ELEC: 290 MOSM/KG (ref 275–295)
POTASSIUM SERPL-SCNC: 4.5 MMOL/L (ref 3.5–5.1)
PROT SERPL-MCNC: 6.7 G/DL (ref 5.7–8.2)
SODIUM SERPL-SCNC: 141 MMOL/L (ref 136–145)
TRIGL SERPL-MCNC: 96 MG/DL (ref 30–149)
TSI SER-ACNC: 0.84 MIU/ML (ref 0.55–4.78)
VIT D+METAB SERPL-MCNC: 56.8 NG/ML (ref 30–100)
VLDLC SERPL CALC-MCNC: 16 MG/DL (ref 0–30)

## 2024-03-09 PROCEDURE — 76856 US EXAM PELVIC COMPLETE: CPT | Performed by: OBSTETRICS & GYNECOLOGY

## 2024-03-09 PROCEDURE — 83036 HEMOGLOBIN GLYCOSYLATED A1C: CPT

## 2024-03-09 PROCEDURE — 80053 COMPREHEN METABOLIC PANEL: CPT

## 2024-03-09 PROCEDURE — 84443 ASSAY THYROID STIM HORMONE: CPT

## 2024-03-09 PROCEDURE — 82306 VITAMIN D 25 HYDROXY: CPT

## 2024-03-09 PROCEDURE — 36415 COLL VENOUS BLD VENIPUNCTURE: CPT

## 2024-03-09 PROCEDURE — 80061 LIPID PANEL: CPT

## 2024-03-09 PROCEDURE — 76830 TRANSVAGINAL US NON-OB: CPT | Performed by: OBSTETRICS & GYNECOLOGY

## 2024-03-09 RX ORDER — OMEPRAZOLE 20 MG/1
20 CAPSULE, DELAYED RELEASE ORAL
Qty: 30 CAPSULE | Refills: 3 | Status: SHIPPED | OUTPATIENT
Start: 2024-03-09

## 2024-03-09 NOTE — PROGRESS NOTES
Anette Perze is a 33 year old female.    HPI:   Patient is here with multiple concerns, patient reports that she has been experiencing pain in knees and tibial tuberosity, pain from right leg will radiate downward all the way to her ankle, she reports that this pain has been chronic but has become more bothersome to the point that she is not able to do any high-impact activity without severe pain.    Patient also reports that she has been having worsening of snoring, to the point that her daughter that lives in a different floor will, close the door as they can hear the snoring through the house, she reports that she has not restorative sleep as well.  Patient also has noted weight gain over the last few years, she had weight gain after her last pregnancy, was able to lose the weight and then progressively has continued to have weight regain.  Patient also explains that she has been experiencing significant reflux to the point that she has sour mouth in the mornings  Current Outpatient Medications   Medication Sig Dispense Refill    omeprazole 20 MG Oral Capsule Delayed Release Take 1 capsule (20 mg total) by mouth every morning before breakfast. 30 capsule 3    cyclobenzaprine 10 MG Oral Tab Take 1 tablet (10 mg total) by mouth nightly. 30 tablet 0    atorvastatin 20 MG Oral Tab Take 1 tablet (20 mg total) by mouth daily. 90 tablet 3    ibuprofen 600 MG Oral Tab Take 1 tablet (600 mg total) by mouth every 6 (six) hours as needed for Pain. 30 tablet 0      Past Medical History:   Diagnosis Date    Asthma ([facility])     Decorative tattoo      (normal spontaneous vaginal delivery) ([facility])      (normal spontaneous vaginal delivery) ([facility])      (normal spontaneous vaginal delivery) ([facility]) 2020    Psoriasis     Visual impairment     glasses      Social History:  Social History     Socioeconomic History    Marital status:     Number of children: 3   Occupational History    Occupation:  [employer]     Employer: [employer]    Tobacco Use    Smoking status: Former     Types: Cigarettes     Quit date: 2016     Years since quittin.8    Smokeless tobacco: Never   Vaping Use    Vaping Use: Never used   Substance and Sexual Activity    Alcohol use: No     Alcohol/week: 0.0 standard drinks of alcohol    Drug use: No    Sexual activity: Yes     Birth control/protection: Condom   Other Topics Concern    Caffeine Concern Yes     Comment: coffee,soda    Pt has a pacemaker No    Pt has a defibrillator No    Reaction to local anesthetic No          EXAM:   /67   Pulse 64   Ht 5' 3\" (1.6 m)   Wt 186 lb (84.4 kg)   LMP 2024 (Within Days)   BMI 32.95 kg/m²     Physical Exam  Vitals and nursing note reviewed.   Constitutional:       General: She is not in acute distress.  HENT:      Nose:      Right Turbinates: Enlarged and swollen.      Left Turbinates: Enlarged and swollen.   Pulmonary:      Effort: Pulmonary effort is normal.   Musculoskeletal:      Right knee: Crepitus present. Tenderness present.      Left knee: Tenderness present.   Neurological:      Mental Status: She is alert and oriented to person, place, and time.   Psychiatric:         Mood and Affect: Mood normal.         Behavior: Behavior normal.            ASSESSMENT AND PLAN:   1. Acute pain of both knees  Patient presenting with possible underlying osteoarthritis but significant pain for her age, may have underlying component due to her weight, will complete imaging as noted below  - XR KNEE (3 VIEWS) AP LAT OBL BILAT EM (CPT=73562-50); Future    2. Bilateral tibial pain    - XR TIBIA + FIBULA (2 VIEWS), RIGHT (CPT=73590); Future  - XR TIBIA + FIBULA (2 VIEWS), LEFT (CPT=73590); Future    3. Snoring  Suspect possible stated to sleep apnea, will complete testing as noted but also noted that patient has enlargement in turbinates which could be an aggravating factor, refer to ENT  - DIAGOSTIC SLEEP STUDY  - General  sleep study; Future    4. Abnormal weight gain    - TSH W Reflex To Free T4 [E]; Future  - Hemoglobin A1C; Future    5. Dyslipidemia  Follow-up labs ordered  - Comp Metabolic Panel (14) [E]; Future  - Lipid Panel [E]; Future    6. Hypertrophy, nasal, turbinate  See above  - ENT Referral - In Network    7. Gastroesophageal reflux disease, unspecified whether esophagitis present    - omeprazole 20 MG Oral Capsule Delayed Release; Take 1 capsule (20 mg total) by mouth every morning before breakfast.  Dispense: 30 capsule; Refill: 3       The patient indicates understanding of these issues and agrees to the plan.      The above note was creating using Dragon speech recognition technology. Please excuse any typos.

## 2024-03-15 ENCOUNTER — OFFICE VISIT (OUTPATIENT)
Dept: OBGYN CLINIC | Facility: CLINIC | Age: 34
End: 2024-03-15
Payer: MEDICAID

## 2024-03-15 VITALS
WEIGHT: 185 LBS | BODY MASS INDEX: 32.78 KG/M2 | SYSTOLIC BLOOD PRESSURE: 116 MMHG | HEIGHT: 63 IN | DIASTOLIC BLOOD PRESSURE: 77 MMHG | HEART RATE: 69 BPM

## 2024-03-15 DIAGNOSIS — N83.299 COMPLEX OVARIAN CYST: Primary | ICD-10-CM

## 2024-03-15 DIAGNOSIS — R93.89 THICKENED ENDOMETRIUM: ICD-10-CM

## 2024-03-15 DIAGNOSIS — Z01.818 PREPROCEDURAL EXAMINATION: ICD-10-CM

## 2024-03-15 LAB
KIT LOT #: NORMAL NUMERIC
PREGNANCY TEST, URINE: NEGATIVE

## 2024-03-15 PROCEDURE — 99213 OFFICE O/P EST LOW 20 MIN: CPT | Performed by: OBSTETRICS & GYNECOLOGY

## 2024-03-15 PROCEDURE — 58100 BIOPSY OF UTERUS LINING: CPT | Performed by: OBSTETRICS & GYNECOLOGY

## 2024-03-15 PROCEDURE — 81025 URINE PREGNANCY TEST: CPT | Performed by: OBSTETRICS & GYNECOLOGY

## 2024-03-15 NOTE — PROGRESS NOTES
HPI:   Anette Perez is a 33 year old female who presents for a hx of persistent ovarian cyst,  on review of ultrasound possible endometrioma.   Pelvic MRI ordered per radiology.   Pt with thickened endometrium, counseled, pt requested endometrial biopsy today.     Wt Readings from Last 6 Encounters:   03/15/24 185 lb (83.9 kg)   24 186 lb (84.4 kg)   24 185 lb (83.9 kg)   23 185 lb (83.9 kg)   23 180 lb (81.6 kg)   23 186 lb 3.2 oz (84.5 kg)     Body mass index is 32.77 kg/m².     Cholesterol, Total (mg/dL)   Date Value   2024 160   2023 215 (H)   2023 245 (H)     HDL Cholesterol (mg/dL)   Date Value   2024 41   2023 39 (L)   2023 42     LDL Cholesterol (mg/dL)   Date Value   2024 101 (H)   2023 145 (H)   2023 170 (H)     AST (U/L)   Date Value   2024 22   2023 19     ALT (U/L)   Date Value   2024 27   2023 34        Current Outpatient Medications   Medication Sig Dispense Refill    omeprazole 20 MG Oral Capsule Delayed Release Take 1 capsule (20 mg total) by mouth every morning before breakfast. 30 capsule 3    cyclobenzaprine 10 MG Oral Tab Take 1 tablet (10 mg total) by mouth nightly. 30 tablet 0    atorvastatin 20 MG Oral Tab Take 1 tablet (20 mg total) by mouth daily. 90 tablet 3    ibuprofen 600 MG Oral Tab Take 1 tablet (600 mg total) by mouth every 6 (six) hours as needed for Pain. 30 tablet 0      Past Medical History:   Diagnosis Date    Asthma ([facility])     Decorative tattoo      (normal spontaneous vaginal delivery) ([facility])      (normal spontaneous vaginal delivery) ([facility])      (normal spontaneous vaginal delivery) ([facility]) 2020    Psoriasis     Visual impairment     glasses      Past Surgical History:   Procedure Laterality Date    HEMORRHOIDECTOMY  2019    L anterior midline    TUBAL LIGATION        Family History   Problem Relation Age of Onset    Cancer  Paternal Grandmother         stomach    Gastro-Intestinal Disorder Maternal Grandmother         bleeding ulcer (cause of death)    Asthma Maternal Grandmother     Genetic Disease Maternal Uncle         muscular dystrophy      Social History:   Social History     Socioeconomic History    Marital status:     Number of children: 3   Occupational History    Occupation: [employer]     Employer: [employer]    Tobacco Use    Smoking status: Former     Types: Cigarettes     Quit date: 2016     Years since quittin.8    Smokeless tobacco: Never   Vaping Use    Vaping Use: Never used   Substance and Sexual Activity    Alcohol use: No     Alcohol/week: 0.0 standard drinks of alcohol    Drug use: No    Sexual activity: Yes     Birth control/protection: Condom   Other Topics Concern    Caffeine Concern Yes     Comment: coffee,soda    Pt has a pacemaker No    Pt has a defibrillator No    Reaction to local anesthetic No            REVIEW OF SYSTEMS:   GENERAL: feels well otherwise  SKIN: denies any unusual skin lesions  EYES:denies blurred vision or double vision  HEENT: denies nasal congestion, sinus pain or ST  LUNGS: denies shortness of breath with exertion  CARDIOVASCULAR: denies chest pain on exertion  GI: denies abdominal pain,denies heartburn  : denies dysuria, vaginal discharge or itching,periods regular   MUSCULOSKELETAL: denies back pain  NEURO: denies headaches  PSYCHE: denies depression or anxiety  HEMATOLOGIC: denies hx of anemia  ENDOCRINE: denies thyroid history  ALL/ASTHMA: denies hx of allergy or asthma    EXAM:   /77 (BP Location: Right arm, Patient Position: Sitting, Cuff Size: adult)   Pulse 69   Ht 5' 3\" (1.6 m)   Wt 185 lb (83.9 kg)   LMP 2024 (Within Days)   BMI 32.77 kg/m²   Body mass index is 32.77 kg/m².   GENERAL: well developed, well nourished,in no apparent distress  SKIN: no rashes,no suspicious lesions  HEENT: atraumatic, normocephalic  EYES:normal in  appearance  NECK: supple,no adenopathy  CHEST: no chest tenderness  LUNGS: clear to auscultation  CARDIO: RRR without murmur  GI: good BS's,no masses, HSM or tenderness  :introitus is normal,scant discharge,cervix is pink,no adnexal masses or tenderness,    Endometrial Biopsy     Pre-Procedure Care:   Consent was obtained.  Procedure/risks were explained.  Questions were answered.  Correct patient was identified.  Correct side and site were confirmed.    Pregnancy Results: negative from urine test   Birth control method(s) used:      Indication:  thickened endometrium    Pre-Medications:    The patient was premedicated with Acetaminophen.    Description of Procedure:   A bivalve speculum was placed in the vagina and the cervix was prepped with betadine solution.   Single tooth tenaculum placed at the 12 o'clock position.   The uterine cavity was sounded at 6 cm.   The endometrial cavity was curetted for pipelle tissue sampling with 3 passes.  Specimen was sent to pathology.   The single tooth tenaculum was removed.   Silver nitrate was applied at the site of tenaculum application   Good hemostasis was noted.  There were no complications.    There was no blood loss.      Discharge instructions were provided to the patient.    Visit Plan:  Await biopsy results.         MUSCULOSKELETAL: back is not tender,FROM of the back  EXTREMITIES: no cyanosis, clubbing or edema  NEURO: Oriented times three    Narrative   PROCEDURE:  US PELVIS (TRANSABDOMINAL AND TRANSVAGINAL) (CPT=76856/71990)     COMPARISON:  US NEO, US PELVIS W EV (CPT=76856/72604), 12/30/2023, 10:29 AM.     INDICATIONS:  N83.299 Complex ovarian cyst     TECHNIQUE:  Pelvic ultrasound using transabdominal and endovaginal technique.  Transvaginal ultrasound was used to better evaluate adnexal and endometrial detail.     PATIENT STATED HISTORY: (As transcribed by Technologist)  Patient stated right pelvic pain, bloating, history of ovarian cyst.          FINDINGS:                UTERUS:  10.13 cm x 4.31 cm x 5.32 cm    Endometrium Thickness: 2.00 cm, 2.06 cm    Anteverted uterus without fibroids.  Homogeneous thickening of the endometrium, likely reflecting secretory phase of menstrual cycle.  Few cervical nabothian cysts noted.  Few nonspecific myometrial calcifications within the body segment.  RIGHT OVARY:  3.03 cm x 1.85 cm x 3.21 cm    The right ovary appears normal in size, shape, and echogenicity.  Multiple ovarian follicles noted.    LEFT OVARY:  4.07 cm x 3.75 cm x 4.14 cm    The left ovary appears normal in size, shape, and echogenicity.  Multiple ovarian follicles noted in addition to complex intra-ovarian structure measuring 2.7 x 2.7 x 3.1 cm.  It demonstrates homogeneous low level internal echoes without demonstrable  internal vascularity.  This was present on 12/30/2023 measuring 2.5 x 2.0 x 3.0 cm.  Given its persistence and general similar in size, an endometrioma suspected, less likely hemorrhagic cyst.    CUL-DE-SAC:  No adnexal mass or free pelvic fluid.  OTHER:  Negative.                        Impression   CONCLUSION:       1. Anteverted uterus without fibroids.     2. Homogeneous thickening of the endometrium, likely reflecting secretory phase of menstrual cycle.     3. Complex left intra-ovarian structure demonstrating homogeneous low-level internal echoes, similar in size compared to 12/30/2023, its persistence favoring an endometrioma rather than hemorrhagic cyst.  This could be confirmed with pelvic MRI, as  clinically warranted.          LOCATION:  [id number]        Dictated by (CST): Rebekah Wolfe MD on 3/09/2024 at 10:53 PM      Finalized by (CST): Rebekah Wolfe MD on 3/09/2024 at 10:56 PM       Narrative   PROCEDURE:  US PELVIS W EV (CPT=76856,35282)     COMPARISON:  None.     INDICATIONS:  Z01.419 Well woman exam     TECHNIQUE:  Pelvic ultrasound using transabdominal and endovaginal technique.  Transvaginal ultrasound was used to better  evaluate adnexal and endometrial detail.     PATIENT STATED HISTORY: (As transcribed by Technologist)  Patient denies pelvic pain.         FINDINGS:                UTERUS:  8.9 x 4.7 x 5.5    Endometrium Thickness:  20 mm    The uterus is within normal limits.  There is endometrial thickening to 20 mm.  There are multiple small cystic and echogenic structures within the endometrium.  RIGHT OVARY:  2.6 x 1.8 x 1.7 cm    The right ovary appears normal in size, shape, and echogenicity. No significant masses are identified.  LEFT OVARY:  2.9 x 2.4 x 2.9 cm    There is a 2.2 x 2.0 x 2.1 cm complex cystic structure within the left ovary containing nearly homogeneous internal echoes.  CUL-DE-SAC:  Normal.  No fluid or mass.    OTHER:  Negative.                        Impression   CONCLUSION:    1. Endometrial thickening with several small cyst and echogenic foci, findings are indeterminate, though may be related to cystic endometrial hyperplasia.  2. Complex left ovarian cystic structure, which may be related to proteinaceous/hemorrhagic cyst versus endometrioma.  Contrast enhanced MRI of the pelvis is recommended for better characterization of the above findings.        LOCATION:  [facility]        Dictated by (CST): Rahul Oshea MD on 12/30/2023 at 3:29 PM      Finalized by (CST): Rahul Oshea MD on 12/30/2023 at 3:37 PM     Collected 3/15/2024 10:48 AM       Status: Final result       Dx: Preprocedural examination    0 Result Notes      Component  Ref Range & Units 3/15/24 10:48 AM   Pregnancy Test, Urine Negative   Control Line Present with a clear background (yes/no)  Yes/No Y   Kit Lot #  Numeric [id number]   Kit Expiration Date  Date 4/8/2025              Specimen Collected: 03/15/24 10:48 AM Last Resulted: 03/15/24 10:48 AM           ASSESSMENT AND PLAN:   Anette Perez is a 33 year old female who presents for a  hx of persistent ovarian cyst,  on review of ultrasound possible endometrioma.   Pelvic MRI  ordered per radiology.   Pt with thickened endometrium, counseled, pt requested endometrial biopsy today.   The patient is asked to return for an annual visit.

## 2024-03-23 ENCOUNTER — HOSPITAL ENCOUNTER (OUTPATIENT)
Dept: GENERAL RADIOLOGY | Age: 34
Discharge: HOME | End: 2024-03-23
Attending: FAMILY MEDICINE
Payer: MEDICAID

## 2024-03-23 DIAGNOSIS — M89.8X6 BILATERAL TIBIAL PAIN: ICD-10-CM

## 2024-03-23 PROCEDURE — 73590 X-RAY EXAM OF LOWER LEG: CPT | Performed by: FAMILY MEDICINE

## 2024-03-30 NOTE — TELEPHONE ENCOUNTER
----- Message from Chioma Garces MD sent at 10/6/2020  8:31 AM CDT -----  Result noted. Please have the lab do sensitivities for the Candida albicans. Please notify patient of BV result and positive yeast/Candida.   She was treated already with Difluc
10/07/2020 Pt was informed about her Vaginal Culture results. Pt verbalized understanding, pt did not have any further questions. Spoke to Ronda quinonez from the lab. Sensitivities for the Candida Albicans will be run.          MCKENNA Mata
<-- Click to add NO significant Past Surgical History

## 2024-04-25 ENCOUNTER — TELEPHONE (OUTPATIENT)
Dept: OBGYN CLINIC | Facility: CLINIC | Age: 34
End: 2024-04-25

## 2024-05-18 ENCOUNTER — HOSPITAL ENCOUNTER (OUTPATIENT)
Age: 34
Discharge: HOME | End: 2024-05-18
Attending: EMERGENCY MEDICINE

## 2024-05-18 VITALS
BODY MASS INDEX: 31.89 KG/M2 | RESPIRATION RATE: 18 BRPM | SYSTOLIC BLOOD PRESSURE: 118 MMHG | WEIGHT: 180 LBS | OXYGEN SATURATION: 99 % | DIASTOLIC BLOOD PRESSURE: 72 MMHG | TEMPERATURE: 99 F | HEIGHT: 63 IN | HEART RATE: 91 BPM

## 2024-05-18 DIAGNOSIS — J02.0 STREPTOCOCCAL SORE THROAT: Primary | ICD-10-CM

## 2024-05-18 LAB
S PYO AG THROAT QL IA.RAPID: POSITIVE
SARS-COV-2 RNA RESP QL NAA+PROBE: NOT DETECTED

## 2024-05-18 PROCEDURE — 99213 OFFICE O/P EST LOW 20 MIN: CPT

## 2024-05-18 PROCEDURE — 87651 STREP A DNA AMP PROBE: CPT | Performed by: EMERGENCY MEDICINE

## 2024-05-18 RX ORDER — AMOXICILLIN 875 MG/1
875 TABLET, COATED ORAL 2 TIMES DAILY
Qty: 20 TABLET | Refills: 0 | Status: SHIPPED | OUTPATIENT
Start: 2024-05-18 | End: 2024-05-28

## 2024-05-18 NOTE — ED PROVIDER NOTES
Patient Seen in: [facility] [place]      History     Chief Complaint   Patient presents with    Sore Throat     Stated Complaint: Sore throat and body ache    Subjective:   HPI    Patient is a 33-year-old female presents the immediate care with a history of sore throat, sinus congestion and painful swallowing which has been ongoing since Wednesday, 3 days prior to arrival in the immediate care.  Patient has had no history of any ill contacts at home.  The patient denies history of any productive cough.  Patient denies history of any recent antibiotic use.  The patient denies history of any other somatic complaints or discomfort at this time.  Patient has discomfort with swallowing at this time.  The patient denies history of any other somatic complaints or discomfort at this time.    Objective:   Past Medical History:    Asthma ([facility])    Decorative tattoo    Esophageal reflux    Hyperlipidemia     (normal spontaneous vaginal delivery) ([facility])     (normal spontaneous vaginal delivery) ([facility])     (normal spontaneous vaginal delivery) ([facility])    Psoriasis    Visual impairment    glasses              Past Surgical History:   Procedure Laterality Date    Hemorrhoidectomy  2019    L anterior midline    Tubal ligation                  Social History     Socioeconomic History    Marital status:     Number of children: 3   Occupational History    Occupation: [employer]     Employer: [employer]    Tobacco Use    Smoking status: Former     Current packs/day: 0.00     Types: Cigarettes     Quit date: 2016     Years since quittin.0    Smokeless tobacco: Never   Vaping Use    Vaping status: Never Used   Substance and Sexual Activity    Alcohol use: No     Alcohol/week: 0.0 standard drinks of alcohol    Drug use: No    Sexual activity: Yes     Birth control/protection: Condom   Other Topics Concern    Caffeine Concern Yes     Comment: coffee,soda    Pt has a pacemaker No    Pt has a  defibrillator No    Reaction to local anesthetic No     Social Determinants of Health     Financial Resource Strain: Not on File (2023)    Received from [facility]     Financial Resource Strain     Financial Resource Strain: 0   Food Insecurity: Not on File (2023)    Received from [facility]     Food Insecurity     Food: 0   Transportation Needs: Not on File (2023)    Received from [facility]     Transportation Needs     Transportation: 0   Physical Activity: Not on File (2023)    Received from [facility]     Physical Activity     Physical Activity: 0   Stress: Not on File (2023)    Received from [facility]     Stress     Stress: 0   Social Connections: Not on File (2023)    Received from [facility]     Social Connections     Social Connections and Isolation: 0   Housing Stability: Not on File (2023)    Received from [facility]     Housing Stability     Housin              Review of Systems    Positive for stated complaint: Sore throat and body ache  Other systems are as noted in HPI.  Constitutional and vital signs reviewed.      All other systems reviewed and negative except as noted above.    Physical Exam     ED Triage Vitals [24 0946]   /72   Pulse 91   Resp 18   Temp 98.8 °F (37.1 °C)   Temp src Temporal   SpO2 99 %   O2 Device None (Room air)       Current Vitals:   Vital Signs  BP: 118/72  Pulse: 91  Resp: 18  Temp: 98.8 °F (37.1 °C)  Temp src: Temporal    Oxygen Therapy  SpO2: 99 %  O2 Device: None (Room air)            Physical Exam    GENERAL: Well-developed, well-nourished female sitting up breathing easily in no apparent distress.  Patient is nontoxic in appearance.  HEENT: Head is normocephalic, atraumatic. Pupils are 4 mm equally round and reactive to light. Oropharynx shows evidence of tonsillar hypertrophy with exudate bilaterally.  There is no abscess appreciated.  The patient's uvula is midline.  There is tenderness percussion  over both maxillary sinuses bilaterally. Mucous membranes are moist.  NECK:  No stridor.  LUNGS: Clear to auscultation bilaterally with no wheeze. There is good equal air entry bilaterally.  HEART: Regular rate and rhythm. Normal S1, S2 no S3, or S4. No murmur.    NEURO: Patient is awake, alert and oriented to time place and person. Motor strength is 5 over 5 in all 4 extremities. There are no gross motor or sensory deficits appreciated.  Patient answering all questions appropriately.      ED Course     Labs Reviewed   RAPID STREP A - Abnormal; Notable for the following components:       Result Value    Strep A by PCR Positive (*)     All other components within normal limits   RAPID SARS-COV-2 BY PCR - Normal                      MDM     Patient strep test found to be positive.  The patient's COVID test found to be negative.  The patient is sitting back and breathing easily in no apparent distress at this time.  Patient has evidence of strep throat will be treated with amoxicillin for symptoms at home instructed to encourage fluids and rest at home to take ibuprofen for pain at home to return to the ER immediately if symptoms worsen or if any other problems arise.  Patient discharged home at this time.                                   Medical Decision Making      Disposition and Plan     Clinical Impression:  1. Streptococcal sore throat         Disposition:  Discharge  5/18/2024 10:17 am    Follow-up:  Chava Werner, DO  [address] [place] [address]  [phone]    In 2 days            Medications Prescribed:  Current Discharge Medication List        START taking these medications    Details   amoxicillin 875 MG Oral Tab Take 1 tablet (875 mg total) by mouth 2 (two) times daily for 10 days.  Qty: 20 tablet, Refills: 0

## 2024-05-18 NOTE — DISCHARGE INSTRUCTIONS
Encourage fluids at home  Motrin for pain at home  Return to the ER if symptoms worsen or if any other problems arise

## 2024-07-25 ENCOUNTER — OFFICE VISIT (OUTPATIENT)
Dept: DERMATOLOGY CLINIC | Facility: CLINIC | Age: 34
End: 2024-07-25

## 2024-07-25 ENCOUNTER — LAB ENCOUNTER (OUTPATIENT)
Dept: LAB | Age: 34
End: 2024-07-25
Attending: DERMATOLOGY
Payer: MEDICAID

## 2024-07-25 ENCOUNTER — TELEPHONE (OUTPATIENT)
Dept: DERMATOLOGY CLINIC | Facility: CLINIC | Age: 34
End: 2024-07-25

## 2024-07-25 DIAGNOSIS — L70.0 ACNE VULGARIS: ICD-10-CM

## 2024-07-25 DIAGNOSIS — Z51.81 MEDICATION MONITORING ENCOUNTER: ICD-10-CM

## 2024-07-25 DIAGNOSIS — K21.9 GASTROESOPHAGEAL REFLUX DISEASE, UNSPECIFIED WHETHER ESOPHAGITIS PRESENT: ICD-10-CM

## 2024-07-25 DIAGNOSIS — L40.0 PSORIASIS VULGARIS: Primary | ICD-10-CM

## 2024-07-25 DIAGNOSIS — L40.0 PSORIASIS VULGARIS: ICD-10-CM

## 2024-07-25 DIAGNOSIS — L29.9 PRURITUS: ICD-10-CM

## 2024-07-25 LAB
HAV AB SER QL IA: REACTIVE
HBV CORE AB SERPL QL IA: NONREACTIVE
HBV SURFACE AB SER QL: NONREACTIVE
HBV SURFACE AB SERPL IA-ACNC: <3.1 MIU/ML
HBV SURFACE AG SERPL QL IA: NONREACTIVE
HCV AB SERPL QL IA: NONREACTIVE

## 2024-07-25 PROCEDURE — 86706 HEP B SURFACE ANTIBODY: CPT

## 2024-07-25 PROCEDURE — 99204 OFFICE O/P NEW MOD 45 MIN: CPT | Performed by: DERMATOLOGY

## 2024-07-25 PROCEDURE — 87340 HEPATITIS B SURFACE AG IA: CPT

## 2024-07-25 PROCEDURE — 86704 HEP B CORE ANTIBODY TOTAL: CPT

## 2024-07-25 PROCEDURE — 86708 HEPATITIS A ANTIBODY: CPT

## 2024-07-25 PROCEDURE — 86480 TB TEST CELL IMMUN MEASURE: CPT

## 2024-07-25 PROCEDURE — 80503 PATH CLIN CONSLTJ SF 5-20: CPT

## 2024-07-25 PROCEDURE — 36415 COLL VENOUS BLD VENIPUNCTURE: CPT

## 2024-07-25 PROCEDURE — 86709 HEPATITIS A IGM ANTIBODY: CPT

## 2024-07-25 PROCEDURE — 86803 HEPATITIS C AB TEST: CPT

## 2024-07-25 RX ORDER — FLUOCINOLONE ACETONIDE 0.11 MG/ML
OIL TOPICAL
Qty: 118 ML | Refills: 12 | Status: SHIPPED | OUTPATIENT
Start: 2024-07-25

## 2024-07-25 RX ORDER — OMEPRAZOLE 20 MG/1
20 CAPSULE, DELAYED RELEASE ORAL
Qty: 30 CAPSULE | Refills: 3 | OUTPATIENT
Start: 2024-07-25

## 2024-07-25 RX ORDER — ADALIMUMAB 4 MG/ML
KIT INJECTION
Qty: 1 EACH | Refills: 0 | Status: SHIPPED | OUTPATIENT
Start: 2024-07-25

## 2024-07-25 RX ORDER — ADALIMUMAB 40MG/0.4ML
40 KIT SUBCUTANEOUS
Qty: 2 EACH | Refills: 5 | Status: SHIPPED | OUTPATIENT
Start: 2024-07-25

## 2024-07-25 RX ORDER — CLINDAMYCIN PHOSPHATE 11.9 MG/ML
1 SOLUTION TOPICAL 2 TIMES DAILY
Qty: 60 ML | Refills: 12 | Status: SHIPPED | OUTPATIENT
Start: 2024-07-25 | End: 2024-08-24

## 2024-07-25 NOTE — TELEPHONE ENCOUNTER
Enrollment form completed by patient and staff for Humira, given to MD for order and signature.  Labs have been ordered, pt aware to complete to continue with enrollment process.

## 2024-07-25 NOTE — PROGRESS NOTES
Anette Perez is a 33 year old female.    Chief Complaint   Patient presents with    Psoriasis     LOV 2017.  Pt present for psoriasis evaluation ongoing for 8 years. flaring areas include Scalp, bilateral arms, and bilateral legs.  No current treatment at this time.              Patient has no known allergies.  Current Outpatient Medications   Medication Sig Dispense Refill    clindamycin 1 % External Solution Apply 1 Application topically 2 (two) times daily for 60 doses. Apply to the affected area(s). Use bid as directed. 60 mL 12    Fluocinolone Acetonide Scalp (DERMA-SMOOTHE/FS SCALP) 0.01 % External Oil Use qhs as directed for scalp psoriasis 118 mL 12    fluocinonide 0.05 % External Cream Use twice daily on affected areas 60 g 3    Adalimumab (HUMIRA-PSORIASIS/UVEIT STARTER) 80 MG/0.8ML & 40MG/0.4ML Subcutaneous Pen-injector Kit Inject 80mg subcutaneously day 1. Then 40mg subcutaneous day 8 and day 22 then every 14 days 1 each 0    Adalimumab (HUMIRA, 2 PEN,) 40 MG/0.4ML Subcutaneous Pen-injector Kit Inject 40 mg into the skin every 14 (fourteen) days. Maintenance dose 2 each 5    cyclobenzaprine 10 MG Oral Tab Take 1 tablet (10 mg total) by mouth nightly. 30 tablet 0    atorvastatin 20 MG Oral Tab Take 1 tablet (20 mg total) by mouth daily. 90 tablet 3    ibuprofen 600 MG Oral Tab Take 1 tablet (600 mg total) by mouth every 6 (six) hours as needed for Pain. 30 tablet 0    omeprazole 20 MG Oral Capsule Delayed Release Take 1 capsule (20 mg total) by mouth every morning before breakfast. 90 capsule 3      Past Medical History:    Asthma (HCC)    Decorative tattoo    Esophageal reflux    Hyperlipidemia     (normal spontaneous vaginal delivery) (HCC)     (normal spontaneous vaginal delivery) (HCC)     (normal spontaneous vaginal delivery) (HCC)    Psoriasis    Visual impairment    glasses      Social History:  Social History     Socioeconomic History    Marital status:     Number  of children: 3   Occupational History    Occupation: [employer]     Employer: [employer]    Tobacco Use    Smoking status: Former     Current packs/day: 0.00     Types: Cigarettes     Quit date: 2016     Years since quittin.2    Smokeless tobacco: Never   Vaping Use    Vaping status: Never Used   Substance and Sexual Activity    Alcohol use: No     Alcohol/week: 0.0 standard drinks of alcohol    Drug use: No    Sexual activity: Yes     Birth control/protection: Condom   Other Topics Concern    Caffeine Concern Yes     Comment: coffee,soda    Grew up on a farm No    History of tanning Yes    Outdoor occupation No    Pt has a pacemaker No    Pt has a defibrillator No    Breast feeding No    Reaction to local anesthetic No     Social Determinants of Health     Financial Resource Strain: Not on File (2023)    Received from [facility]    Financial Resource Strain     Financial Resource Strain: 0   Food Insecurity: Not on File (2023)    Received from [facility]    Food Insecurity     Food: 0   Transportation Needs: Not on File (2023)    Received from [facility]    Transportation Needs     Transportation: 0   Physical Activity: Not on File (2023)    Received from [facility]    Physical Activity     Physical Activity: 0   Stress: Not on File (2023)    Received from [facility]    Stress     Stress: 0   Social Connections: Not on File (2023)    Received from [facility]    Social Connections     Social Connections and Isolation: 0   Housing Stability: Not on File (2023)    Received from [facility]    Housing Stability     Housin                 Current Outpatient Medications   Medication Sig Dispense Refill    clindamycin 1 % External Solution Apply 1 Application topically 2 (two) times daily for 60 doses. Apply to the affected area(s). Use bid as directed. 60 mL 12    Fluocinolone Acetonide Scalp (DERMA-SMOOTHE/FS SCALP) 0.01 % External Oil Use qhs as  directed for scalp psoriasis 118 mL 12    fluocinonide 0.05 % External Cream Use twice daily on affected areas 60 g 3    Adalimumab (HUMIRA-PSORIASIS/UVEIT STARTER) 80 MG/0.8ML & 40MG/0.4ML Subcutaneous Pen-injector Kit Inject 80mg subcutaneously day 1. Then 40mg subcutaneous day 8 and day 22 then every 14 days 1 each 0    Adalimumab (HUMIRA, 2 PEN,) 40 MG/0.4ML Subcutaneous Pen-injector Kit Inject 40 mg into the skin every 14 (fourteen) days. Maintenance dose 2 each 5    cyclobenzaprine 10 MG Oral Tab Take 1 tablet (10 mg total) by mouth nightly. 30 tablet 0    atorvastatin 20 MG Oral Tab Take 1 tablet (20 mg total) by mouth daily. 90 tablet 3    ibuprofen 600 MG Oral Tab Take 1 tablet (600 mg total) by mouth every 6 (six) hours as needed for Pain. 30 tablet 0    omeprazole 20 MG Oral Capsule Delayed Release Take 1 capsule (20 mg total) by mouth every morning before breakfast. 90 capsule 3     Allergies:   No Known Allergies    Past Medical History:    Asthma (HCC)    Decorative tattoo    Esophageal reflux    Hyperlipidemia     (normal spontaneous vaginal delivery) (HCC)     (normal spontaneous vaginal delivery) (HCC)     (normal spontaneous vaginal delivery) (HCC)    Psoriasis    Visual impairment    glasses     Past Surgical History:   Procedure Laterality Date    Hemorrhoidectomy  2019    L anterior midline    Tubal ligation       Social History     Socioeconomic History    Marital status:      Spouse name: Not on file    Number of children: 3    Years of education: Not on file    Highest education level: Not on file   Occupational History    Occupation: [employer]     Employer: [employer]    Tobacco Use    Smoking status: Former     Current packs/day: 0.00     Types: Cigarettes     Quit date: 2016     Years since quittin.2    Smokeless tobacco: Never   Vaping Use    Vaping status: Never Used   Substance and Sexual Activity    Alcohol use: No     Alcohol/week: 0.0  standard drinks of alcohol    Drug use: No    Sexual activity: Yes     Birth control/protection: Condom   Other Topics Concern     Service Not Asked    Blood Transfusions Not Asked    Caffeine Concern Yes     Comment: coffee,soda    Occupational Exposure Not Asked    Hobby Hazards Not Asked    Sleep Concern Not Asked    Stress Concern Not Asked    Weight Concern Not Asked    Special Diet Not Asked    Back Care Not Asked    Exercise Not Asked    Bike Helmet Not Asked    Seat Belt Not Asked    Self-Exams Not Asked    Grew up on a farm No    History of tanning Yes    Outdoor occupation No    Pt has a pacemaker No    Pt has a defibrillator No    Breast feeding No    Reaction to local anesthetic No   Social History Narrative    Not on file     Social Determinants of Health     Financial Resource Strain: Not on File (2023)    Received from [facility]    Financial Resource Strain     Financial Resource Strain: 0   Food Insecurity: Not on File (2023)    Received from [facility]    Food Insecurity     Food: 0   Transportation Needs: Not on File (2023)    Received from [facility]    Transportation Needs     Transportation: 0   Physical Activity: Not on File (2023)    Received from [facility]    Physical Activity     Physical Activity: 0   Stress: Not on File (2023)    Received from [facility]    Stress     Stress: 0   Social Connections: Not on File (2023)    Received from [facility]    Social Connections     Social Connections and Isolation: 0   Housing Stability: Not on File (2023)    Received from [facility]    Housing Stability     Housin     Family History   Problem Relation Age of Onset    Cancer Paternal Grandmother         stomach    Gastro-Intestinal Disorder Maternal Grandmother         bleeding ulcer (cause of death)    Asthma Maternal Grandmother     Genetic Disease Maternal Uncle         muscular dystrophy                      HPI :      Chief Complaint    Patient presents with    Psoriasis     LOV 7/2017.  Pt present for psoriasis evaluation ongoing for 8 years. flaring areas include Scalp, bilateral arms, and bilateral legs.  No current treatment at this time.      Worsening psoriasis  Flaring over the scalp   New areas over the arms and legs.  Spreading, itching patient very uncomfortable.  No obvious arthritis.    Patient presents with concerns above.    Past notes/ records and appropriate/relevant lab results including pathology and past body maps reviewed. Updated and new information noted in current visit.       ROS:    Denies any other systemic complaints.  No fevers, chills, night sweats, sensitivity to the sun, deeper lumps or bumps.  No other skin complaints.  History, medications, allergies as noted.    Physical examination: Patient  well-developed well-nourished, alert oriented in no acute distress.  Exam of involved, appropriate areas of skin performed, including scalp, head, neck, face,nails, hair, external eyes, including conjunctival mucosa, eyelids, lips, external ears, back, chest, abdomen, arms, legs, palms.  Remarkable for lesions as noted   See map for details     ASSESSMENT AND PLAN:     Encounter Diagnoses   Name Primary?    Psoriasis vulgaris Yes    Medication monitoring encounter     Pruritus     Acne vulgaris        Assessment / plan:  Acneiform lesions, folliculitis, clindamycin,  If not improving may need to add oral antibiotics.  Try to avoid at this time.  Acne. See medications in grid.  Skin care regimen discussed at length including cleansers, makeup, face washing, sunscreen.  Recheck in 6 -8 weeks if no improvement.  Notify us promptly if problems tolerating regimen.  Consider more aggressive therapy if not responding.     Psoriasis.  Plaque-type.  30-% body surface involvement.  Will treat with medications and grid.  Overall skincare, liberal use of emollients discussed.  Consider more aggressive therapy if worsening.Patient will  let us know how they are doing over the next several weeks.  Await clinical response to above therapy.  Recheck in 2-3 months if no improvement.    Widespread lesions especially over the scalp, increasing lesions over the arms hands.  Patient quite itchy more guttate lesions becoming more generalized as well.    Discussed options.  Systemic options including oral medications, Biologics reviewed  Biologics risk of infection, immunosuppression/alteration discussed.  Labs ordered  Side effect profile with Otezla, increased risk of heart disease with Dru/potentially take 2 inhibitors discussed.  In view of this we will proceed with Biologics    Begin Humira loading dose, maintenance dosing.  Medically necessary given widespread psoriasis worsening, scalp involvement, symptoms interfering with daily function, work, severe itching.    Plan recheck in 3 to 4 months  Topicals reviewed and interim Derma-Smoothe oil may use 2-3 times weekly if needed may use 2 more diffuse areas over scalp and limited areas as improving, fluocinonide twice daily avoid use on face    Await labs  Pathophysiology discussed with patient.  Therapeutic options reviewed.  See  Medications in grid.  Instructions reviewed at length.     General skin care questions answered.   Reassurance regarding benign skin lesions.Signs and symptoms of skin cancer, ABCDE's of melanoma briefly reviewed.  Sunscreen use (broad-spectrum, ideally mineral, UVA UVB coverage SPF 30 or greater recommended), sun protection, encouraged.  Followup as noted in rtc or p.r.n.  Encounter Times   Including precharting, reviewing chart, prior notes obtaining history, medical exam, notes on body map, plan, counseling, discussion of therapeutic options, patient education, orders more than half total time spent in face to face time with patient.  My total time spent caring for the patient on the day of the encounter: 45 minutes       The patient indicates understanding of these issues  and agrees to the plan.  The patient is asked to return as noted in follow-up /as noted above    This note was generated using Dragon voice recognition software.  Please contact me regarding any confusion resulting from errors in recognition.  Note to patient and family: The 21st Century Cures Act makes medical notes like these available to patients. However, be advised this is a medical document. It is intended as myzj-mn-milt communication and monitoring of a patient's care needs. It is written in medical language and may contain abbreviations or verbiage that are unfamiliar. It may appear blunt or direct. Medical documents are intended to carry relevant information, facts as evident and the clinical opinion of the practitioner.  Orders Placed This Encounter   Procedures    Quantiferon TB Gold (in Tube)    Hepatitis A B + C profile       Meds & Refills for this Visit:   Requested Prescriptions     Signed Prescriptions Disp Refills    clindamycin 1 % External Solution 60 mL 12     Sig: Apply 1 Application topically 2 (two) times daily for 60 doses. Apply to the affected area(s). Use bid as directed.    Fluocinolone Acetonide Scalp (DERMA-SMOOTHE/FS SCALP) 0.01 % External Oil 118 mL 12     Sig: Use qhs as directed for scalp psoriasis    fluocinonide 0.05 % External Cream 60 g 3     Sig: Use twice daily on affected areas    Adalimumab (HUMIRA-PSORIASIS/UVEIT STARTER) 80 MG/0.8ML & 40MG/0.4ML Subcutaneous Pen-injector Kit 1 each 0     Sig: Inject 80mg subcutaneously day 1. Then 40mg subcutaneous day 8 and day 22 then every 14 days    Adalimumab (HUMIRA, 2 PEN,) 40 MG/0.4ML Subcutaneous Pen-injector Kit 2 each 5     Sig: Inject 40 mg into the skin every 14 (fourteen) days. Maintenance dose       Encounter Diagnoses   Name Primary?    Psoriasis vulgaris Yes    Medication monitoring encounter        Orders Placed This Encounter   Procedures    Quantiferon TB Gold (in Tube)    Hepatitis A B + C profile       Results From  Past 48 Hours:  No results found for this or any previous visit (from the past 48 hour(s)).    Meds This Visit:      Imaging Orders:  None     Referral Orders:  No orders of the defined types were placed in this encounter.

## 2024-07-25 NOTE — TELEPHONE ENCOUNTER
Pt needs a refill on   Current Outpatient Medications     Dispense Refill      60 mL 12      118 mL 12      60 g 3    omeprazole 20 MG Oral Capsule Delayed Release Take 1 capsule (20 mg total) by mouth every morning before breakfast. 30 capsule 3

## 2024-07-26 LAB — HAV IGM SER QL: NONREACTIVE

## 2024-07-27 LAB
M TB IFN-G CD4+ T-CELLS BLD-ACNC: 0.01 IU/ML
M TB TUBERC IFN-G BLD QL: NEGATIVE
M TB TUBERC IGNF/MITOGEN IGNF CONTROL: >10 IU/ML
QFT TB1 AG MINUS NIL: 0 IU/ML
QFT TB2 AG MINUS NIL: 0 IU/ML

## 2024-07-27 NOTE — PROGRESS NOTES
QuantiFERON gold is negative patient may continue with their biologic medication.  Please proceed with authorizations as necessary.  Please fax Humira enrollment forms. Ok to proceed

## 2024-07-29 ENCOUNTER — TELEPHONE (OUTPATIENT)
Dept: DERMATOLOGY CLINIC | Facility: CLINIC | Age: 34
End: 2024-07-29

## 2024-07-29 NOTE — TELEPHONE ENCOUNTER
Enrollment forms, INS and TB test faxed to Blanca at [facility], please let us know when your OV notes are complete so that we can fax that too, thank you

## 2024-07-29 NOTE — TELEPHONE ENCOUNTER
Fax from [facility] confirming receipt of referral for Humira.  [facility] will begin the insurance verification process.

## 2024-07-30 RX ORDER — DOXYCYCLINE HYCLATE 50 MG/1
50 CAPSULE ORAL DAILY
Qty: 90 CAPSULE | Refills: 1 | OUTPATIENT
Start: 2024-07-30

## 2024-07-30 NOTE — TELEPHONE ENCOUNTER
Since pt is over 25yo, medications not covered by their insurance. PA not possible due to coverage exclusions. Good RX is a good option for lowering the cost of medication.     Alternative would be a po antibiotic like doxycycline 50mg qd

## 2024-07-30 NOTE — TELEPHONE ENCOUNTER
LOV 7/25/24. Do you want to attempt PA? If so, please route back with chart notes, the note is unsigned. Thank you.

## 2024-09-13 ENCOUNTER — TELEPHONE (OUTPATIENT)
Dept: DERMATOLOGY CLINIC | Facility: CLINIC | Age: 34
End: 2024-09-13

## 2024-11-27 ENCOUNTER — TELEPHONE (OUTPATIENT)
Dept: OBGYN CLINIC | Facility: CLINIC | Age: 34
End: 2024-11-27

## 2024-11-27 NOTE — TELEPHONE ENCOUNTER
Incoming fax from [facility].    MRI approved.  11/27/25-1/11/25  Request ID [id number]

## 2024-12-03 ENCOUNTER — HOSPITAL ENCOUNTER (OUTPATIENT)
Dept: MRI IMAGING | Age: 34
Discharge: HOME | End: 2024-12-03
Attending: OBSTETRICS & GYNECOLOGY
Payer: MEDICAID

## 2024-12-03 DIAGNOSIS — N83.299 COMPLEX OVARIAN CYST: ICD-10-CM

## 2024-12-03 PROCEDURE — A9575 INJ GADOTERATE MEGLUMI 0.1ML: HCPCS | Performed by: OBSTETRICS & GYNECOLOGY

## 2024-12-03 PROCEDURE — 72197 MRI PELVIS W/O & W/DYE: CPT | Performed by: OBSTETRICS & GYNECOLOGY

## 2024-12-03 RX ORDER — GADOTERATE MEGLUMINE 376.9 MG/ML
18 INJECTION INTRAVENOUS
Status: COMPLETED | OUTPATIENT
Start: 2024-12-03 | End: 2024-12-03

## 2024-12-03 RX ADMIN — GADOTERATE MEGLUMINE 18 ML: 376.9 INJECTION INTRAVENOUS at 18:28:00

## 2024-12-11 ENCOUNTER — TELEPHONE (OUTPATIENT)
Dept: OBGYN CLINIC | Facility: CLINIC | Age: 34
End: 2024-12-11

## 2024-12-11 DIAGNOSIS — N80.129 ENDOMETRIOMA: Primary | ICD-10-CM

## 2024-12-11 DIAGNOSIS — R10.2 PELVIC PAIN: ICD-10-CM

## 2024-12-11 NOTE — TELEPHONE ENCOUNTER
Pt name and  verified     Pt calling to discuss MRI results. Informed pt we will reach back out once Dr. Norman reviews results and provides recommendations. Pt verbalized understanding and agreed.     Routing to Dr. Norman.

## 2024-12-12 PROBLEM — R10.2 PELVIC PAIN: Status: ACTIVE | Noted: 2024-12-12

## 2024-12-12 PROBLEM — N80.129 ENDOMETRIOMA: Status: ACTIVE | Noted: 2024-12-12

## 2024-12-12 NOTE — TELEPHONE ENCOUNTER
I spoke with the patient, confirmed date and time for her procedure. I also sent a minor surgical case letter via [employer] confirmed the assist     Surgical case request has been sent     Prior authorization is pending, reference number [id number]. Clinicals have been uploaded

## 2024-12-16 ENCOUNTER — HOSPITAL ENCOUNTER (OUTPATIENT)
Age: 34
Discharge: HOME | End: 2024-12-16
Attending: EMERGENCY MEDICINE
Payer: MEDICAID

## 2024-12-16 VITALS
WEIGHT: 180 LBS | SYSTOLIC BLOOD PRESSURE: 113 MMHG | DIASTOLIC BLOOD PRESSURE: 81 MMHG | HEART RATE: 88 BPM | TEMPERATURE: 99 F | HEIGHT: 63 IN | RESPIRATION RATE: 18 BRPM | OXYGEN SATURATION: 99 % | BODY MASS INDEX: 31.89 KG/M2

## 2024-12-16 DIAGNOSIS — J02.0 STREP PHARYNGITIS: Primary | ICD-10-CM

## 2024-12-16 LAB
POCT INFLUENZA A: NEGATIVE
POCT INFLUENZA B: NEGATIVE
S PYO AG THROAT QL IA.RAPID: POSITIVE
SARS-COV-2 RNA RESP QL NAA+PROBE: NOT DETECTED

## 2024-12-16 PROCEDURE — 99213 OFFICE O/P EST LOW 20 MIN: CPT

## 2024-12-16 PROCEDURE — 87502 INFLUENZA DNA AMP PROBE: CPT | Performed by: EMERGENCY MEDICINE

## 2024-12-16 PROCEDURE — 87651 STREP A DNA AMP PROBE: CPT | Performed by: EMERGENCY MEDICINE

## 2024-12-16 RX ORDER — IBUPROFEN 600 MG/1
600 TABLET, FILM COATED ORAL ONCE
Status: COMPLETED | OUTPATIENT
Start: 2024-12-16 | End: 2024-12-16

## 2024-12-16 NOTE — ED PROVIDER NOTES
Patient Seen in: [facility]      History     Chief Complaint   Patient presents with    Cough    Sore Throat    Headache     Stated Complaint: sore throat headache cough    Subjective:   HPI      34-year-old female who presents to the immediate care complaining of having sore throat, headache and cough.  Patient is a  and says she may have been exposed to children on her bus that have been sick.  She was also exposed her daughter had pneumonia 2 weeks ago.  Her symptoms have been present for the past 4 days.  Temperature as high as 102.  Reviewing her record she was seen on 2024 for sore throat.    Objective:     Past Medical History:    Asthma (HCC)    Decorative tattoo    Esophageal reflux    Hyperlipidemia     (normal spontaneous vaginal delivery) (HCC)     (normal spontaneous vaginal delivery) (HCC)     (normal spontaneous vaginal delivery) (HCC)    Psoriasis    Visual impairment    glasses              Past Surgical History:   Procedure Laterality Date    Hemorrhoidectomy  2019    L anterior midline    Tubal ligation                  Social History     Socioeconomic History    Marital status:     Number of children: 3   Occupational History    Occupation: [employer]     Employer: [employer]    Tobacco Use    Smoking status: Former     Current packs/day: 0.00     Types: Cigarettes     Quit date: 2016     Years since quittin.6    Smokeless tobacco: Never   Vaping Use    Vaping status: Never Used   Substance and Sexual Activity    Alcohol use: No     Alcohol/week: 0.0 standard drinks of alcohol    Drug use: No    Sexual activity: Yes     Birth control/protection: Condom   Other Topics Concern    Caffeine Concern Yes     Comment: coffee,soda    Grew up on a farm No    History of tanning Yes    Outdoor occupation No    Pt has a pacemaker No    Pt has a defibrillator No    Breast feeding No    Reaction to local anesthetic No     Social  Drivers of Health     Financial Resource Strain: Not on File (2023)    Received from [facility]    Financial Resource Strain     Financial Resource Strain: 0   Food Insecurity: Not on File (2024)    Received from "[employer]    Food Insecurity     Food: 0   Transportation Needs: Not on File (2023)    Received from [facility]    Transportation Needs     Transportation: 0   Physical Activity: Not on File (2023)    Received from [facility]    Physical Activity     Physical Activity: 0   Stress: Not on File (2023)    Received from [facility]    Stress     Stress: 0   Social Connections: Not on File (2024)    Received from "[employer]    Social Connections     Connectedness: 0   Housing Stability: Not on File (2023)    Received from [facility]    Housing Stability     Housin              Review of Systems    Positive for stated complaint: sore throat headache cough  Other systems are as noted in HPI.  Constitutional and vital signs reviewed.      All other systems reviewed and negative except as noted above.    Physical Exam     ED Triage Vitals [24 1014]   /81   Pulse 88   Resp 18   Temp 98.6 °F (37 °C)   Temp src Oral   SpO2 99 %   O2 Device None (Room air)       Current Vitals:   Vital Signs  BP: 113/81  Pulse: 88  Resp: 18  Temp: 98.6 °F (37 °C)  Temp src: Oral    Oxygen Therapy  SpO2: 99 %  O2 Device: None (Room air)        Physical Exam  General: This a pleasant nontoxic appearing patient in no apparent distress alert and oriented ×3  HEENT: Pupils are equal reactive to light.  Extra ocular motions are intact.  No scleral icterus or conjunctival pallor: Neck is supple without tenderness on palpation.  Head is atraumatic normocephalic.  Oral mucosa moist.  Tongue is midline.  There are some erythema the posterior pharynx with slightly enlarged tonsils.  Uvula is midline.  No stridorous breathing.  Tympanic membranes are intact and clear bilaterally.    Lungs: Clear to  auscultation bilaterally.  No wheezes, rhonchi, or rales appreciated.  No accessory muscle use noted for breathing.  Cardiac: Regular rate and rhythm.  Normal S1 and 2 without murmurs or ectopy appreciated  Abdomen: Soft on examination without tenderness to deep palpation or to percussion.  No masses appreciated.  Bowel sounds are normoactive.  No CVA tenderness.  Extremities: No cyanosis, no edema or clubbing.  Pulses are +2.  Full range of motion is noted of the extremities without deformities.  No tenderness.  Neurologically intact.    ED Course     Labs Reviewed   RAPID STREP A - Abnormal; Notable for the following components:       Result Value    Strep A by PCR Positive (*)     All other components within normal limits   POCT FLU TEST   RAPID SARS-COV-2 BY PCR                   MDM    Differential diagnosis includes but is not limited to strep pharyngitis, COVID, influenza, nonspecific viral illness.  The patient strep was positive.  The patient will be treated for strep pharyngitis.  Written for antibiotic therapy.  Return if symptoms progress or worsen.  Note to Patient  The 21st Century Cures Act makes medical notes like these available to patients in the interest of transparency. However, be advised this is a medical document and is intended as snax-zj-mbra communication; it is written in medical language and may appear blunt, direct, or contain abbreviations or verbiage that are unfamiliar. Medical documents are intended to carry relevant information, facts as evident, and the clinical opinion of the practitioner.  Patient was evaluated and a screening exam was performed.   As a treating physician attending to the patient, I determined, within reasonable clinical confidence and prior to discharge, that an emergency medical condition was not or was no longer present.  There was no indication for further evaluation, treatment or admission on an emergency basis.  Comprehensive verbal and written discharge  and follow-up instructions were provided to help prevent relapse or worsening.  Patient was instructed to follow-up with their primary care provider for further evaluation and treatment, but to return immediately to the ER for worsening, concerning, new, changing or persisting symptoms.  I discussed the case with the patient and they had no questions, complaints, or concerns.  Patient felt comfortable going home.  ^^Please note that this report has been produced using speech recognition software and may contain errors related to that system including, but not limited to, errors in grammar, punctuation, and spelling, as well as words and phrases that possibly may have been recognized inappropriately.  If there are any questions or concerns, contact the dictating provider for clarification.  Medical Decision Making      Disposition and Plan     Clinical Impression:  1. Strep pharyngitis         Disposition:  Discharge  12/16/2024 11:07 am    Follow-up:  Chava Werner, DO  [address] [place] [address]  [phone]    Schedule an appointment as soon as possible for a visit in 1 week  As needed          Medications Prescribed:  Current Discharge Medication List        START taking these medications    Details   amoxicillin clavulanate 875-125 MG Oral Tab Take 1 tablet by mouth 2 (two) times daily for 10 days.  Qty: 20 tablet, Refills: 0                 Supplementary Documentation:

## 2024-12-16 NOTE — ED INITIAL ASSESSMENT (HPI)
C/o sore throat , bodyaches, headache, and  cough since Friday. Exposed to daughter with Pneumonia 2 weeks ago

## 2024-12-27 ENCOUNTER — APPOINTMENT (OUTPATIENT)
Dept: CT IMAGING | Facility: HOSPITAL | Age: 34
End: 2024-12-27
Attending: EMERGENCY MEDICINE
Payer: MEDICAID

## 2024-12-27 ENCOUNTER — OFFICE VISIT (OUTPATIENT)
Dept: FAMILY MEDICINE CLINIC | Facility: CLINIC | Age: 34
End: 2024-12-27
Payer: MEDICAID

## 2024-12-27 ENCOUNTER — HOSPITAL ENCOUNTER (EMERGENCY)
Facility: HOSPITAL | Age: 34
Discharge: HOME | End: 2024-12-27
Attending: EMERGENCY MEDICINE
Payer: MEDICAID

## 2024-12-27 VITALS
TEMPERATURE: 99 F | SYSTOLIC BLOOD PRESSURE: 114 MMHG | HEART RATE: 116 BPM | OXYGEN SATURATION: 100 % | RESPIRATION RATE: 21 BRPM | DIASTOLIC BLOOD PRESSURE: 69 MMHG

## 2024-12-27 VITALS
SYSTOLIC BLOOD PRESSURE: 110 MMHG | WEIGHT: 186 LBS | DIASTOLIC BLOOD PRESSURE: 75 MMHG | HEART RATE: 98 BPM | BODY MASS INDEX: 32.96 KG/M2 | HEIGHT: 63 IN

## 2024-12-27 DIAGNOSIS — L65.9 ALOPECIA: Primary | ICD-10-CM

## 2024-12-27 DIAGNOSIS — J02.0 STREP PHARYNGITIS: Primary | ICD-10-CM

## 2024-12-27 DIAGNOSIS — J02.9 SORE THROAT: ICD-10-CM

## 2024-12-27 DIAGNOSIS — J18.9 COMMUNITY ACQUIRED PNEUMONIA OF LEFT UPPER LOBE OF LUNG: ICD-10-CM

## 2024-12-27 LAB
ALBUMIN SERPL-MCNC: 4.4 G/DL (ref 3.2–4.8)
ALBUMIN/GLOB SERPL: 1.4 {RATIO} (ref 1–2)
ALP LIVER SERPL-CCNC: 72 U/L
ALT SERPL-CCNC: 21 U/L
ANION GAP SERPL CALC-SCNC: 11 MMOL/L (ref 0–18)
AST SERPL-CCNC: 16 U/L (ref ?–34)
B-HCG UR QL: NEGATIVE
BASOPHILS # BLD AUTO: 0.05 X10(3) UL (ref 0–0.2)
BASOPHILS NFR BLD AUTO: 0.2 %
BILIRUB SERPL-MCNC: 1.1 MG/DL (ref 0.3–1.2)
BILIRUB UR QL STRIP.AUTO: NEGATIVE
BUN BLD-MCNC: 11 MG/DL (ref 9–23)
CALCIUM BLD-MCNC: 9.4 MG/DL (ref 8.7–10.4)
CHLORIDE SERPL-SCNC: 105 MMOL/L (ref 98–112)
CLARITY UR REFRACT.AUTO: CLEAR
CO2 SERPL-SCNC: 21 MMOL/L (ref 21–32)
CONTROL LINE PRESENT WITH A CLEAR BACKGROUND (YES/NO): YES YES/NO
CREAT BLD-MCNC: 0.77 MG/DL
EGFRCR SERPLBLD CKD-EPI 2021: 104 ML/MIN/1.73M2 (ref 60–?)
EOSINOPHIL # BLD AUTO: 0.02 X10(3) UL (ref 0–0.7)
EOSINOPHIL NFR BLD AUTO: 0.1 %
ERYTHROCYTE [DISTWIDTH] IN BLOOD BY AUTOMATED COUNT: 11.8 %
FLUAV + FLUBV RNA SPEC NAA+PROBE: NEGATIVE
FLUAV + FLUBV RNA SPEC NAA+PROBE: NEGATIVE
GLOBULIN PLAS-MCNC: 3.1 G/DL (ref 2–3.5)
GLUCOSE BLD-MCNC: 100 MG/DL (ref 70–99)
GLUCOSE UR STRIP.AUTO-MCNC: NORMAL MG/DL
HCT VFR BLD AUTO: 38.9 %
HGB BLD-MCNC: 13.5 G/DL
IMM GRANULOCYTES # BLD AUTO: 0.25 X10(3) UL (ref 0–1)
IMM GRANULOCYTES NFR BLD: 1 %
KETONES UR STRIP.AUTO-MCNC: 40 MG/DL
KIT LOT #: ABNORMAL NUMERIC
LEUKOCYTE ESTERASE UR QL STRIP.AUTO: NEGATIVE
LYMPHOCYTES # BLD AUTO: 1.65 X10(3) UL (ref 1–4)
LYMPHOCYTES NFR BLD AUTO: 6.4 %
MCH RBC QN AUTO: 29.7 PG (ref 26–34)
MCHC RBC AUTO-ENTMCNC: 34.7 G/DL (ref 31–37)
MCV RBC AUTO: 85.5 FL
MONOCYTES # BLD AUTO: 1.47 X10(3) UL (ref 0.1–1)
MONOCYTES NFR BLD AUTO: 5.7 %
NEUTROPHILS # BLD AUTO: 22.53 X10 (3) UL (ref 1.5–7.7)
NEUTROPHILS # BLD AUTO: 22.53 X10(3) UL (ref 1.5–7.7)
NEUTROPHILS NFR BLD AUTO: 86.6 %
NITRITE UR QL STRIP.AUTO: NEGATIVE
OSMOLALITY SERPL CALC.SUM OF ELEC: 283 MOSM/KG (ref 275–295)
PH UR STRIP.AUTO: 7.5 [PH] (ref 5–8)
PLATELET # BLD AUTO: 220 10(3)UL (ref 150–450)
POTASSIUM SERPL-SCNC: 3.9 MMOL/L (ref 3.5–5.1)
PROT SERPL-MCNC: 7.5 G/DL (ref 5.7–8.2)
PROT UR STRIP.AUTO-MCNC: NEGATIVE MG/DL
RBC # BLD AUTO: 4.55 X10(6)UL
RBC UR QL AUTO: NEGATIVE
RSV RNA SPEC NAA+PROBE: NEGATIVE
SARS-COV-2 RNA RESP QL NAA+PROBE: NOT DETECTED
SODIUM SERPL-SCNC: 137 MMOL/L (ref 136–145)
SP GR UR STRIP.AUTO: 1.01 (ref 1–1.03)
STREP GRP A CUL-SCR: POSITIVE
UROBILINOGEN UR STRIP.AUTO-MCNC: NORMAL MG/DL
WBC # BLD AUTO: 26 X10(3) UL (ref 4–11)

## 2024-12-27 PROCEDURE — 85025 COMPLETE CBC W/AUTO DIFF WBC: CPT

## 2024-12-27 PROCEDURE — 87430 STREP A AG IA: CPT | Performed by: EMERGENCY MEDICINE

## 2024-12-27 PROCEDURE — 99213 OFFICE O/P EST LOW 20 MIN: CPT | Performed by: FAMILY MEDICINE

## 2024-12-27 PROCEDURE — 81003 URINALYSIS AUTO W/O SCOPE: CPT | Performed by: EMERGENCY MEDICINE

## 2024-12-27 PROCEDURE — 0241U SARS-COV-2/FLU A AND B/RSV BY PCR (GENEXPERT): CPT | Performed by: EMERGENCY MEDICINE

## 2024-12-27 PROCEDURE — 80053 COMPREHEN METABOLIC PANEL: CPT

## 2024-12-27 PROCEDURE — 70491 CT SOFT TISSUE NECK W/DYE: CPT | Performed by: EMERGENCY MEDICINE

## 2024-12-27 PROCEDURE — 96375 TX/PRO/DX INJ NEW DRUG ADDON: CPT

## 2024-12-27 PROCEDURE — 80053 COMPREHEN METABOLIC PANEL: CPT | Performed by: EMERGENCY MEDICINE

## 2024-12-27 PROCEDURE — 81025 URINE PREGNANCY TEST: CPT

## 2024-12-27 PROCEDURE — 96361 HYDRATE IV INFUSION ADD-ON: CPT

## 2024-12-27 PROCEDURE — 85025 COMPLETE CBC W/AUTO DIFF WBC: CPT | Performed by: EMERGENCY MEDICINE

## 2024-12-27 PROCEDURE — 87040 BLOOD CULTURE FOR BACTERIA: CPT | Performed by: EMERGENCY MEDICINE

## 2024-12-27 PROCEDURE — 36415 COLL VENOUS BLD VENIPUNCTURE: CPT

## 2024-12-27 PROCEDURE — 99285 EMERGENCY DEPT VISIT HI MDM: CPT

## 2024-12-27 PROCEDURE — 0241U SARS-COV-2/FLU A AND B/RSV BY PCR (GENEXPERT): CPT

## 2024-12-27 PROCEDURE — 96365 THER/PROPH/DIAG IV INF INIT: CPT

## 2024-12-27 PROCEDURE — 87880 STREP A ASSAY W/OPTIC: CPT | Performed by: FAMILY MEDICINE

## 2024-12-27 RX ORDER — DOXYCYCLINE HYCLATE 100 MG
100 TABLET ORAL 2 TIMES DAILY
Qty: 20 TABLET | Refills: 0 | Status: SHIPPED | OUTPATIENT
Start: 2024-12-27

## 2024-12-27 RX ORDER — ONDANSETRON 2 MG/ML
4 INJECTION INTRAMUSCULAR; INTRAVENOUS ONCE
Status: COMPLETED | OUTPATIENT
Start: 2024-12-27 | End: 2024-12-27

## 2024-12-27 RX ORDER — AZITHROMYCIN 250 MG/1
500 TABLET, FILM COATED ORAL ONCE
Status: COMPLETED | OUTPATIENT
Start: 2024-12-27 | End: 2024-12-27

## 2024-12-27 RX ORDER — KETOROLAC TROMETHAMINE 30 MG/ML
30 INJECTION, SOLUTION INTRAMUSCULAR; INTRAVENOUS ONCE
Status: COMPLETED | OUTPATIENT
Start: 2024-12-27 | End: 2024-12-27

## 2024-12-27 RX ORDER — CEFDINIR 300 MG/1
300 CAPSULE ORAL 2 TIMES DAILY
Qty: 20 CAPSULE | Refills: 0 | Status: SHIPPED | OUTPATIENT
Start: 2024-12-27 | End: 2025-01-06

## 2024-12-27 RX ORDER — HYDROCODONE BITARTRATE AND ACETAMINOPHEN 5; 325 MG/1; MG/1
1 TABLET ORAL EVERY 6 HOURS PRN
Qty: 12 TABLET | Refills: 0 | Status: SHIPPED | OUTPATIENT
Start: 2024-12-27 | End: 2025-01-02

## 2024-12-27 RX ORDER — AZITHROMYCIN 250 MG/1
TABLET, FILM COATED ORAL
Qty: 6 TABLET | Refills: 0 | Status: SHIPPED | OUTPATIENT
Start: 2024-12-27 | End: 2025-01-02

## 2024-12-27 RX ORDER — CLINDAMYCIN HYDROCHLORIDE 300 MG/1
300 CAPSULE ORAL 3 TIMES DAILY
Qty: 30 CAPSULE | Refills: 0 | Status: SHIPPED | OUTPATIENT
Start: 2024-12-27

## 2024-12-27 RX ORDER — MORPHINE SULFATE 4 MG/ML
2 INJECTION, SOLUTION INTRAMUSCULAR; INTRAVENOUS ONCE
Status: COMPLETED | OUTPATIENT
Start: 2024-12-27 | End: 2024-12-27

## 2024-12-27 NOTE — PROGRESS NOTES
Blood pressure 110/75, pulse 98, height 5' 3\" (1.6 m), weight 186 lb (84.4 kg), last menstrual period 12/11/2024, not currently breastfeeding.        2-week history of yellow nasal congestion with sore throat.  Has dysphonia and dysphagia.  Some chills no fever.  Denies difficulty breathing.  Right temporal headache with facial pain.  No tooth pain or bad breath.  No sneezing, watery or itchy eyes.  She did not lose her voice.  Some relief with ibuprofen no relief with Tylenol she does not smoke remote history of asthma no cough at this time.  Has had a tubal ligation.  Denies ear pain.  Objective throat erythematous no exudate    Lungs clear to auscultation no rales rhonchi or wheezes    Assessment sinusitis    Plan strep swab also doxycycline prescription    Ibuprofen 800 mg over-the-counter 3 times daily    Chloraseptic throat spray vaporizer in bedroom    Throat coat tea    Follow-up if no improvement

## 2024-12-27 NOTE — ED PROVIDER NOTES
Patient Seen in: [facility] Emergency Department      History     Chief Complaint   Patient presents with    Body ache and/or chills     Stated Complaint: HA, body aches, chills, +strep and sent home with antibx.    Subjective:   HPI      Patient is a 34-year-old female who states she was diagnosed with strep throat about a week and a half ago.  Completed antibiotics and felt better.  However last night began feeling sick again with myalgias, diffuse back pain, nausea and vomiting, headache, recurrent sore throat.  No cough or congestion.  No abdominal pain or diarrhea.    Objective:     Past Medical History:    Asthma (HCC)    Decorative tattoo    Esophageal reflux    Hyperlipidemia     (normal spontaneous vaginal delivery) ([facility])     (normal spontaneous vaginal delivery) (HCC)     (normal spontaneous vaginal delivery) ([facility])    Psoriasis    Visual impairment    glasses              Past Surgical History:   Procedure Laterality Date    Hemorrhoidectomy  2019    L anterior midline    Tubal ligation                  Social History     Socioeconomic History    Marital status:     Number of children: 3   Occupational History    Occupation: [employer]     Employer: [employer]    Tobacco Use    Smoking status: Former     Current packs/day: 0.00     Types: Cigarettes     Quit date: 2016     Years since quittin.6     Passive exposure: Never    Smokeless tobacco: Never   Vaping Use    Vaping status: Never Used   Substance and Sexual Activity    Alcohol use: No     Alcohol/week: 0.0 standard drinks of alcohol    Drug use: No    Sexual activity: Yes     Birth control/protection: Condom   Other Topics Concern    Caffeine Concern Yes     Comment: coffee,soda    Grew up on a farm No    History of tanning Yes    Outdoor occupation No    Pt has a pacemaker No    Pt has a defibrillator No    Breast feeding No    Reaction to local anesthetic No     Social Drivers of Health      Financial Resource Strain: Not on File (2023)    Received from [facility]    Financial Resource Strain     Financial Resource Strain: 0   Food Insecurity: Not on File (2024)    Received from [facility]    Food Insecurity     Food: 0   Transportation Needs: Not on File (2023)    Received from [facility]    Transportation Needs     Transportation: 0   Physical Activity: Not on File (2023)    Received from [facility]    Physical Activity     Physical Activity: 0   Stress: Not on File (2023)    Received from [facility]    Stress     Stress: 0   Social Connections: Not on File (2024)    Received from [facility]    Social Connections     Connectedness: 0   Housing Stability: Not on File (2023)    Received from [facility]    Housing Stability     Housin                  Physical Exam     ED Triage Vitals   BP 24 1601 107/79   Pulse 24 1601 (!) 139   Resp 24 1601 18   Temp 24 1601 98.6 °F (37 °C)   Temp src 24 1823 Oral   SpO2 24 1601 99 %   O2 Device 24 1700 None (Room air)       Current Vitals:   Vital Signs  BP: 114/69  Pulse: 116  Resp: 21  Temp: 98.9 °F (37.2 °C)  Temp src: Oral  MAP (mmHg): 83    Oxygen Therapy  SpO2: 100 %  O2 Device: None (Room air)        Physical Exam  Vitals and nursing note reviewed.   Constitutional:       Appearance: She is well-developed.   HENT:      Head: Normocephalic and atraumatic.      Mouth/Throat:      Pharynx: No oropharyngeal exudate or posterior oropharyngeal erythema.   Eyes:      Conjunctiva/sclera: Conjunctivae normal.      Pupils: Pupils are equal, round, and reactive to light.   Cardiovascular:      Rate and Rhythm: Regular rhythm. Tachycardia present.      Heart sounds: Normal heart sounds.   Pulmonary:      Effort: Pulmonary effort is normal.      Breath sounds: Normal breath sounds.   Abdominal:      General: Bowel sounds are normal.      Palpations: Abdomen is soft.   Musculoskeletal:          General: Normal range of motion.      Cervical back: Normal range of motion and neck supple.   Skin:     General: Skin is warm and dry.   Neurological:      Mental Status: She is alert and oriented to person, place, and time.             ED Course     Labs Reviewed   CBC WITH DIFFERENTIAL WITH PLATELET - Abnormal; Notable for the following components:       Result Value    WBC 26.0 (*)     Neutrophil Absolute Prelim 22.53 (*)     Neutrophil Absolute 22.53 (*)     Monocyte Absolute 1.47 (*)     All other components within normal limits   COMP METABOLIC PANEL (14) - Abnormal; Notable for the following components:    Glucose 100 (*)     All other components within normal limits   URINALYSIS WITH CULTURE REFLEX - Abnormal; Notable for the following components:    Ketones Urine 40 (*)     All other components within normal limits   RAPID STREP A SCREEN (LC) - Abnormal; Notable for the following components:    Rapid Strep A Result Positive for Beta Streptococcus, Group A (*)     All other components within normal limits   POCT PREGNANCY URINE - Normal   SARS-COV-2/FLU A AND B/RSV BY PCR (GENEXPERT) - Normal    Narrative:     This test is intended for the qualitative detection and differentiation of SARS-CoV-2, influenza A, influenza B, and respiratory syncytial virus (RSV) viral RNA in nasopharyngeal or nares swabs from individuals suspected of respiratory viral infection consistent with COVID-19 by their healthcare provider. Signs and symptoms of respiratory viral infection due to SARS-CoV-2, influenza, and RSV can be similar.    Test performed using the Xpert Xpress SARS-CoV-2/FLU/RSV (real time RT-PCR)  assay on the GeneXpert instrument, [employer], [place] [address].   This test is being used under the Food and Drug Administration's Emergency Use Authorization.    The authorized Fact Sheet for Healthcare Providers for this assay is available upon request from the laboratory.   RAINBOW DRAW LAVENDER   RAINBOW DRAW  LIGHT GREEN   RAINBOW DRAW BLUE   RAINBOW DRAW GOLD   BLOOD CULTURE   BLOOD CULTURE            CT SOFT TISSUE OF NECK(CONTRAST ONLY) (CPT=70491)    Result Date: 12/27/2024  PROCEDURE:  CT SOFT TISSUE OF NECK(CONTRAST ONLY) (CPT=70491)  COMPARISON:  None.  INDICATIONS:  HA, body aches, chills, +strep and sent home with antibx.  TECHNIQUE:  IV contrast-enhanced multislice CT scanning is performed through the neck soft tissues during administration of nonionic contrast.  Dose reduction techniques were used. Dose information is transmitted to the ACR (American College of Radiology) NRDR (National Radiology Data Registry) which includes the Dose Index Registry.  PATIENT STATED HISTORY:(As transcribed by Technologist)  Patient states streph throat for eleven days.  Patient on antibiotic with no relief.  Body aches and chills   CONTRAST USED:  50cc of Isovue 370  FINDINGS: NASOPHARYNX:  Fossae of Rosenmuller and torus tubarius are symmetric.  ORAL CAVITY:  No visible mass.  OROPHARYNX:  Prominence of the bilateral palatine tonsils may be seen with tonsillitis.  There is no abscess. HYPOPHARYNX:  No mass or other visible lesion.  LARYNX:  The vocal cords are symmetric and without mass.  SINUSES:  Limited views show no significant fluid or mucosal thickening.  NECK GLANDS:  The parotid, submandibular, and thyroid glands are unremarkable.  LYMPH NODES:  No pathological-appearing or enlarged lymph nodes.  SKULL BASE:  Foramina are symmetric without bony erosion.  VASCULATURE:  Limited views are unremarkable.  BONES:  No significant osseous lesions.  OTHER:  Nodular opacity in the is concerning infiltrate.             CONCLUSION:  1. Prominence of bilateral palatine tonsils may be seen tonsillitis.  There is no evidence of abscess.  2. Nodular opacity in the left upper lobe is concerning for infiltrate.   LOCATION:  [facility]    Dictated by (CST): Rahul Maravilla MD on 12/27/2024 at 8:12 PM     Finalized by (CST): Rahul Maravilla MD  on 12/27/2024 at 8:18 PM                MDM      34-year-old female with recent strep infection treated with antibiotics and symptoms improved presenting with myalgias, back pain, nausea and vomiting, sore throat, headache.  Suspicious for viral syndrome like flu or COVID.  She is afebrile, little tachycardic, but states she does not feel well but not ill-appearing.  She does not appear toxic.  Labs ordered in triage along with a GEN expert.  Will check a urinalysis.  Will repeat a strep as she does state her throat hurts again.    Update at 8:40 PM.  Patient with significant leukocytosis of 26,000.  Rapid strep is positive.  No evidence of abscess on exam but due to her significant leukocytosis as well as negative GEN expert swab a CT of her neck was obtained to rule out deep space infection or abscess.  This is negative beyond tonsillitis in her throat however does demonstrate  left upper lobe infiltrate as well.  She will be given Rocephin and azithromycin.  Heart rate has remained elevated here although she is feeling a little bit better now.  I think she be discharged home, follow-up with PMD.        Past Medical History-asthma, high cholesterol    Differential diagnosis before testing included flu, COVID, RSV, strep, other viral syndrome    Co-morbidities that add to the complexity of management include: None    Testing ordered during this visit included labs, GEN expert, CT soft tissue neck    Radiographic images  I personally reviewed the radiographs and my individual interpretation shows tonsillitis, no abscess  I also reviewed the official reports that showed tonsillitis, no abscess      Medications Provided: IV fluids, Rocephin, azithromycin            Disposition:        Discharge  I have discussed with the patient the results of test, differential diagnosis, treatment plan, warning signs and symptoms which should prompt immediate return.  They expressed understanding of these instructions and agrees to  the following plan provided.  They were given written discharge instructions and agrees to return for any concerns and voiced understanding and all questions were answered.      Medical Decision Making      Disposition and Plan     Clinical Impression:  1. Strep pharyngitis    2. Community acquired pneumonia of left upper lobe of lung         Disposition:  Discharge  12/27/2024  9:38 pm    Follow-up:  Chava Werner, DO  [address] [place] [address]  [phone]    Follow up            Medications Prescribed:  Current Discharge Medication List        START taking these medications    Details   azithromycin (ZITHROMAX Z-NICKI) 250 MG Oral Tab 500 mg once followed by 250 mg daily x 4 days  Qty: 6 tablet, Refills: 0      cefdinir 300 MG Oral Cap Take 1 capsule (300 mg total) by mouth 2 (two) times daily for 10 days.  Qty: 20 capsule, Refills: 0      HYDROcodone-acetaminophen 5-325 MG Oral Tab Take 1 tablet by mouth every 6 (six) hours as needed.  Qty: 12 tablet, Refills: 0    Associated Diagnoses: Strep pharyngitis; Community acquired pneumonia of left upper lobe of lung                 Supplementary Documentation:

## 2024-12-28 NOTE — DISCHARGE INSTRUCTIONS
Antibiotics as prescribed starting tomorrow.  Hydrocodone as needed for pain.  You can also take ibuprofen for achiness or fevers.

## 2025-01-03 ENCOUNTER — OFFICE VISIT (OUTPATIENT)
Dept: DERMATOLOGY CLINIC | Facility: CLINIC | Age: 35
End: 2025-01-03
Payer: MEDICAID

## 2025-01-03 DIAGNOSIS — L40.0 PSORIASIS VULGARIS: ICD-10-CM

## 2025-01-03 DIAGNOSIS — L63.9 ALOPECIA AREATA: Primary | ICD-10-CM

## 2025-01-03 DIAGNOSIS — Z51.81 MEDICATION MONITORING ENCOUNTER: ICD-10-CM

## 2025-01-03 PROCEDURE — 11900 INJECT SKIN LESIONS </W 7: CPT | Performed by: DERMATOLOGY

## 2025-01-03 PROCEDURE — 99214 OFFICE O/P EST MOD 30 MIN: CPT | Performed by: DERMATOLOGY

## 2025-01-03 RX ORDER — CLOBETASOL PROPIONATE 0.5 MG/ML
SOLUTION TOPICAL
Qty: 50 ML | Refills: 6 | Status: SHIPPED | OUTPATIENT
Start: 2025-01-03

## 2025-01-03 RX ORDER — TACROLIMUS 1 MG/G
OINTMENT TOPICAL
Qty: 30 G | Refills: 1 | Status: SHIPPED | OUTPATIENT
Start: 2025-01-03

## 2025-01-03 RX ORDER — HYDROXYZINE HYDROCHLORIDE 10 MG/1
TABLET, FILM COATED ORAL
Qty: 60 TABLET | Refills: 2 | Status: SHIPPED | OUTPATIENT
Start: 2025-01-03

## 2025-01-06 NOTE — PROGRESS NOTES
Anette Perez is a 34 year old female.    Chief Complaint   Patient presents with    Derm Problem     LOV 2024 Pt present for hair loss. Pt reports the hair loss began over the past 2 weeks. Pt has bald spots on both sides of the scalp. Pt isn't using anything for treatment at this time. Pt also reports dry spots on the face.             Patient has no known allergies.  Current Outpatient Medications   Medication Sig Dispense Refill    clobetasol 0.05 % External Solution Use bid  As directed to scalp 50 mL 6    Minoxidil 5 % External Solution Apply to areas of hair loss qd 60 mL 1    tacrolimus (PROTOPIC) 0.1 % External Ointment Use at bedtime to areas of psoriasis on face 30 g 1    hydrOXYzine 10 MG Oral Tab 1 po at bedtime x 3 days, increase to 2 po at bedtime as tolerated 60 tablet 2    cefdinir 300 MG Oral Cap Take 1 capsule (300 mg total) by mouth 2 (two) times daily for 10 days. 20 capsule 0    Adalimumab (HUMIRA, 2 PEN,) 40 MG/0.4ML Subcutaneous Pen-injector Kit Inject 40 mg into the skin every 14 (fourteen) days. Maintenance dose 2 each 5    omeprazole 20 MG Oral Capsule Delayed Release Take 1 capsule (20 mg total) by mouth every morning before breakfast. (Patient taking differently: Take 1 capsule (20 mg total) by mouth as needed.) 90 capsule 3    Fluocinolone Acetonide Scalp (DERMA-SMOOTHE/FS SCALP) 0.01 % External Oil Use qhs as directed for scalp psoriasis 118 mL 12    fluocinonide 0.05 % External Cream Use twice daily on affected areas 60 g 3      Past Medical History:    Asthma (HCC)    Decorative tattoo    Esophageal reflux    Hyperlipidemia     (normal spontaneous vaginal delivery) (HCC)     (normal spontaneous vaginal delivery) (HCC)     (normal spontaneous vaginal delivery) (HCC)    Psoriasis    Visual impairment    glasses      Social History:  Social History     Socioeconomic History    Marital status:     Number of children: 3   Occupational History     Occupation: [employer]     Employer: [employer]    Tobacco Use    Smoking status: Former     Current packs/day: 0.00     Types: Cigarettes     Quit date: 2016     Years since quittin.7     Passive exposure: Never    Smokeless tobacco: Never   Vaping Use    Vaping status: Never Used   Substance and Sexual Activity    Alcohol use: No     Alcohol/week: 0.0 standard drinks of alcohol    Drug use: No    Sexual activity: Yes     Birth control/protection: Condom   Other Topics Concern    Caffeine Concern Yes     Comment: coffee,soda    Grew up on a farm No    History of tanning Yes    Outdoor occupation No    Pt has a pacemaker No    Pt has a defibrillator No    Breast feeding No    Reaction to local anesthetic No     Social Drivers of Health     Financial Resource Strain: Not on File (2023)    Received from [facility]    Financial Resource Strain     Financial Resource Strain: 0   Food Insecurity: Not on File (2024)    Received from [facility]    Food Insecurity     Food: 0   Transportation Needs: Not on File (2023)    Received from [facility]    Transportation Needs     Transportation: 0   Physical Activity: Not on File (2023)    Received from [facility]    Physical Activity     Physical Activity: 0   Stress: Not on File (2023)    Received from [facility]    Stress     Stress: 0   Social Connections: Not on File (2024)    Received from [facility]    Social Connections     Connectedness: 0   Housing Stability: Not on File (2023)    Received from [facility]    Housing Stability     Housin                 Current Outpatient Medications   Medication Sig Dispense Refill    clobetasol 0.05 % External Solution Use bid  As directed to scalp 50 mL 6    Minoxidil 5 % External Solution Apply to areas of hair loss qd 60 mL 1    tacrolimus (PROTOPIC) 0.1 % External Ointment Use at bedtime to areas of psoriasis on face 30 g 1    hydrOXYzine 10 MG Oral Tab 1 po at bedtime x 3 days,  increase to 2 po at bedtime as tolerated 60 tablet 2    cefdinir 300 MG Oral Cap Take 1 capsule (300 mg total) by mouth 2 (two) times daily for 10 days. 20 capsule 0    Adalimumab (HUMIRA, 2 PEN,) 40 MG/0.4ML Subcutaneous Pen-injector Kit Inject 40 mg into the skin every 14 (fourteen) days. Maintenance dose 2 each 5    omeprazole 20 MG Oral Capsule Delayed Release Take 1 capsule (20 mg total) by mouth every morning before breakfast. (Patient taking differently: Take 1 capsule (20 mg total) by mouth as needed.) 90 capsule 3    Fluocinolone Acetonide Scalp (DERMA-SMOOTHE/FS SCALP) 0.01 % External Oil Use qhs as directed for scalp psoriasis 118 mL 12    fluocinonide 0.05 % External Cream Use twice daily on affected areas 60 g 3     Allergies:   No Known Allergies    Past Medical History:    Asthma (HCC)    Decorative tattoo    Esophageal reflux    Hyperlipidemia     (normal spontaneous vaginal delivery) (HCC)     (normal spontaneous vaginal delivery) (HCC)     (normal spontaneous vaginal delivery) (HCC)    Psoriasis    Visual impairment    glasses     Past Surgical History:   Procedure Laterality Date    Hemorrhoidectomy  2019    L anterior midline    Tubal ligation       Social History     Socioeconomic History    Marital status:      Spouse name: Not on file    Number of children: 3    Years of education: Not on file    Highest education level: Not on file   Occupational History    Occupation: [employer]     Employer: [employer]    Tobacco Use    Smoking status: Former     Current packs/day: 0.00     Types: Cigarettes     Quit date: 2016     Years since quittin.7     Passive exposure: Never    Smokeless tobacco: Never   Vaping Use    Vaping status: Never Used   Substance and Sexual Activity    Alcohol use: No     Alcohol/week: 0.0 standard drinks of alcohol    Drug use: No    Sexual activity: Yes     Birth control/protection: Condom   Other Topics Concern      Service Not Asked    Blood Transfusions Not Asked    Caffeine Concern Yes     Comment: coffee,soda    Occupational Exposure Not Asked    Hobby Hazards Not Asked    Sleep Concern Not Asked    Stress Concern Not Asked    Weight Concern Not Asked    Special Diet Not Asked    Back Care Not Asked    Exercise Not Asked    Bike Helmet Not Asked    Seat Belt Not Asked    Self-Exams Not Asked    Grew up on a farm No    History of tanning Yes    Outdoor occupation No    Pt has a pacemaker No    Pt has a defibrillator No    Breast feeding No    Reaction to local anesthetic No   Social History Narrative    Not on file     Social Drivers of Health     Financial Resource Strain: Not on File (2023)    Received from [facility]    Financial Resource Strain     Financial Resource Strain: 0   Food Insecurity: Not on File (2024)    Received from [facility]    Food Insecurity     Food: 0   Transportation Needs: Not on File (2023)    [facility] from [facility]    Transportation Needs     Transportation: 0   Physical Activity: Not on File (2023)    Received from [facility]    Physical Activity     Physical Activity: 0   Stress: Not on File (2023)    Received from [facility]    Stress     Stress: 0   Social Connections: Not on File (2024)    Received from [facility]    Social Connections     Connectedness: 0   Housing Stability: Not on File (2023)    Received from [facility]    Housing Stability     Housin     Family History   Problem Relation Age of Onset    Cancer Paternal Grandmother         stomach    Gastro-Intestinal Disorder Maternal Grandmother         bleeding ulcer (cause of death)    Asthma Maternal Grandmother     Genetic Disease Maternal Uncle         muscular dystrophy                      HPI :      Chief Complaint   Patient presents with    Derm Problem     LOV 2024 Pt present for hair loss. Pt reports the hair loss began over the past 2 weeks. Pt has bald spots on both sides of  the scalp. Pt isn't using anything for treatment at this time. Pt also reports dry spots on the face.     Follow-up psoriasis.  Has noted in general improvement on Humira currently.  Scalp generally has improved.  Patient's noted increasing hair loss patchy for tiredly over the parietal area.  Patient under more stress recently family numbers visiting.  Notes some itching.  More scaling on the face.  Hair loss came up suddenly has noted some tenderness in these areas.  Nothing else new or different.  Had strep recently, several courses of antibiotics azithromycin and cefdinir recent labs show elevated white count, no anemia eosinophils 0.1% symptoms have improved with antibiotics.  Denies any reaction.  Patient here with daughter  Patient presents with concerns above.    Past notes/ records and appropriate/relevant lab results including pathology and past body maps reviewed. Updated and new information noted in current visit.       ROS:    Denies any other systemic complaints.  No fevers, chills, night sweats, sensitivity to the sun, deeper lumps or bumps.  No other skin complaints.  History, medications, allergies as noted.    Physical examination: Patient  well-developed well-nourished, alert oriented in no acute distress.  Exam of involved, appropriate areas of skin performed, including scalp, head, neck, face,nails, hair, external eyes, including conjunctival mucosa, eyelids, lips, external ears, back, chest, abdomen, arms, legs, palms.  Remarkable for lesions as noted   See map for details  Patchy areas of hair loss bilateral parietal scalp right greater than left.  Erythema and scaling over the glabella nasolabial folds alar creases     ASSESSMENT AND PLAN:     Encounter Diagnoses   Name Primary?    Alopecia areata Yes    Psoriasis vulgaris     Medication monitoring encounter        Assessment / plan:  Acneform lesions have improved.  Continue monitoring.    History of psoriasis.  Improved on Humira currently  still active areas on the face scalp okay   Psoriasis.  Plaque-type.  Currently less than 5% BSA involvement.  Prior to initiating Humira 30-% body surface involvement.  Will treat with medications and grid.  Overall skincare, liberal use of emollients discussed.  Consider more aggressive therapy if worsening.Patient will let us know how they are doing over the next several weeks.  Await clinical response to above therapy.  Recheck in 2-3 months if no improvement.    Widespread lesions especially over the scalp, previously currently clear    Monitor for guttate flare with recent strep.  Continue Humira  Medically necessary given widespread psoriasis worsening, scalp involvement, symptoms interfering with daily function, work, severe itching.    For psoriasis on face.  Will add tacrolimus.  Clobetasol scalp    Will need follow-up in 4 to 6 months, continue quant gold due at least annually      Alopecia areata.  Intralesional Kenalog 10 mg per mL total of 2.0 mL injected to above sites.  At bilateral parietal scalp autoimmune nature possibly occurring with other auto inflammatory conditions including thyroid problems as well as association with atopic conditions, potentially relapsing, recurrent persistent condition discussed.  Pathophysiology reviewed.  See grid for topicals.  May consider minoxidil.  No labs presently consider thyroid function, RACHELE, CBC if persists.  Topicals including newer medications, various forms of topical steroids, systemic medications reviewed.  Followup one month or p.r.n.  Clobetasol twice daily to patchy areas on the scalp, minoxidil solution twice daily  Await response recheck in 6 weeks if needed stress likely factor other labs reviewed consider additional labs if worsening    Pathophysiology discussed with patient.  Therapeutic options reviewed.  See  Medications in grid.  Instructions reviewed at length.     General skin care questions answered.   Reassurance regarding benign skin  lesions.Signs and symptoms of skin cancer, ABCDE's of melanoma briefly reviewed.  Sunscreen use (broad-spectrum, ideally mineral, UVA UVB coverage SPF 30 or greater recommended), sun protection, encouraged.  Followup as noted in rtc or p.r.n.  Encounter Times   Including precharting, reviewing chart, prior notes obtaining history, medical exam, notes on body map, plan, counseling, discussion of therapeutic options, patient education, orders more than half total time spent in face to face time with patient.  My total time spent caring for the patient on the day of the encounter: 35 minutes       The patient indicates understanding of these issues and agrees to the plan.  The patient is asked to return as noted in follow-up /as noted above    This note was generated using Dragon voice recognition software.  Please contact me regarding any confusion resulting from errors in recognition.  Note to patient and family: The  [employer] C[employer] Act makes medical notes like these available to patients. However, be advised this is a medical document. It is intended as [id number] communication and monitoring of a patient's care needs. It is written in medical language and may contain abbreviations or verbiage that are unfamiliar. It may appear blunt or direct. Medical documents are intended to carry relevant information, facts as evident and the clinical opinion of the practitioner.  Orders Placed This Encounter   Procedures    INJECTION INTO SKIN LESIONS       Meds & Refills for this Visit:   Requested Prescriptions     Signed Prescriptions Disp Refills    clobetasol 0.05 % External Solution 50 mL 6     Sig: Use bid  As directed to scalp    Minoxidil 5 % External Solution 60 mL 1     Sig: Apply to areas of hair loss qd    tacrolimus (PROTOPIC) 0.1 % External Ointment 30 g 1     Sig: Use at bedtime to areas of psoriasis on face    hydrOXYzine 10 MG Oral Tab 60 tablet 2     Si po at bedtime x 3 days, increase to 2 po at  bedtime as tolerated       Encounter Diagnoses   Name Primary?    Psoriasis vulgaris Yes    Medication monitoring encounter        Orders Placed This Encounter   Procedures    INJECTION INTO SKIN LESIONS       Results From Past 48 Hours:  No results found for this or any previous visit (from the past 48 hours).    Meds This Visit:      Imaging Orders:  None     Referral Orders:  No orders of the defined types were placed in this encounter.

## 2025-01-07 ENCOUNTER — OFFICE VISIT (OUTPATIENT)
Dept: OBGYN CLINIC | Facility: CLINIC | Age: 35
End: 2025-01-07

## 2025-01-07 VITALS
WEIGHT: 188.31 LBS | DIASTOLIC BLOOD PRESSURE: 64 MMHG | HEIGHT: 63 IN | BODY MASS INDEX: 33.37 KG/M2 | SYSTOLIC BLOOD PRESSURE: 98 MMHG

## 2025-01-07 DIAGNOSIS — R10.2 PELVIC PAIN IN FEMALE: ICD-10-CM

## 2025-01-07 DIAGNOSIS — N80.129 ENDOMETRIOMA: Primary | ICD-10-CM

## 2025-01-07 PROCEDURE — 99214 OFFICE O/P EST MOD 30 MIN: CPT | Performed by: OBSTETRICS & GYNECOLOGY

## 2025-01-07 RX ORDER — ADALIMUMAB 40MG/0.4ML
KIT SUBCUTANEOUS
COMMUNITY
Start: 2024-12-17

## 2025-01-08 ENCOUNTER — ANESTHESIA EVENT (OUTPATIENT)
Dept: SURGERY | Facility: HOSPITAL | Age: 35
End: 2025-01-08
Payer: MEDICAID

## 2025-01-08 ENCOUNTER — HOSPITAL ENCOUNTER (OUTPATIENT)
Facility: HOSPITAL | Age: 35
Setting detail: HOSPITAL OUTPATIENT SURGERY
Discharge: HOME | End: 2025-01-08
Attending: OBSTETRICS & GYNECOLOGY | Admitting: OBSTETRICS & GYNECOLOGY
Payer: MEDICAID

## 2025-01-08 ENCOUNTER — ANESTHESIA (OUTPATIENT)
Dept: SURGERY | Facility: HOSPITAL | Age: 35
End: 2025-01-08
Payer: MEDICAID

## 2025-01-08 VITALS
TEMPERATURE: 98 F | HEART RATE: 71 BPM | SYSTOLIC BLOOD PRESSURE: 117 MMHG | DIASTOLIC BLOOD PRESSURE: 64 MMHG | OXYGEN SATURATION: 96 % | RESPIRATION RATE: 16 BRPM | HEIGHT: 63 IN | WEIGHT: 185 LBS | BODY MASS INDEX: 32.78 KG/M2

## 2025-01-08 DIAGNOSIS — R10.2 PELVIC PAIN: ICD-10-CM

## 2025-01-08 DIAGNOSIS — N80.129 ENDOMETRIOMA: ICD-10-CM

## 2025-01-08 PROBLEM — N80.C0: Status: ACTIVE | Noted: 2024-12-12

## 2025-01-08 LAB — B-HCG UR QL: NEGATIVE

## 2025-01-08 PROCEDURE — 0UB24ZZ EXCISION OF BILATERAL OVARIES, PERCUTANEOUS ENDOSCOPIC APPROACH: ICD-10-PCS | Performed by: OBSTETRICS & GYNECOLOGY

## 2025-01-08 PROCEDURE — 58662 LAPAROSCOPY EXCISE LESIONS: CPT | Performed by: OBSTETRICS & GYNECOLOGY

## 2025-01-08 PROCEDURE — 8E0W4CZ ROBOTIC ASSISTED PROCEDURE OF TRUNK REGION, PERCUTANEOUS ENDOSCOPIC APPROACH: ICD-10-PCS | Performed by: OBSTETRICS & GYNECOLOGY

## 2025-01-08 RX ORDER — DEXAMETHASONE SODIUM PHOSPHATE 4 MG/ML
VIAL (ML) INJECTION AS NEEDED
Status: DISCONTINUED | OUTPATIENT
Start: 2025-01-08 | End: 2025-01-08 | Stop reason: SURG

## 2025-01-08 RX ORDER — PHENYLEPHRINE HCL 10 MG/ML
VIAL (ML) INJECTION AS NEEDED
Status: DISCONTINUED | OUTPATIENT
Start: 2025-01-08 | End: 2025-01-08 | Stop reason: SURG

## 2025-01-08 RX ORDER — SODIUM CHLORIDE, SODIUM LACTATE, POTASSIUM CHLORIDE, CALCIUM CHLORIDE 600; 310; 30; 20 MG/100ML; MG/100ML; MG/100ML; MG/100ML
INJECTION, SOLUTION INTRAVENOUS CONTINUOUS
Status: DISCONTINUED | OUTPATIENT
Start: 2025-01-08 | End: 2025-01-08

## 2025-01-08 RX ORDER — ONDANSETRON 2 MG/ML
INJECTION INTRAMUSCULAR; INTRAVENOUS AS NEEDED
Status: DISCONTINUED | OUTPATIENT
Start: 2025-01-08 | End: 2025-01-08 | Stop reason: SURG

## 2025-01-08 RX ORDER — KETOROLAC TROMETHAMINE 30 MG/ML
30 INJECTION, SOLUTION INTRAMUSCULAR; INTRAVENOUS ONCE
Status: COMPLETED | OUTPATIENT
Start: 2025-01-08 | End: 2025-01-08

## 2025-01-08 RX ORDER — MORPHINE SULFATE 10 MG/ML
6 INJECTION, SOLUTION INTRAMUSCULAR; INTRAVENOUS EVERY 10 MIN PRN
Status: DISCONTINUED | OUTPATIENT
Start: 2025-01-08 | End: 2025-01-08

## 2025-01-08 RX ORDER — FAMOTIDINE 10 MG/ML
20 INJECTION, SOLUTION INTRAVENOUS ONCE
Status: COMPLETED | OUTPATIENT
Start: 2025-01-08 | End: 2025-01-08

## 2025-01-08 RX ORDER — METOCLOPRAMIDE HYDROCHLORIDE 5 MG/ML
10 INJECTION INTRAMUSCULAR; INTRAVENOUS ONCE
Status: COMPLETED | OUTPATIENT
Start: 2025-01-08 | End: 2025-01-08

## 2025-01-08 RX ORDER — ACETAMINOPHEN 500 MG
1000 TABLET ORAL ONCE
Status: COMPLETED | OUTPATIENT
Start: 2025-01-08 | End: 2025-01-08

## 2025-01-08 RX ORDER — HYDROMORPHONE HYDROCHLORIDE 1 MG/ML
0.6 INJECTION, SOLUTION INTRAMUSCULAR; INTRAVENOUS; SUBCUTANEOUS EVERY 5 MIN PRN
Status: DISCONTINUED | OUTPATIENT
Start: 2025-01-08 | End: 2025-01-08

## 2025-01-08 RX ORDER — MIDAZOLAM HYDROCHLORIDE 1 MG/ML
INJECTION INTRAMUSCULAR; INTRAVENOUS AS NEEDED
Status: DISCONTINUED | OUTPATIENT
Start: 2025-01-08 | End: 2025-01-08 | Stop reason: SURG

## 2025-01-08 RX ORDER — MAGNESIUM SULFATE HEPTAHYDRATE 500 MG/ML
INJECTION, SOLUTION INTRAMUSCULAR; INTRAVENOUS AS NEEDED
Status: DISCONTINUED | OUTPATIENT
Start: 2025-01-08 | End: 2025-01-08 | Stop reason: SURG

## 2025-01-08 RX ORDER — METOCLOPRAMIDE 10 MG/1
10 TABLET ORAL ONCE
Status: COMPLETED | OUTPATIENT
Start: 2025-01-08 | End: 2025-01-08

## 2025-01-08 RX ORDER — MORPHINE SULFATE 4 MG/ML
2 INJECTION, SOLUTION INTRAMUSCULAR; INTRAVENOUS EVERY 10 MIN PRN
Status: DISCONTINUED | OUTPATIENT
Start: 2025-01-08 | End: 2025-01-08

## 2025-01-08 RX ORDER — HYDROMORPHONE HYDROCHLORIDE 1 MG/ML
0.4 INJECTION, SOLUTION INTRAMUSCULAR; INTRAVENOUS; SUBCUTANEOUS EVERY 5 MIN PRN
Status: DISCONTINUED | OUTPATIENT
Start: 2025-01-08 | End: 2025-01-08

## 2025-01-08 RX ORDER — HYDROMORPHONE HYDROCHLORIDE 1 MG/ML
0.2 INJECTION, SOLUTION INTRAMUSCULAR; INTRAVENOUS; SUBCUTANEOUS EVERY 5 MIN PRN
Status: DISCONTINUED | OUTPATIENT
Start: 2025-01-08 | End: 2025-01-08

## 2025-01-08 RX ORDER — LIDOCAINE HYDROCHLORIDE 10 MG/ML
INJECTION, SOLUTION EPIDURAL; INFILTRATION; INTRACAUDAL; PERINEURAL AS NEEDED
Status: DISCONTINUED | OUTPATIENT
Start: 2025-01-08 | End: 2025-01-08 | Stop reason: SURG

## 2025-01-08 RX ORDER — MORPHINE SULFATE 4 MG/ML
4 INJECTION, SOLUTION INTRAMUSCULAR; INTRAVENOUS EVERY 10 MIN PRN
Status: DISCONTINUED | OUTPATIENT
Start: 2025-01-08 | End: 2025-01-08

## 2025-01-08 RX ORDER — ROCURONIUM BROMIDE 10 MG/ML
INJECTION, SOLUTION INTRAVENOUS AS NEEDED
Status: DISCONTINUED | OUTPATIENT
Start: 2025-01-08 | End: 2025-01-08 | Stop reason: SURG

## 2025-01-08 RX ORDER — NALOXONE HYDROCHLORIDE 0.4 MG/ML
0.08 INJECTION, SOLUTION INTRAMUSCULAR; INTRAVENOUS; SUBCUTANEOUS AS NEEDED
Status: DISCONTINUED | OUTPATIENT
Start: 2025-01-08 | End: 2025-01-08

## 2025-01-08 RX ORDER — BUPIVACAINE HYDROCHLORIDE AND EPINEPHRINE 2.5; 5 MG/ML; UG/ML
INJECTION, SOLUTION INFILTRATION; PERINEURAL AS NEEDED
Status: DISCONTINUED | OUTPATIENT
Start: 2025-01-08 | End: 2025-01-08 | Stop reason: HOSPADM

## 2025-01-08 RX ORDER — FAMOTIDINE 20 MG/1
20 TABLET, FILM COATED ORAL ONCE
Status: COMPLETED | OUTPATIENT
Start: 2025-01-08 | End: 2025-01-08

## 2025-01-08 RX ADMIN — DEXAMETHASONE SODIUM PHOSPHATE 8 MG: 4 MG/ML VIAL (ML) INJECTION at 07:45:00

## 2025-01-08 RX ADMIN — LIDOCAINE HYDROCHLORIDE 50 MG: 10 INJECTION, SOLUTION EPIDURAL; INFILTRATION; INTRACAUDAL; PERINEURAL at 07:33:00

## 2025-01-08 RX ADMIN — ROCURONIUM BROMIDE 40 MG: 10 INJECTION, SOLUTION INTRAVENOUS at 07:38:00

## 2025-01-08 RX ADMIN — MIDAZOLAM HYDROCHLORIDE 2 MG: 1 INJECTION INTRAMUSCULAR; INTRAVENOUS at 07:30:00

## 2025-01-08 RX ADMIN — PHENYLEPHRINE HCL 50 MCG: 10 MG/ML VIAL (ML) INJECTION at 07:50:00

## 2025-01-08 RX ADMIN — SODIUM CHLORIDE, SODIUM LACTATE, POTASSIUM CHLORIDE, CALCIUM CHLORIDE: 600; 310; 30; 20 INJECTION, SOLUTION INTRAVENOUS at 08:14:00

## 2025-01-08 RX ADMIN — ONDANSETRON 4 MG: 2 INJECTION INTRAMUSCULAR; INTRAVENOUS at 07:32:00

## 2025-01-08 RX ADMIN — MAGNESIUM SULFATE HEPTAHYDRATE 2 G: 500 INJECTION, SOLUTION INTRAMUSCULAR; INTRAVENOUS at 07:33:00

## 2025-01-08 RX ADMIN — SODIUM CHLORIDE, SODIUM LACTATE, POTASSIUM CHLORIDE, CALCIUM CHLORIDE: 600; 310; 30; 20 INJECTION, SOLUTION INTRAVENOUS at 07:30:00

## 2025-01-08 NOTE — BRIEF OP NOTE
Pre-Operative Diagnosis: Endometrioma [N80.129]  Pelvic pain [R10.2]     Post-Operative Diagnosis: Endometrioma [N80.129]Pelvic pain [R10.2]      Procedure Performed:   Xi robot-assisted laparoscopic left ovarian cystectomy with ablation of endometriosis    Surgeons and Role:     * Darryl Norman MD - Primary    Assistant(s):  Surgical Assistant.: Rah Burton     Surgical Findings: left ovarian endometrioma excised  Fulguration of endometriosis of right ovary and of left ovary     Specimen: left ovarian cyst     Estimated Blood Loss: No data recorded    Dictation Number:  pending    Darryl Norman MD  1/8/2025  9:49 AM

## 2025-01-08 NOTE — ANESTHESIA POSTPROCEDURE EVALUATION
Patient: Anette Perez    Procedure Summary       Date: 01/08/25 Room / Location: [facility] MAIN OR  / [facility] MAIN OR    Anesthesia Start: 0730 Anesthesia Stop: 0854    Procedure: Xi robot-assisted laparoscopic left ovarian cystectomy with ablation of endometriosis (Bilateral) Diagnosis:       Endometrioma      Pelvic pain      (Endometrioma [N80.129]Pelvic pain [R10.2])    Surgeons: Darryl Norman MD Anesthesiologist: Olya Chapman MD    Anesthesia Type: general ASA Status: 2            Anesthesia Type: general    Vitals Value Taken Time   /56 01/08/25 0853   Temp 98.2 °F (36.8 °C) 01/08/25 0852   Pulse 76 01/08/25 0853   Resp 30 01/08/25 0853   SpO2 95 % 01/08/25 0853   Vitals shown include unfiled device data.    [facility] AN Post Evaluation:   Patient Evaluated in PACU  Patient Participation: complete - patient participated  Level of Consciousness: responsive to verbal stimuli  Pain Score: 0  Pain Management: adequate  Airway Patency:patent  Dental exam unchanged from preop  Yes    Nausea/Vomiting: none  Cardiovascular Status: acceptable  Respiratory Status: acceptable  Postoperative Hydration acceptable      Janna Chang CRNA  1/8/2025 8:54 AM

## 2025-01-08 NOTE — ANESTHESIA PREPROCEDURE EVALUATION
Anesthesia PreOp Note    HPI:     Anette Perez is a 34 year old female who presents for preoperative consultation requested by: Darryl Norman MD    Date of Surgery: 2025    Procedure(s):  Xi robot-assisted laparoscopic right/left ovarian cystectomy, right/left salpingo-oophorectomy with possible ablation of endometriosis  Indication: Endometrioma [N80.129]  Pelvic pain [R10.2]    Relevant Problems   No relevant active problems       NPO:  Last Liquid Consumption Date: 25  Last Liquid Consumption Time:   Last Solid Consumption Date: 25  Last Solid Consumption Time:   Last Liquid Consumption Date: 25          History Review:  Patient Active Problem List    Diagnosis Date Noted    Endometrioma 2024    Pelvic pain 2024    Well adult exam 2023    Immunization due 2023    Obesity (BMI 30.0-34.9) 2023    Hypercholesteremia 2023    Neck pain 2023    Tingling of both upper extremities 2023    Cervical radiculopathy 2023    Yeast vaginitis 11/10/2020    Breast pain 07/10/2020    Lump or mass in breast 07/10/2020    Subserous leiomyoma of uterus 2020    Keratosis pilaris 2019    Multiple skin nodules 2019    It band syndrome, right 2019    Psoriasis 2018    Depression 2016    Plantar fascial fibromatosis 2014    Pain in joint, pelvic region and thigh 2014    Mild dysplasia of cervix (KELVIN I) 2013    Dysuria 2013    Dyspnea and respiratory abnormality 2012    Pain in thoracic spine 2012    Mild intermittent asthma with acute exacerbation (HCC) 2012    Conjunctivitis 10/21/2010    Absence of menstruation 2010    Vaginitis and vulvovaginitis 2010    Multiple sites, insect bite, nonvenomous 2009       Past Medical History:    Asthma (HCC)    Decorative tattoo    Esophageal reflux    Hyperlipidemia     (normal spontaneous vaginal  delivery) (HCC)     (normal spontaneous vaginal delivery) (HCC)     (normal spontaneous vaginal delivery) (HCC)    Psoriasis    Visual impairment    glasses       Past Surgical History:   Procedure Laterality Date    Hemorrhoidectomy  2019    L anterior midline    Tubal ligation         Prescriptions Prior to Admission[1]  Current Medications and Prescriptions Ordered in Epic[2]    Allergies[3]    Family History   Problem Relation Age of Onset    Cancer Paternal Grandmother         stomach    Gastro-Intestinal Disorder Maternal Grandmother         bleeding ulcer (cause of death)    Asthma Maternal Grandmother     Genetic Disease Maternal Uncle         muscular dystrophy     Social History     Socioeconomic History    Marital status:     Number of children: 3   Occupational History    Occupation: [employer]     Employer: [employer]    Tobacco Use    Smoking status: Former     Current packs/day: 0.00     Types: Cigarettes     Quit date: 2016     Years since quittin.7     Passive exposure: Never    Smokeless tobacco: Never   Vaping Use    Vaping status: Never Used   Substance and Sexual Activity    Alcohol use: No     Alcohol/week: 0.0 standard drinks of alcohol    Drug use: No    Sexual activity: Yes     Birth control/protection: Condom   Other Topics Concern    Caffeine Concern Yes     Comment: coffee,soda    Grew up on a farm No    History of tanning Yes    Outdoor occupation No    Pt has a pacemaker No    Pt has a defibrillator No    Breast feeding No    Reaction to local anesthetic No       Available pre-op labs reviewed.  Lab Results   Component Value Date    WBC 26.0 (H) 2024    RBC 4.55 2024    HGB 13.5 2024    HCT 38.9 2024    MCV 85.5 2024    MCH 29.7 2024    MCHC 34.7 2024    RDW 11.8 2024    .0 2024    URINEPREG Negative 2025     Lab Results   Component Value Date     2024    K 3.9  12/27/2024     12/27/2024    CO2 21.0 12/27/2024    BUN 11 12/27/2024    CREATSERUM 0.77 12/27/2024     (H) 12/27/2024    CA 9.4 12/27/2024          Vital Signs:  Body mass index is 32.77 kg/m².   height is 1.6 m (5' 3\") and weight is 83.9 kg (185 lb). Her oral temperature is 97.9 °F (36.6 °C). Her blood pressure is 119/65 and her pulse is 52. Her respiration is 16 and oxygen saturation is 98%.   Vitals:    01/02/25 1053 01/08/25 0634   BP:  119/65   Pulse:  52   Resp:  16   Temp:  97.9 °F (36.6 °C)   TempSrc:  Oral   SpO2:  98%   Weight: 81.6 kg (180 lb) 83.9 kg (185 lb)   Height: 1.6 m (5' 3\")         Anesthesia Evaluation      Airway   Mallampati: II  TM distance: >3 FB  Neck ROM: full  Dental - Dentition appears grossly intact     Pulmonary - normal exam   (+) asthma  (-) sleep apnea  Cardiovascular - normal exam  (-) hypertension, dysrhythmias    Neuro/Psych    (+)  neuromuscular disease,        GI/Hepatic/Renal    (+) GERD    Endo/Other    (-) diabetes mellitus, arthritis  Abdominal                  Anesthesia Plan:   ASA:  2  Plan:   General  Airway:  ETT  Post-op Pain Management: IV analgesics and Oral pain medication  Informed Consent Plan and Risks Discussed With:  Patient  Discussed plan with:  CRNA      I have informed Anette Perez and/or legal guardian or family member of the nature of the anesthetic plan, benefits, risks including possible dental damage if relevant, major complications, and any alternative forms of anesthetic management.   All of the patient's questions were answered to the best of my ability. The patient desires the anesthetic management as planned.  Olya Chapman MD  1/8/2025 7:14 AM  Present on Admission:  **None**           [1]   Facility-Administered Medications Prior to Admission   Medication Dose Route Frequency Provider Last Rate Last Admin    triamcinolone acetonide (Kenalog-10) 10 mg/mL injection  20 mg Intralesional Once Tosha Alejandro MD          Medications Prior to Admission   Medication Sig Dispense Refill Last Dose/Taking    HUMIRA, 2 PEN, 40 MG/0.4ML Subcutaneous Auto-injector Kit    Past Week    [] cefdinir 300 MG Oral Cap Take 1 capsule (300 mg total) by mouth 2 (two) times daily for 10 days. 20 capsule 0 Taking    Adalimumab (HUMIRA, 2 PEN,) 40 MG/0.4ML Subcutaneous Pen-injector Kit Inject 40 mg into the skin every 14 (fourteen) days. Maintenance dose 2 each 5 Past Week    clobetasol 0.05 % External Solution Use bid  As directed to scalp 50 mL 6 Unknown    Minoxidil 5 % External Solution Apply to areas of hair loss qd (Patient not taking: Reported on 2025) 60 mL 1 Unknown    tacrolimus (PROTOPIC) 0.1 % External Ointment Use at bedtime to areas of psoriasis on face (Patient not taking: Reported on 2025) 30 g 1 Unknown    hydrOXYzine 10 MG Oral Tab 1 po at bedtime x 3 days, increase to 2 po at bedtime as tolerated (Patient not taking: Reported on 2025) 60 tablet 2 Unknown    omeprazole 20 MG Oral Capsule Delayed Release Take 1 capsule (20 mg total) by mouth every morning before breakfast. (Patient not taking: Reported on 2025) 90 capsule 3 More than a month    Fluocinolone Acetonide Scalp (DERMA-SMOOTHE/FS SCALP) 0.01 % External Oil Use qhs as directed for scalp psoriasis 118 mL 12 Unknown    fluocinonide 0.05 % External Cream Use twice daily on affected areas (Patient not taking: Reported on 2025) 60 g 3 Unknown   [2]   Current Facility-Administered Medications Ordered in Epic   Medication Dose Route Frequency Provider Last Rate Last Admin    lactated ringers infusion   Intravenous Continuous Darryl Norman MD 20 mL/hr at 25 0632 New Bag at 25 0632    ceFAZolin (Ancef) 2g in 10mL IV syringe premix  2 g Intravenous Once Darryl Norman MD         No current [facility]-ordered outpatient medications on file.   [3] No Known Allergies

## 2025-01-08 NOTE — ADDENDUM NOTE
Addendum  created 01/08/25 1100 by Janna Chang CRNA    Intraprocedure Meds edited, Orders acknowledged in Narrator

## 2025-01-08 NOTE — ANESTHESIA PROCEDURE NOTES
Airway  Date/Time: 1/8/2025 7:40 AM  Urgency: Elective      General Information and Staff    Patient location during procedure: OR  Anesthesiologist: Olya Chapman MD  Resident/CRNA: Janna Chang CRNA  Performed: CRNA   Performed by: Janna Chang CRNA  Authorized by: Olya Chapman MD      Indications and Patient Condition  Indications for airway management: anesthesia  Sedation level: deep  Preoxygenated: yes  Patient position: sniffing  Mask difficulty assessment: 1 - vent by mask    Final Airway Details  Final airway type: endotracheal airway      Successful airway: ETT  Cuffed: yes   Successful intubation technique: direct laryngoscopy  Endotracheal tube insertion site: oral  Blade: Rodas  Blade size: #2  ETT size (mm): 7.0    Cormack-Lehane Classification: grade I - full view of glottis  Placement verified by: capnometry   Measured from: teeth  ETT to teeth (cm): 22  Number of attempts at approach: 1

## 2025-01-08 NOTE — H&P
HPI:   Anette Perez is a 34 year old female who presents for a hx of possible endometrioma on ultrasound, hx of dyspareunia and severe dysmenorrhea/pelvic pain, r/o endometriosis.  Pt counseled extensively and pt requested davinci operative laparoscopy /possible ovarian cystectomy/possible right/left oophorectomy/possible ablation of endometriosis.   Pt was counseled again today on the risks/benefits/alternatives of this procedure including the risks of bleeding/infection/damage to internal organs including bowel/bladder/uterus/ovaries/tubes/cervix/vagina/ureters/blood vessels/among others/hemorrhage and blood tranfusions/thromboembolic dz such as dvt/pe/mi/stroke/among other risks/and the pt voiced her understanding and all questions answered. Pt counseled on possible need for further treatment including medical management of endometriosis.      Wt Readings from Last 6 Encounters:   25 185 lb (83.9 kg)   25 188 lb 4.8 oz (85.4 kg)   24 186 lb (84.4 kg)   24 180 lb (81.6 kg)   24 180 lb (81.6 kg)   03/15/24 185 lb (83.9 kg)     Body mass index is 32.77 kg/m².    Cholesterol, Total (mg/dL)   Date Value   2024 160   2023 215 (H)   2023 245 (H)     HDL Cholesterol (mg/dL)   Date Value   2024 41   2023 39 (L)   2023 42     LDL Cholesterol (mg/dL)   Date Value   2024 101 (H)   2023 145 (H)   2023 170 (H)     AST (U/L)   Date Value   2024 16   2024 22   2023 19     ALT (U/L)   Date Value   2024 21   2024 27   2023 34        No current outpatient medications on file.      Past Medical History:    Asthma (HCC)    Decorative tattoo    Esophageal reflux    Hyperlipidemia     (normal spontaneous vaginal delivery) (HCC)     (normal spontaneous vaginal delivery) (HCC)     (normal spontaneous vaginal delivery) (HCC)    Psoriasis    Visual impairment    glasses      Past Surgical History:    Procedure Laterality Date    Hemorrhoidectomy  2019    L anterior midline    Tubal ligation        Family History   Problem Relation Age of Onset    Cancer Paternal Grandmother         stomach    Gastro-Intestinal Disorder Maternal Grandmother         bleeding ulcer (cause of death)    Asthma Maternal Grandmother     Genetic Disease Maternal Uncle         muscular dystrophy      Social History:   Social History     Socioeconomic History    Marital status:     Number of children: 3   Occupational History    Occupation: [employer]     Employer: [employer]    Tobacco Use    Smoking status: Former     Current packs/day: 0.00     Types: Cigarettes     Quit date: 2016     Years since quittin.7     Passive exposure: Never    Smokeless tobacco: Never   Vaping Use    Vaping status: Never Used   Substance and Sexual Activity    Alcohol use: No     Alcohol/week: 0.0 standard drinks of alcohol    Drug use: No    Sexual activity: Yes     Birth control/protection: Condom   Other Topics Concern    Caffeine Concern Yes     Comment: coffee,soda    Grew up on a farm No    History of tanning Yes    Outdoor occupation No    Pt has a pacemaker No    Pt has a defibrillator No    Breast feeding No    Reaction to local anesthetic No     Social Drivers of Health     Financial Resource Strain: Not on File (2023)    [facility] from [facility]    Financial Resource Strain     Financial Resource Strain: 0   Food Insecurity: Not on File (2024)    Received from [facility]    Food Insecurity     Food: 0   Transportation Needs: Not on File (2023)    [facility] from [facility]    Transportation Needs     Transportation: 0   Physical Activity: Not on File (2023)    [facility] from [facility]    Physical Activity     Physical Activity: 0   Stress: Not on File (2023)    Received from [facility]    Stress     Stress: 0   Social Connections: Not on File (2024)    Received from [facility]    Social  Connections     Connectedness: 0   Housing Stability: Not on File (2023)    [facility] from [facility]    Housing Stability     Housin            REVIEW OF SYSTEMS:   GENERAL: feels well otherwise  SKIN: denies any unusual skin lesions  EYES:denies blurred vision or double vision  HEENT: denies nasal congestion, sinus pain or ST  LUNGS: denies shortness of breath with exertion  CARDIOVASCULAR: denies chest pain on exertion  GI: denies abdominal pain,denies heartburn  : denies dysuria, vaginal discharge or itching,periods regular   MUSCULOSKELETAL: denies back pain  NEURO: denies headaches  PSYCHE: denies depression or anxiety  HEMATOLOGIC: denies hx of anemia  ENDOCRINE: denies thyroid history  ALL/ASTHMA: denies hx of allergy or asthma    EXAM:   /65 (BP Location: Right arm)   Pulse 52   Temp 97.9 °F (36.6 °C) (Oral)   Resp 16   Ht 5' 3\" (1.6 m)   Wt 185 lb (83.9 kg)   LMP 2024   SpO2 98%   BMI 32.77 kg/m²   Body mass index is 32.77 kg/m².   GENERAL: well developed, well nourished,in no apparent distress  SKIN: no rashes,no suspicious lesions  HEENT: atraumatic, normocephalic  EYES:normal in appearance  NECK: supple,no adenopathy  CHEST: no chest tenderness  LUNGS: clear to auscultation  CARDIO: RRR without murmur  GI: good BS's,no masses, HSM or tenderness  MUSCULOSKELETAL: back is not tender,FROM of the back  EXTREMITIES: no cyanosis, clubbing or edema  NEURO: Oriented times three      ASSESSMENT AND PLAN:   Anette Perez is a 34 year old female who presents for a . hx of possible endometrioma on ultrasound, hx of dyspareunia and severe dysmenorrhea/pelvic pain, r/o endometriosis.  Pt counseled extensively and pt requested davinci operative laparoscopy /possible ovarian cystectomy/possible right/left oophorectomy/possible ablation of endometriosis.   Pt was counseled again today on the risks/benefits/alternatives of this procedure including the risks of  bleeding/infection/damage to internal organs including bowel/bladder/uterus/ovaries/tubes/cervix/vagina/ureters/blood vessels/among others/hemorrhage and blood tranfusions/thromboembolic dz such as dvt/pe/mi/stroke/among other risks/and the pt voiced her understanding and all questions answered. Pt counseled on possible need for further treatment including medical management of endometriosis.

## 2025-01-15 ENCOUNTER — TELEPHONE (OUTPATIENT)
Dept: OBGYN CLINIC | Facility: CLINIC | Age: 35
End: 2025-01-15

## 2025-01-15 NOTE — TELEPHONE ENCOUNTER
Pt informed of result and Lupron recommendation. Pt already scheduled for 1/22. Verbalized understanding and agreement of plan. No further questions at this time.

## 2025-01-15 NOTE — TELEPHONE ENCOUNTER
----- Message from Darryl Norman sent at 1/13/2025 12:52 PM CST -----  Endometriosis confirmed on pathology.  Please schedule f/u in office 1-2 weeks to schedule her Lupron therapy for 3-6 months.

## 2025-01-19 NOTE — PROGRESS NOTES
HPI:   Anette Perez is a 34 year old female who presents for a preop visit for prob endometrioma/hx of pelvic pain and hx of dysmenorrhea,  hx of prob endometriosis. Pt was counseled again today on the risks/benefits/alternatives of a davinci operative laparoscopy/excision of ovarian cyst/endometrioma/possible ablation of endometriosis,  including the risks of bleeding/infection/damage to internal organs including bowel/bladder/uterus/ovaries/tubes/cervix/vagina/ureters/blood vessels/among others/hemorrhage and blood tranfusions/thromboembolic dz such as dvt/pe/mi/stroke/among other risks/and the pt voiced her understanding and all questions answered.     Wt Readings from Last 6 Encounters:   01/08/25 185 lb (83.9 kg)   01/07/25 188 lb 4.8 oz (85.4 kg)   12/27/24 186 lb (84.4 kg)   12/16/24 180 lb (81.6 kg)   05/18/24 180 lb (81.6 kg)   03/15/24 185 lb (83.9 kg)     Body mass index is 33.36 kg/m².    Cholesterol, Total (mg/dL)   Date Value   03/09/2024 160   12/06/2023 215 (H)   08/05/2023 245 (H)     HDL Cholesterol (mg/dL)   Date Value   03/09/2024 41   12/06/2023 39 (L)   08/05/2023 42     LDL Cholesterol (mg/dL)   Date Value   03/09/2024 101 (H)   12/06/2023 145 (H)   08/05/2023 170 (H)     AST (U/L)   Date Value   12/27/2024 16   03/09/2024 22   08/05/2023 19     ALT (U/L)   Date Value   12/27/2024 21   03/09/2024 27   08/05/2023 34        Current Outpatient Medications   Medication Sig Dispense Refill    HUMIRA, 2 PEN, 40 MG/0.4ML Subcutaneous Auto-injector Kit       clobetasol 0.05 % External Solution Use bid  As directed to scalp 50 mL 6    Adalimumab (HUMIRA, 2 PEN,) 40 MG/0.4ML Subcutaneous Pen-injector Kit Inject 40 mg into the skin every 14 (fourteen) days. Maintenance dose 2 each 5    Minoxidil 5 % External Solution Apply to areas of hair loss qd (Patient not taking: Reported on 1/7/2025) 60 mL 1    tacrolimus (PROTOPIC) 0.1 % External Ointment Use at bedtime to areas of psoriasis on face  (Patient not taking: Reported on 2025) 30 g 1    omeprazole 20 MG Oral Capsule Delayed Release Take 1 capsule (20 mg total) by mouth every morning before breakfast. (Patient not taking: Reported on 2025) 90 capsule 3    Fluocinolone Acetonide Scalp (DERMA-SMOOTHE/FS SCALP) 0.01 % External Oil Use qhs as directed for scalp psoriasis 118 mL 12      Past Medical History:    Asthma (HCC)    Decorative tattoo    Esophageal reflux    Hyperlipidemia     (normal spontaneous vaginal delivery) (HCC)     (normal spontaneous vaginal delivery) (HCC)     (normal spontaneous vaginal delivery) (HCC)    Psoriasis    Visual impairment    glasses      Past Surgical History:   Procedure Laterality Date    Hemorrhoidectomy  2019    L anterior midline    Tubal ligation        Family History   Problem Relation Age of Onset    Cancer Paternal Grandmother         stomach    Gastro-Intestinal Disorder Maternal Grandmother         bleeding ulcer (cause of death)    Asthma Maternal Grandmother     Genetic Disease Maternal Uncle         muscular dystrophy      Social History:   Social History     Socioeconomic History    Marital status:     Number of children: 3   Occupational History    Occupation: [employer]     Employer: [employer]    Tobacco Use    Smoking status: Former     Current packs/day: 0.00     Types: Cigarettes     Quit date: 2016     Years since quittin.7     Passive exposure: Never    Smokeless tobacco: Never   Vaping Use    Vaping status: Never Used   Substance and Sexual Activity    Alcohol use: No     Alcohol/week: 0.0 standard drinks of alcohol    Drug use: No    Sexual activity: Yes     Birth control/protection: Condom   Other Topics Concern    Caffeine Concern Yes     Comment: coffee,soda    Grew up on a farm No    History of tanning Yes    Outdoor occupation No    Pt has a pacemaker No    Pt has a defibrillator No    Breast feeding No    Reaction to local anesthetic No      Social Drivers of Health     Financial Resource Strain: Not on File (2023)    [facility] from [facility]    Financial Resource Strain     Financial Resource Strain: 0   Food Insecurity: Not on File (2024)    Received from [facility]    Food Insecurity     Food: 0   Transportation Needs: Not on File (2023)    [facility] from [facility]    Transportation Needs     Transportation: 0   Physical Activity: Not on File (2023)    [facility] from [facility]    Physical Activity     Physical Activity: 0   Stress: Not on File (2023)    Received from [facility]    Stress     Stress: 0   Social Connections: Not on File (2024)    Received from [facility]    Social Connections     Connectedness: 0   Housing Stability: Not on File (2023)    Received from [facility]    Housing Stability     Housin            REVIEW OF SYSTEMS:   GENERAL: feels well otherwise  SKIN: denies any unusual skin lesions  EYES:denies blurred vision or double vision  HEENT: denies nasal congestion, sinus pain or ST  LUNGS: denies shortness of breath with exertion  CARDIOVASCULAR: denies chest pain on exertion  GI: denies abdominal pain,denies heartburn  : denies dysuria, vaginal discharge or itching,periods regular   MUSCULOSKELETAL: denies back pain  NEURO: denies headaches  PSYCHE: denies depression or anxiety  HEMATOLOGIC: denies hx of anemia  ENDOCRINE: denies thyroid history  ALL/ASTHMA: denies hx of allergy or asthma    EXAM:   BP 98/64   Ht 5' 3\" (1.6 m)   Wt 188 lb 4.8 oz (85.4 kg)   LMP 2024 (Exact Date)   BMI 33.36 kg/m²   Body mass index is 33.36 kg/m².   GENERAL: well developed, well nourished,in no apparent distress  SKIN: no rashes,no suspicious lesions  HEENT: atraumatic, normocephalic  EYES:normal in appearance  NECK: supple,no adenopathy  CHEST: no chest tenderness  LUNGS: clear to auscultation  CARDIO: RRR without murmur  GI: good BS's,no masses, HSM or tenderness  MUSCULOSKELETAL: back is  not tender,FROM of the back  EXTREMITIES: no cyanosis, clubbing or edema  NEURO: Oriented times three    Narrative   PROCEDURE:  CT SOFT TISSUE OF NECK(CONTRAST ONLY) (CPT=70491)     COMPARISON:  None.     INDICATIONS:  HA, body aches, chills, +strep and sent home with antibx.     TECHNIQUE:  IV contrast-enhanced multislice CT scanning is performed through the neck soft tissues during administration of nonionic contrast.  Dose reduction techniques were used. Dose information is transmitted to the ACR (American College of  Radiology) NRDR (National Radiology Data Registry) which includes the Dose Index Registry.     PATIENT STATED HISTORY:(As transcribed by Technologist)  Patient states streph throat for eleven days.  Patient on antibiotic with no relief.  Body aches and chills      CONTRAST USED:  50cc of Isovue 370     FINDINGS:  NASOPHARYNX:  Fossae of Rosenmuller and torus tubarius are symmetric.    ORAL CAVITY:  No visible mass.    OROPHARYNX:  Prominence of the bilateral palatine tonsils may be seen with tonsillitis.  There is no abscess.  HYPOPHARYNX:  No mass or other visible lesion.    LARYNX:  The vocal cords are symmetric and without mass.    SINUSES:  Limited views show no significant fluid or mucosal thickening.    NECK GLANDS:  The parotid, submandibular, and thyroid glands are unremarkable.    LYMPH NODES:  No pathological-appearing or enlarged lymph nodes.    SKULL BASE:  Foramina are symmetric without bony erosion.    VASCULATURE:  Limited views are unremarkable.    BONES:  No significant osseous lesions.    OTHER:  Nodular opacity in the is concerning infiltrate.                     Impression   CONCLUSION:    1. Prominence of bilateral palatine tonsils may be seen tonsillitis.  There is no evidence of abscess.    2. Nodular opacity in the left upper lobe is concerning for infiltrate.        LOCATION:  [facility]             Narrative   PROCEDURE:  MRI PELVIS (BONY)(W+WO)(CPT=72197)     COMPARISON:   None.     INDICATIONS:  N83.299 Complex ovarian cyst     TECHNIQUE:  A variety of imaging planes and parameters were utilized for visualization of suspected pathology.  Images were performed with and without intravenous gadolinium contrast.     PATIENT STATED HISTORY:(As transcribed by Technologist)  Patient states she is having a MRI to follow up on previous ultrasound      CONTRAST USED:  18 mL of Dotarem     FINDINGS:          The uterus is anteverted in configuration measuring approximately 9 x 5 x 5 cm.  Arcuate uterine morphology noted.  The endometrium maintains normal homogeneous signal with thickness of 1.4 cm, likely reflecting secretory phase of menstrual cycle.    Multiple cervical nabothian cysts noted, dominant cyst measuring 0.8 cm.  Myometrial thickening of the posterior fundus with ill definition of the junctional zone, and with scattered cystic change of the thickened myometrium, features suggestive of  adenomyosis.       The right ovary measures 3.0 x 3.3 x 3.6 cm containing multiple follicles in addition to a complex intra-ovarian structure measuring 2.1 x 1.8 x 1.6 cm.  This demonstrates homogeneous T1 hyperintensity with mixed T2 signal characteristics.  This was  present on ultrasound of 03/09/2024 measuring 2.7 x 2.7 x 3.1 cm.  The combination of sonographic and MRI features are most consistent with endometrioma.     The left ovary measures 2.5 x 3.3 x 3.7 cm demonstrating multiple follicles.     Decompression of the urinary bladder.  The visualized GI tract is within normal limits.     Small volume of free pelvic fluid is within the range of physiologic.  No retroperitoneal or pelvic lymphadenopathy.     No ectopic endometrial implants elsewhere within the pelvis.     Normal bone marrow signal is preserved.                   Impression   CONCLUSION:       1. Arcuate uterine morphology.  Signal and morphologic irregularity of the posterior uterine fundus most suggestive adenomyosis.       2.  Normal endometrial thickness and signal.     3. Complex structure within the left ovary demonstrates a combination of sonographic and MRI features most suggestive of endometrioma  (2.1 x 1.8 x 1.6 cm).        LOCATION:  [id number]        Dictated by (CST): Rebekah Wolfe MD on 12/03/2024 at 10:49 PM      Finalized by (CST): Rebekah Wolfe MD on 12/03/2024 at 10:59 PM       ASSESSMENT AND PLAN:   Anette Perez is a 34 year old female who presents for a preop visit for prob endometrioma/hx of pelvic pain and hx of dysmenorrhea,  hx of prob endometriosis. Pt was counseled again today on the risks/benefits/alternatives of a davinci operative laparoscopy/excision of ovarian cyst/endometrioma/possible ablation of endometriosis,  including the risks of bleeding/infection/damage to internal organs including bowel/bladder/uterus/ovaries/tubes/cervix/vagina/ureters/blood vessels/among others/hemorrhage and blood tranfusions/thromboembolic dz such as dvt/pe/mi/stroke/among other risks/and the pt voiced her understanding and all questions answered.

## 2025-01-22 ENCOUNTER — OFFICE VISIT (OUTPATIENT)
Dept: OBGYN CLINIC | Facility: CLINIC | Age: 35
End: 2025-01-22

## 2025-01-22 VITALS
DIASTOLIC BLOOD PRESSURE: 74 MMHG | HEIGHT: 63 IN | BODY MASS INDEX: 32.78 KG/M2 | WEIGHT: 185 LBS | SYSTOLIC BLOOD PRESSURE: 114 MMHG

## 2025-01-22 DIAGNOSIS — N94.6 DYSMENORRHEA: ICD-10-CM

## 2025-01-22 DIAGNOSIS — N80.9 SEVERE ENDOMETRIOSIS: Primary | ICD-10-CM

## 2025-01-22 PROCEDURE — 99024 POSTOP FOLLOW-UP VISIT: CPT | Performed by: OBSTETRICS & GYNECOLOGY

## 2025-01-22 NOTE — TELEPHONE ENCOUNTER
Medication PA Requested:  Lupron Depot 3.75                                                    CoverMyMeds Used:   Key:  Quantity: 3.75 mg IM injection  Day Supply: every 28 days  Sig: 3.75 mg IM injection every 28 days   DX Code:   N80.9                                  CPT code (if applicable):   Case Number/Pending Ref#:

## 2025-01-22 NOTE — PROGRESS NOTES
HPI:   Anette Perez is a 34 year old female who presents for a f/u for hx of severe endometriosis.  Pt counseled and pt requested to proceed with lupron tx for 6 months. Plan menstrual suppression after lupron due to to endometriosis/severe dysmemnorrhea.  All questions answered.     Wt Readings from Last 6 Encounters:   01/22/25 185 lb (83.9 kg)   01/08/25 185 lb (83.9 kg)   01/07/25 188 lb 4.8 oz (85.4 kg)   12/27/24 186 lb (84.4 kg)   12/16/24 180 lb (81.6 kg)   05/18/24 180 lb (81.6 kg)     Body mass index is 32.77 kg/m².    Cholesterol, Total (mg/dL)   Date Value   03/09/2024 160   12/06/2023 215 (H)   08/05/2023 245 (H)     HDL Cholesterol (mg/dL)   Date Value   03/09/2024 41   12/06/2023 39 (L)   08/05/2023 42     LDL Cholesterol (mg/dL)   Date Value   03/09/2024 101 (H)   12/06/2023 145 (H)   08/05/2023 170 (H)     AST (U/L)   Date Value   12/27/2024 16   03/09/2024 22   08/05/2023 19     ALT (U/L)   Date Value   12/27/2024 21   03/09/2024 27   08/05/2023 34        Current Outpatient Medications   Medication Sig Dispense Refill    HUMIRA, 2 PEN, 40 MG/0.4ML Subcutaneous Auto-injector Kit       clobetasol 0.05 % External Solution Use bid  As directed to scalp 50 mL 6    Adalimumab (HUMIRA, 2 PEN,) 40 MG/0.4ML Subcutaneous Pen-injector Kit Inject 40 mg into the skin every 14 (fourteen) days. Maintenance dose 2 each 5    Fluocinolone Acetonide Scalp (DERMA-SMOOTHE/FS SCALP) 0.01 % External Oil Use qhs as directed for scalp psoriasis 118 mL 12    Minoxidil 5 % External Solution Apply to areas of hair loss qd (Patient not taking: Reported on 1/22/2025) 60 mL 1    tacrolimus (PROTOPIC) 0.1 % External Ointment Use at bedtime to areas of psoriasis on face (Patient not taking: Reported on 1/22/2025) 30 g 1    omeprazole 20 MG Oral Capsule Delayed Release Take 1 capsule (20 mg total) by mouth every morning before breakfast. (Patient not taking: Reported on 1/22/2025) 90 capsule 3      Past Medical History:     Asthma (HCC)    Decorative tattoo    Esophageal reflux    Hyperlipidemia     (normal spontaneous vaginal delivery) (HCC)     (normal spontaneous vaginal delivery) (HCC)     (normal spontaneous vaginal delivery) (HCC)    Psoriasis    Visual impairment    glasses      Past Surgical History:   Procedure Laterality Date    Hemorrhoidectomy  2019    L anterior midline    Tubal ligation        Family History   Problem Relation Age of Onset    Cancer Paternal Grandmother         stomach    Gastro-Intestinal Disorder Maternal Grandmother         bleeding ulcer (cause of death)    Asthma Maternal Grandmother     Genetic Disease Maternal Uncle         muscular dystrophy      Social History:   Social History     Socioeconomic History    Marital status:     Number of children: 3   Occupational History    Occupation: [employer]     Employer: [employer]    Tobacco Use    Smoking status: Former     Current packs/day: 0.00     Types: Cigarettes     Quit date: 2016     Years since quittin.7     Passive exposure: Never    Smokeless tobacco: Never   Vaping Use    Vaping status: Never Used   Substance and Sexual Activity    Alcohol use: No     Alcohol/week: 0.0 standard drinks of alcohol    Drug use: No    Sexual activity: Yes     Birth control/protection: Condom   Other Topics Concern    Caffeine Concern Yes     Comment: coffee,soda    Grew up on a farm No    History of tanning Yes    Outdoor occupation No    Pt has a pacemaker No    Pt has a defibrillator No    Breast feeding No    Reaction to local anesthetic No     Social Drivers of Health     Financial Resource Strain: Not on File (2023)    Received from [facility]    Financial Resource Strain     Financial Resource Strain: 0   Food Insecurity: Not on File (2024)    Received from [facility]    Food Insecurity     Food: 0   Transportation Needs: Not on File (2023)    Received from [facility]    Transportation Needs      Transportation: 0   Physical Activity: Not on File (2023)    Received from [facility]    Physical Activity     Physical Activity: 0   Stress: Not on File (2023)    Received from [facility]    Stress     Stress: 0   Social Connections: Not on File (2024)    Received from [facility]    Social Connections     Connectedness: 0   Housing Stability: Not on File (2023)    Received from [facility]    Housing Stability     Housin            REVIEW OF SYSTEMS:   GENERAL: feels well otherwise  SKIN: denies any unusual skin lesions  EYES:denies blurred vision or double vision  HEENT: denies nasal congestion, sinus pain or ST  LUNGS: denies shortness of breath with exertion  CARDIOVASCULAR: denies chest pain on exertion  GI: denies abdominal pain,denies heartburn  : denies dysuria, vaginal discharge or itching,periods regular   MUSCULOSKELETAL: denies back pain  NEURO: denies headaches  PSYCHE: denies depression or anxiety  HEMATOLOGIC: denies hx of anemia  ENDOCRINE: denies thyroid history  ALL/ASTHMA: denies hx of allergy or asthma    EXAM:   /74   Ht 5' 3\" (1.6 m)   Wt 185 lb (83.9 kg)   LMP 2024 (Exact Date)   BMI 32.77 kg/m²   Body mass index is 32.77 kg/m².   GENERAL: well developed, well nourished,in no apparent distress  SKIN: no rashes,no suspicious lesions  HEENT: atraumatic, normocephalic  EYES:normal in appearance  NECK: supple,no adenopathy  CHEST: no chest tenderness  LUNGS: clear to auscultation  CARDIO: RRR without murmur  GI: good BS's,no masses, HSM or tenderness, healed incisions.     MUSCULOSKELETAL: back is not tender,FROM of the back  EXTREMITIES: no cyanosis, clubbing or edema  NEURO: Oriented times three            Component  Ref Range & Units      Case Report     Surgical Pathology                                Case: [id number]                                     Authorizing Provider:  Darryl Norman MD    Collected:           2025 08:34 AM              Ordering Location:     [facility]          Received:            01/08/2025 09:09 AM                                    Operating Room                                                                 Pathologist:           Antolin Campa MD                                                           Specimen:    Ovarian cyst, left, 1. LEFT OVARIAN CYST                                                      Final Diagnosis:        Left ovarian cyst, robot-assisted laparoscopic left ovarian cystectomy:   Ovarian tissue with extensive hemosiderin-laden histiocytes, endometrial glands and stroma, consistent with endometriosis.        Electronically signed by Antolin Campa MD on 1/9/2025 at  2:06 PM                   ASSESSMENT AND PLAN:   Anette Perez is a 34 year old female who presents for a f/u for hx of severe endometriosis.  Pt counseled extensively on options of tx with severe endometriosis, and pt requested to proceed with lupron tx for 6 months. Plan menstrual suppression after lupron due to to endometriosis/severe dysmemnorrhea.  All questions answered

## 2025-02-03 NOTE — OPERATIVE REPORT
[facility]    PATIENT'S NAME: PATEL ESPINO   ATTENDING PHYSICIAN: Darryl Norman MD   OPERATING PHYSICIAN: Darryl Norman MD   PATIENT ACCOUNT#:   [id number]    LOCATION:  [address]  MEDICAL RECORD #:   [id number]       YOB: 1990  ADMISSION DATE:       01/08/2025      OPERATION DATE:  01/08/2025    OPERATIVE REPORT    PREOPERATIVE DIAGNOSIS:    1.   Probable endometrioma.  2.   Chronic pelvic pain.  POSTOPERATIVE DIAGNOSIS:    1.   Probable endometrioma.  2.   Chronic pelvic pain.  PROCEDURE:    1.   Da Rosa Xi robot-assisted laparoscopic left ovarian cystectomy.  2.   Ablation of endometriosis.     ASSISTANT:  GHAZALA Rivas     COMPLICATIONS:  None.    CONDITION:  Patient tolerated the procedure well, was taken to the [facility] in good condition.     INDICATIONS:  The patient is a 34-year-old female with chronic pelvic pain and probable endometrioma noted in the left ovary, who requested operative intervention with da Rosa-assisted laparoscopic left ovarian cystectomy, possible ablation of endometriosis, including the risks, benefits, and alternatives counseled, including risks of bleeding; infection; damage to internal organs (including bowel, bladder, uterus, ovaries, tubes, cervix, vagina, ureters, blood vessels, among other organs); risk of anesthesia; risk of thromboembolic disease such as DVT, PE, MI, stroke; risk of hemorrhage and blood transfusion among other risks.  The patient voiced understanding of all the above, and all questions were answered.  Preoperative pregnancy test was negative.    FINDINGS:    1.   Left ovarian endometrioma noted and excised.    2.   Endometriosis lesions noted in the right ovary and the left ovary with ablation performed.    OPERATIVE TECHNIQUE:  The patient was taken to the operating room, and after induction of general endotracheal anesthesia, patient was prepped and draped in usual sterile fashion.  A Lechuga  catheter was placed in the bladder for drainage.  Sequential compression devices were begun prior to procedure, were operative throughout the procedure, and will be operative postop as well for DVT prophylaxis.  Sterile speculum was placed in the vagina, and the HUMI uterine manipulator was placed in the usual fashion.  Sterile speculum was then removed.  Attention was turned to the abdomen.  The Veress needle was introduced through a 5 mm umbilical incision, and the abdomen was fully insufflated.  The patient was placed in Trendelenburg position prior to that.  The da Rosa ports were placed in the usual fashion in the umbilicus as well as the left and right mid quadrant, as well as an assist port in the right lower quadrant.  The da Rosa robot was then docked in the usual fashion.  The uterus, ovaries, and tubes were then elevated and the left ovarian probable endometrioma was noted.  The left endometrioma was then excised using the da Rosa robot, and this was removed through the assist port.  In addition, endometriosis lesions were noted in the right ovary and the left ovary where an ablation was performed using the da Rosa bipolar device.  Prior endometriosis scarring was noted as well in the posterior cul-de-sac.  No other active lesions were noted at this time.  At this time, excellent hemostasis was noted.  The abdomen was then desufflated, and the da Rosa ports were removed in the usual fashion and the incisions closed with 4-0 Vicryl suture in the usual fashion and then closed with Dermabond suture as well.  All counts were correct.  Excellent hemostasis was noted.  The HUMI uterine manipulator was deflated and removed in the usual fashion, and all counts were correct as well.  Excellent hemostasis noted in the vagina as well.  Patient tolerated the procedure well, was taken to the [facility] in good condition.     Dictated By Darryl Norman MD  d: 02/01/2025 09:14:01  t: 02/01/2025  09:30:53  [facility] [id number]  [facility]/

## 2025-02-12 ENCOUNTER — TELEPHONE (OUTPATIENT)
Dept: OBGYN CLINIC | Facility: CLINIC | Age: 35
End: 2025-02-12

## 2025-02-12 ENCOUNTER — TELEPHONE (OUTPATIENT)
Dept: DERMATOLOGY CLINIC | Facility: CLINIC | Age: 35
End: 2025-02-12

## 2025-02-12 NOTE — TELEPHONE ENCOUNTER
Patient is asking if the injection would be covered by her insurance ,  patient don't  remember name of the  shots

## 2025-02-12 NOTE — TELEPHONE ENCOUNTER
Patient verified name and     Patient reports after her surgery on  she noticed some rashes two weeks ago around stomach area. Patient was seen on  did not have symptoms then. Patient reports little bumps are itchy. Has been taking Benadryl as needed, symptoms return afterwards. Denies trouble breathing/shortness of breathe. Offered an appointment in office, declined for this week. Patient wishes to be seen on Monday. Appointment . Patient informed if she develops shortness of breath or trouble breathing she should follow up in the ER.    Patient also inquiring about Lupron. Informed of denial, aware we are awaiting for alternatives from doctor. Informed of appeal process if she wants to initiate. Voiced understanding and agreed. States she will follow up on  with the doctor.

## 2025-02-17 ENCOUNTER — OFFICE VISIT (OUTPATIENT)
Dept: OBGYN CLINIC | Facility: CLINIC | Age: 35
End: 2025-02-17

## 2025-02-17 VITALS
SYSTOLIC BLOOD PRESSURE: 107 MMHG | DIASTOLIC BLOOD PRESSURE: 71 MMHG | WEIGHT: 185 LBS | HEIGHT: 63 IN | BODY MASS INDEX: 32.78 KG/M2

## 2025-02-17 DIAGNOSIS — Z30.42 ENCOUNTER FOR MANAGEMENT AND INJECTION OF DEPO-PROVERA: ICD-10-CM

## 2025-02-17 DIAGNOSIS — R21 RASH, SKIN: ICD-10-CM

## 2025-02-17 DIAGNOSIS — N80.9 SEVERE ENDOMETRIOSIS: Primary | ICD-10-CM

## 2025-02-17 LAB
CONTROL LINE PRESENT WITH A CLEAR BACKGROUND (YES/NO): YES YES/NO
KIT LOT #: NORMAL NUMERIC
PREGNANCY TEST, URINE: NEGATIVE

## 2025-02-17 RX ORDER — BETAMETHASONE VALERATE 1.2 MG/G
1 CREAM TOPICAL DAILY
Qty: 15 G | Refills: 0 | Status: SHIPPED | OUTPATIENT
Start: 2025-02-17

## 2025-02-17 RX ORDER — HYDROXYZINE HYDROCHLORIDE 10 MG/1
TABLET, FILM COATED ORAL
COMMUNITY
Start: 2025-02-15

## 2025-02-17 NOTE — PROGRESS NOTES
HPI:   Anette Perez is a 34 year old female who presents for a hx of rash on abd, pt also with hx of severe endometriosis s/p endometrioma excision, lupron was denied by insurance. Pt counseled, pt stated she has used depo provera before and wants to use depo provera.  Pt also has whole abd rash for approx 2 weeks, does not recall any new stimuli/product at home. Counseled, pt wants to try bmtz cream and benadryl and will notify us if this does not resolve rash, and pt to make appt with dermatology at that point.     Wt Readings from Last 6 Encounters:   02/17/25 185 lb (83.9 kg)   01/22/25 185 lb (83.9 kg)   01/08/25 185 lb (83.9 kg)   01/07/25 188 lb 4.8 oz (85.4 kg)   12/27/24 186 lb (84.4 kg)   12/16/24 180 lb (81.6 kg)     Body mass index is 32.77 kg/m².    Cholesterol, Total (mg/dL)   Date Value   03/09/2024 160   12/06/2023 215 (H)   08/05/2023 245 (H)     HDL Cholesterol (mg/dL)   Date Value   03/09/2024 41   12/06/2023 39 (L)   08/05/2023 42     LDL Cholesterol (mg/dL)   Date Value   03/09/2024 101 (H)   12/06/2023 145 (H)   08/05/2023 170 (H)     AST (U/L)   Date Value   12/27/2024 16   03/09/2024 22   08/05/2023 19     ALT (U/L)   Date Value   12/27/2024 21   03/09/2024 27   08/05/2023 34        Current Outpatient Medications   Medication Sig Dispense Refill    hydrOXYzine 10 MG Oral Tab       HUMIRA, 2 PEN, 40 MG/0.4ML Subcutaneous Auto-injector Kit       clobetasol 0.05 % External Solution Use bid  As directed to scalp 50 mL 6    Adalimumab (HUMIRA, 2 PEN,) 40 MG/0.4ML Subcutaneous Pen-injector Kit Inject 40 mg into the skin every 14 (fourteen) days. Maintenance dose 2 each 5    Fluocinolone Acetonide Scalp (DERMA-SMOOTHE/FS SCALP) 0.01 % External Oil Use qhs as directed for scalp psoriasis 118 mL 12    Minoxidil 5 % External Solution Apply to areas of hair loss qd (Patient not taking: Reported on 1/7/2025) 60 mL 1    tacrolimus (PROTOPIC) 0.1 % External Ointment Use at bedtime to areas of  psoriasis on face (Patient not taking: Reported on 2025) 30 g 1    omeprazole 20 MG Oral Capsule Delayed Release Take 1 capsule (20 mg total) by mouth every morning before breakfast. (Patient not taking: Reported on 2025) 90 capsule 3      Past Medical History:    Asthma (HCC)    Decorative tattoo    Esophageal reflux    Hyperlipidemia     (normal spontaneous vaginal delivery) (HCC)     (normal spontaneous vaginal delivery) (HCC)     (normal spontaneous vaginal delivery) (HCC)    Psoriasis    Visual impairment    glasses      Past Surgical History:   Procedure Laterality Date    Hemorrhoidectomy  2019    L anterior midline    Tubal ligation        Family History   Problem Relation Age of Onset    Cancer Paternal Grandmother         stomach    Gastro-Intestinal Disorder Maternal Grandmother         bleeding ulcer (cause of death)    Asthma Maternal Grandmother     Genetic Disease Maternal Uncle         muscular dystrophy      Social History:   Social History     Socioeconomic History    Marital status:     Number of children: 3   Occupational History    Occupation: [employer]     Employer: [employer]    Tobacco Use    Smoking status: Former     Current packs/day: 0.00     Types: Cigarettes     Quit date: 2016     Years since quittin.8     Passive exposure: Never    Smokeless tobacco: Never   Vaping Use    Vaping status: Never Used   Substance and Sexual Activity    Alcohol use: No     Alcohol/week: 0.0 standard drinks of alcohol    Drug use: No    Sexual activity: Yes     Birth control/protection: Condom   Other Topics Concern    Caffeine Concern Yes     Comment: coffee,soda    Grew up on a farm No    History of tanning Yes    Outdoor occupation No    Pt has a pacemaker No    Pt has a defibrillator No    Breast feeding No    Reaction to local anesthetic No     Social Drivers of Health     Food Insecurity: Not on File (2024)    Received from [employer]  Insecurity     Food: 0   Transportation Needs: Not on File (2023)    Received from [facility]    Transportation Needs     Transportation: 0   Stress: Not on File (2023)    Received from [facility]    Stress     Stress: 0   Housing Stability: Not on File (2023)    Received from [facility]    Housing Stability     Housin            REVIEW OF SYSTEMS:   GENERAL: feels well otherwise  SKIN: denies any unusual skin lesions  EYES:denies blurred vision or double vision  HEENT: denies nasal congestion, sinus pain or ST  LUNGS: denies shortness of breath with exertion  CARDIOVASCULAR: denies chest pain on exertion  GI: denies abdominal pain,denies heartburn  : denies dysuria, vaginal discharge or itching,periods regular   MUSCULOSKELETAL: denies back pain  NEURO: denies headaches  PSYCHE: denies depression or anxiety  HEMATOLOGIC: denies hx of anemia  ENDOCRINE: denies thyroid history  ALL/ASTHMA: denies hx of allergy or asthma    EXAM:   /71   Ht 5' 3\" (1.6 m)   Wt 185 lb (83.9 kg)   LMP 2024 (Exact Date)   BMI 32.77 kg/m²   Body mass index is 32.77 kg/m².   GENERAL: well developed, well nourished,in no apparent distress  SKIN: no rashes,no suspicious lesions  HEENT: atraumatic, normocephalic  EYES:normal in appearance  NECK: supple,no adenopathy  CHEST: no chest tenderness  LUNGS: clear to auscultation  CARDIO: RRR without murmur  GI: good BS's,no masses, HSM or tenderness  incisions not assoc with rash,  ant abd wall rash,  pt counseled, pt wants to try cream plus benadryl, agreed to call dermatology if not resolved.   :introitus is normal,scant discharge,cervix is pink,no adnexal masses or tenderness, PAP was done     MUSCULOSKELETAL: back is not tender,FROM of the back  EXTREMITIES: no cyanosis, clubbing or edema  NEURO: Oriented times three      ASSESSMENT AND PLAN:   Anette Perez is a 34 year old female who presents for a hx of rash on abd, pt also with hx of  severe endometriosis s/p endometrioma excision, lupron was denied by insurance. Pt counseled, pt stated she has used depo provera before and wants to use depo provera.  Pt also has whole abd rash for approx 2 weeks, does not recall any new stimuli/product at home. Counseled, pt wants to try bmtz cream and benadryl and will notify us if this does not resolve rash, and pt to make appt with dermatology at that point.

## 2025-02-28 ENCOUNTER — OFFICE VISIT (OUTPATIENT)
Dept: DERMATOLOGY CLINIC | Facility: CLINIC | Age: 35
End: 2025-02-28

## 2025-02-28 DIAGNOSIS — L40.0 PSORIASIS VULGARIS: ICD-10-CM

## 2025-02-28 DIAGNOSIS — L70.0 ACNE VULGARIS: ICD-10-CM

## 2025-02-28 DIAGNOSIS — L63.9 ALOPECIA AREATA: Primary | ICD-10-CM

## 2025-02-28 DIAGNOSIS — Z51.81 MEDICATION MONITORING ENCOUNTER: ICD-10-CM

## 2025-02-28 PROCEDURE — 99214 OFFICE O/P EST MOD 30 MIN: CPT | Performed by: DERMATOLOGY

## 2025-02-28 RX ORDER — MINOXIDIL 2.5 MG/1
TABLET ORAL
Qty: 30 TABLET | Refills: 2 | Status: SHIPPED | OUTPATIENT
Start: 2025-02-28

## 2025-03-03 NOTE — PROGRESS NOTES
Anette Perez is a 34 year old female.    Chief Complaint   Patient presents with    Alopecia     LOV 2025. Pt present for f/u on alopecia. Using Cloebetasol and Minoxidil Solution. States it has gotten worse and now has new bald spots on top of scalp and mid scalp.              Patient has no known allergies.  Current Outpatient Medications   Medication Sig Dispense Refill    minoxidil 2.5 MG Oral Tab Take 1/2 tab po every day x 2 weeks, then increase to 1 tab daily 30 tablet 2    hydrOXYzine 10 MG Oral Tab       betamethasone 0.1 % External Cream Apply 1 Application topically daily. 15 g 0    HUMIRA, 2 PEN, 40 MG/0.4ML Subcutaneous Auto-injector Kit       clobetasol 0.05 % External Solution Use bid  As directed to scalp 50 mL 6    Minoxidil 5 % External Solution Apply to areas of hair loss qd 60 mL 1    Adalimumab (HUMIRA, 2 PEN,) 40 MG/0.4ML Subcutaneous Pen-injector Kit Inject 40 mg into the skin every 14 (fourteen) days. Maintenance dose 2 each 5    Fluocinolone Acetonide Scalp (DERMA-SMOOTHE/FS SCALP) 0.01 % External Oil Use qhs as directed for scalp psoriasis 118 mL 12    tacrolimus (PROTOPIC) 0.1 % External Ointment Use at bedtime to areas of psoriasis on face (Patient not taking: Reported on 2025) 30 g 1    omeprazole 20 MG Oral Capsule Delayed Release Take 1 capsule (20 mg total) by mouth every morning before breakfast. (Patient not taking: Reported on 2025) 90 capsule 3      Past Medical History:    Asthma (HCC)    Decorative tattoo    Esophageal reflux    Hyperlipidemia     (normal spontaneous vaginal delivery) (HCC)     (normal spontaneous vaginal delivery) (HCC)     (normal spontaneous vaginal delivery) (HCC)    Psoriasis    Visual impairment    glasses      Social History:  Social History     Socioeconomic History    Marital status:     Number of children: 3   Occupational History    Occupation: [employer]     Employer: [employer]    Tobacco Use     Smoking status: Former     Current packs/day: 0.00     Types: Cigarettes     Quit date: 2016     Years since quittin.8     Passive exposure: Never    Smokeless tobacco: Never   Vaping Use    Vaping status: Never Used   Substance and Sexual Activity    Alcohol use: No     Alcohol/week: 0.0 standard drinks of alcohol    Drug use: No    Sexual activity: Yes     Birth control/protection: Condom   Other Topics Concern    Caffeine Concern Yes     Comment: coffee,soda    Grew up on a farm No    History of tanning Yes    Outdoor occupation No    Pt has a pacemaker No    Pt has a defibrillator No    Breast feeding No    Reaction to local anesthetic No     Social Drivers of Health     Food Insecurity: Not on File (2024)    Received from [facility]    Food Insecurity     Food: 0   Transportation Needs: Not on File (2023)    Received from [facility]    Transportation Needs     Transportation: 0   Stress: Not on File (2023)    Received from [facility]    Stress     Stress: 0   Housing Stability: Not on File (2023)    Received from [facility]    Housing Stability     Housin                 Current Outpatient Medications   Medication Sig Dispense Refill    minoxidil 2.5 MG Oral Tab Take 1/2 tab po every day x 2 weeks, then increase to 1 tab daily 30 tablet 2    hydrOXYzine 10 MG Oral Tab       betamethasone 0.1 % External Cream Apply 1 Application topically daily. 15 g 0    HUMIRA, 2 PEN, 40 MG/0.4ML Subcutaneous Auto-injector Kit       clobetasol 0.05 % External Solution Use bid  As directed to scalp 50 mL 6    Minoxidil 5 % External Solution Apply to areas of hair loss qd 60 mL 1    Adalimumab (HUMIRA, 2 PEN,) 40 MG/0.4ML Subcutaneous Pen-injector Kit Inject 40 mg into the skin every 14 (fourteen) days. Maintenance dose 2 each 5    Fluocinolone Acetonide Scalp (DERMA-SMOOTHE/FS SCALP) 0.01 % External Oil Use qhs as directed for scalp psoriasis 118 mL 12    tacrolimus (PROTOPIC) 0.1 % External  Ointment Use at bedtime to areas of psoriasis on face (Patient not taking: Reported on 2025) 30 g 1    omeprazole 20 MG Oral Capsule Delayed Release Take 1 capsule (20 mg total) by mouth every morning before breakfast. (Patient not taking: Reported on 2025) 90 capsule 3     Allergies:   No Known Allergies    Past Medical History:    Asthma (HCC)    Decorative tattoo    Esophageal reflux    Hyperlipidemia     (normal spontaneous vaginal delivery) (HCC)     (normal spontaneous vaginal delivery) (HCC)     (normal spontaneous vaginal delivery) (HCC)    Psoriasis    Visual impairment    glasses     Past Surgical History:   Procedure Laterality Date    Hemorrhoidectomy  2019    L anterior midline    Tubal ligation       Social History     Socioeconomic History    Marital status:      Spouse name: Not on file    Number of children: 3    Years of education: Not on file    Highest education level: Not on file   Occupational History    Occupation: [address]     Employer: [employer]    Tobacco Use    Smoking status: Former     Current packs/day: 0.00     Types: Cigarettes     Quit date: 2016     Years since quittin.8     Passive exposure: Never    Smokeless tobacco: Never   Vaping Use    Vaping status: Never Used   Substance and Sexual Activity    Alcohol use: No     Alcohol/week: 0.0 standard drinks of alcohol    Drug use: No    Sexual activity: Yes     Birth control/protection: Condom   Other Topics Concern     Service Not Asked    Blood Transfusions Not Asked    Caffeine Concern Yes     Comment: coffee,soda    Occupational Exposure Not Asked    Hobby Hazards Not Asked    Sleep Concern Not Asked    Stress Concern Not Asked    Weight Concern Not Asked    Special Diet Not Asked    Back Care Not Asked    Exercise Not Asked    Bike Helmet Not Asked    Seat Belt Not Asked    Self-Exams Not Asked    Grew up on a farm No    History of tanning Yes    Outdoor occupation  No    Pt has a pacemaker No    Pt has a defibrillator No    Breast feeding No    Reaction to local anesthetic No   Social History Narrative    Not on file     Social Drivers of Health     Food Insecurity: Not on File (2024)    Received from [facility]    Food Insecurity     Food: 0   Transportation Needs: Not on File (2023)    Received from [facility]    Transportation Needs     Transportation: 0   Stress: Not on File (2023)    Received from [facility]    Stress     Stress: 0   Housing Stability: Not on File (2023)    Received from [facility]    Housing Stability     Housin     Family History   Problem Relation Age of Onset    Cancer Paternal Grandmother         stomach    Gastro-Intestinal Disorder Maternal Grandmother         bleeding ulcer (cause of death)    Asthma Maternal Grandmother     Genetic Disease Maternal Uncle         muscular dystrophy                      HPI :      Chief Complaint   Patient presents with    Alopecia     LOV 2025. Pt present for f/u on alopecia. Using Cloebetasol and Minoxidil Solution. States it has gotten worse and now has new bald spots on top of scalp and mid scalp.      Follow-up psoriasis.  Has noted in general improvement on Humira currently.  Scalp generally has improved.  Patient's noted increasing hair loss patchy for tiredly over the parietal area.  Patient under more stress recently family numbers visiting.  Notes some itching.  More scaling on the face.  Hair loss came up suddenly has noted some tenderness in these areas.  Nothing else new or different.  Had strep recently, several courses of antibiotics azithromycin and cefdinir recent labs show elevated white count, no anemia eosinophils 0.1% symptoms have improved with antibiotics.  Denies any reaction.  Patient here with daughter and     History of Present Illness  Anette Perez is a 34 year old female with alopecia areata and psoriasis who presents with worsening hair  loss.    She is experiencing new and worsening hair loss, describing it as 'like it's falling' and noting visible spots on her scalp. She is concerned about the progression and mentions trying to cover the areas but finding it increasingly difficult. She has purchased a wig due to concerns about potentially losing all her hair. She is currently taking minoxidil tablets, two tablets, which makes her feel more sleepy the following day. Additionally, she uses topical solutions, one of which smells like alcohol, and another that is applied as drops.    She has a history of alopecia areata and psoriasis, for which she is currently taking Humira every two weeks. The medication helps significantly with her arthritis symptoms. She experiences occasional itching, particularly on her arms, which she attributes to psoriasis. She is also taking hydroxyzine for itching, which she refers to as 'the little ones' and uses them at night.    She mentions a rash that developed after a surgery in January, which she initially thought was related to the procedure. However, a doctor indicated it was not related, and she was prescribed a cream to alleviate itching, which has been somewhat effective. She describes the rash as a 'nail rash' and notes it is improving slowly.    Her blood pressure is stable and she is recovering well from her surgery, except for the ongoing hair loss, which she finds distressing.      Patient presents with concerns above.    Past notes/ records and appropriate/relevant lab results including pathology and past body maps reviewed. Updated and new information noted in current visit.       ROS:    Denies any other systemic complaints.  No fevers, chills, night sweats, sensitivity to the sun, deeper lumps or bumps.  No other skin complaints.  History, medications, allergies as noted.    Physical examination: Patient  well-developed well-nourished, alert oriented in no acute distress.  Exam of involved, appropriate  areas of skin performed, including scalp, head, neck, face,nails, hair, external eyes, including conjunctival mucosa, eyelids, lips, external ears, back, chest, abdomen, arms, legs, palms.  Remarkable for lesions as noted   See map for details  Patchy areas of hair loss bilateral parietal scalp right greater than left.  Erythema and scaling over the glabella nasolabial folds alar creases     ASSESSMENT AND PLAN:     Encounter Diagnoses   Name Primary?    Alopecia areata Yes    Psoriasis vulgaris     Medication monitoring encounter     Acne vulgaris          Physical Exam  SKIN: Hair regrowth present.    Results        Assessment & Plan  Alopecia Areata  Worsening alopecia areata with new and expanding areas of hair loss, likely exacerbated by recent surgery. Currently on Humira for psoriasis and psoriatic arthritis, limiting other medication options. Discussed oral minoxidil for hair growth, with potential side effects including facial hair growth. Newer medications like Rinvoq or Cibinqo might help both conditions but lack sufficient data. Stopping Humira is not advisable due to its necessity for psoriasis and arthritis control.  - Continue oral minoxidil at 2.5 mg daily, start with half a tablet and increase to a full tablet after 1-2 weeks if tolerated  - Continue topical minoxidil solutions  - Administer intralesional corticosteroid injections every 3-4 weeks  - Consider adding hydroxychloroquine if no improvement    Psoriasis  Well-controlled with Humira. Reports improvement in scalp psoriasis with no significant flares.  - Continue Humira as prescribed    Psoriatic Arthritis  Managed with Humira, effectively controlling symptoms.  - Continue Humira as prescribed    General Health Maintenance  Overall good health post-surgery.  - Monitor blood pressure regularly    Follow-up  - Follow up in 3-4 weeks for intralesional corticosteroid injections  - Monitor response to oral minoxidil and adjust dosage as  needed.      Assessment / plan:  Acneform lesions have improved.  Continue monitoring.    History of psoriasis.  Improved on Humira currently still active areas on the face scalp okay   Psoriasis.  Plaque-type.  Currently less than 5% BSA involvement.  Prior to initiating Humira 30-% body surface involvement.  Will treat with medications and grid.  Overall skincare, liberal use of emollients discussed.  Consider more aggressive therapy if worsening.Patient will let us know how they are doing over the next several weeks.  Await clinical response to above therapy.  Recheck in 2-3 months if no improvement.    Widespread lesions especially over the scalp, previously currently clear    Monitor for guttate flare with recent strep.  Continue Humira  Medically necessary given widespread psoriasis worsening, scalp involvement, symptoms interfering with daily function, work, severe itching.    For psoriasis on face.  Will add tacrolimus.  Clobetasol scalp    Will need follow-up in 4 to 6 months, continue quant gold due at least annually      Alopecia areata worsening and her above.  Intralesional Kenalog 10 mg per mL total of 2.0 mL injected to above sites.  At bilateral parietal scalp autoimmune nature possibly occurring with other auto inflammatory conditions including thyroid problems as well as association with atopic conditions, potentially relapsing, recurrent persistent condition discussed.  Pathophysiology reviewed.  See grid for topicals.  May consider minoxidil.  No labs presently consider thyroid function, RACHELE, CBC if persists.  Topicals including newer medications, various forms of topical steroids, systemic medications reviewed.  Followup one month or p.r.n.  Clobetasol twice daily to patchy areas on the scalp, minoxidil solution twice daily  Await response recheck in 6 weeks if needed stress likely factor other labs reviewed consider additional labs if worsening    Pathophysiology discussed with patient.   Therapeutic options reviewed.  See  Medications in grid.  Instructions reviewed at length.     General skin care questions answered.   Reassurance regarding benign skin lesions.Signs and symptoms of skin cancer, ABCDE's of melanoma briefly reviewed.  Sunscreen use (broad-spectrum, ideally mineral, UVA UVB coverage SPF 30 or greater recommended), sun protection, encouraged.  Followup as noted in rtc or p.r.n.  Encounter Times   Including precharting, reviewing chart, prior notes obtaining history, medical exam, notes on body map, plan, counseling, discussion of therapeutic options, patient education, orders more than half total time spent in face to face time with patient.  My total time spent caring for the patient on the day of the encounter: 35 minutes       The patient indicates understanding of these issues and agrees to the plan.  The patient is asked to return as noted in follow-up /as noted above    This note was generated using Dragon voice recognition software.  Please contact me regarding any confusion resulting from errors in recognition.  Note to patient and family: The 21st Century Cures Act makes medical notes like these available to patients. However, be advised this is a medical document. It is intended as [id number] communication and monitoring of a patient's care needs. It is written in medical language and may contain abbreviations or verbiage that are unfamiliar. It may appear blunt or direct. Medical documents are intended to carry relevant information, facts as evident and the clinical opinion of the practitioner.  No orders of the defined types were placed in this encounter.      Meds & Refills for this Visit:   Requested Prescriptions     Signed Prescriptions Disp Refills    minoxidil 2.5 MG Oral Tab 30 tablet 2     Sig: Take 1/2 tab po every day x 2 weeks, then increase to 1 tab daily       Encounter Diagnoses   Name Primary?    Psoriasis vulgaris Yes    Medication monitoring encounter         No orders of the defined types were placed in this encounter.      Results From Past 48 Hours:  No results found for this or any previous visit (from the past 48 hours).    Meds This Visit:      Imaging Orders:  None     Referral Orders:  No orders of the defined types were placed in this encounter.

## 2025-03-07 ENCOUNTER — HOSPITAL ENCOUNTER (OUTPATIENT)
Age: 35
Discharge: HOME | End: 2025-03-07
Attending: EMERGENCY MEDICINE
Payer: MEDICAID

## 2025-03-07 VITALS
OXYGEN SATURATION: 97 % | BODY MASS INDEX: 33 KG/M2 | HEART RATE: 93 BPM | RESPIRATION RATE: 18 BRPM | SYSTOLIC BLOOD PRESSURE: 125 MMHG | DIASTOLIC BLOOD PRESSURE: 71 MMHG | WEIGHT: 185 LBS | TEMPERATURE: 99 F

## 2025-03-07 DIAGNOSIS — J11.1 INFLUENZA: Primary | ICD-10-CM

## 2025-03-07 LAB
POCT INFLUENZA A: POSITIVE
POCT INFLUENZA B: NEGATIVE

## 2025-03-07 PROCEDURE — 99213 OFFICE O/P EST LOW 20 MIN: CPT

## 2025-03-07 PROCEDURE — 87502 INFLUENZA DNA AMP PROBE: CPT | Performed by: EMERGENCY MEDICINE

## 2025-03-07 RX ORDER — BENZONATATE 100 MG/1
100 CAPSULE ORAL 3 TIMES DAILY PRN
Qty: 20 CAPSULE | Refills: 0 | Status: SHIPPED | OUTPATIENT
Start: 2025-03-07 | End: 2025-04-06

## 2025-03-07 RX ORDER — OSELTAMIVIR PHOSPHATE 75 MG/1
75 CAPSULE ORAL 2 TIMES DAILY
Qty: 10 CAPSULE | Refills: 0 | Status: SHIPPED | OUTPATIENT
Start: 2025-03-07 | End: 2025-03-12

## 2025-03-07 NOTE — ED INITIAL ASSESSMENT (HPI)
Presents with flu like symptoms since yesterday, fevers that are controlled with OTC medications. Mom also positive with flu B at home.

## 2025-03-21 ENCOUNTER — OFFICE VISIT (OUTPATIENT)
Dept: DERMATOLOGY CLINIC | Facility: CLINIC | Age: 35
End: 2025-03-21

## 2025-03-21 DIAGNOSIS — L63.9 ALOPECIA AREATA: Primary | ICD-10-CM

## 2025-03-21 DIAGNOSIS — L40.0 PSORIASIS VULGARIS: ICD-10-CM

## 2025-03-21 DIAGNOSIS — Z51.81 MEDICATION MONITORING ENCOUNTER: ICD-10-CM

## 2025-03-21 PROCEDURE — 99213 OFFICE O/P EST LOW 20 MIN: CPT | Performed by: DERMATOLOGY

## 2025-03-21 NOTE — PROGRESS NOTES
The following individual(s) verbally consented to be recorded using ambient AI listening technology and understand that they can each withdraw their consent to this listening technology at any point by asking the clinician to turn off or pause the recording:    Patient name: Anetteamy Salvador Chris  Additional names:  Isabela Torres

## 2025-03-24 NOTE — PROGRESS NOTES
Anette Perez is a 34 year old female.    Chief Complaint   Patient presents with    Alopecia     LOV 25- Pt present for alopecia areata follow up and kenalog injections. Using Clobetasol solution every few days and minoxidil 2.5 MG Oral Tab daily. Patient is starting to see new hair growth on the sides of scalp. Denies any itching of the scalp.              Patient has no known allergies.  Current Outpatient Medications   Medication Sig Dispense Refill    minoxidil 2.5 MG Oral Tab Take 1/2 tab po every day x 2 weeks, then increase to 1 tab daily 30 tablet 2    hydrOXYzine 10 MG Oral Tab       betamethasone 0.1 % External Cream Apply 1 Application topically daily. 15 g 0    clobetasol 0.05 % External Solution Use bid  As directed to scalp 50 mL 6    Adalimumab (HUMIRA, 2 PEN,) 40 MG/0.4ML Subcutaneous Pen-injector Kit Inject 40 mg into the skin every 14 (fourteen) days. Maintenance dose 2 each 5    benzonatate 100 MG Oral Cap Take 1 capsule (100 mg total) by mouth 3 (three) times daily as needed for cough. (Patient not taking: Reported on 3/21/2025) 20 capsule 0    HUMIRA, 2 PEN, 40 MG/0.4ML Subcutaneous Auto-injector Kit  (Patient not taking: Reported on 3/21/2025)      Minoxidil 5 % External Solution Apply to areas of hair loss qd (Patient not taking: Reported on 3/21/2025) 60 mL 1    tacrolimus (PROTOPIC) 0.1 % External Ointment Use at bedtime to areas of psoriasis on face (Patient not taking: Reported on 2025) 30 g 1    omeprazole 20 MG Oral Capsule Delayed Release Take 1 capsule (20 mg total) by mouth every morning before breakfast. (Patient not taking: Reported on 2025) 90 capsule 3    Fluocinolone Acetonide Scalp (DERMA-SMOOTHE/FS SCALP) 0.01 % External Oil Use qhs as directed for scalp psoriasis (Patient not taking: Reported on 3/21/2025) 118 mL 12      Past Medical History:    Asthma (HCC)    Decorative tattoo    Esophageal reflux    Hyperlipidemia     (normal spontaneous  vaginal delivery) (HCC)     (normal spontaneous vaginal delivery) (HCC)     (normal spontaneous vaginal delivery) (HCC)    Psoriasis    Visual impairment    glasses      Social History:  Social History     Socioeconomic History    Marital status:     Number of children: 3   Occupational History    Occupation: [employer]     Employer: [employer]    Tobacco Use    Smoking status: Former     Current packs/day: 0.00     Types: Cigarettes     Quit date: 2016     Years since quittin.9     Passive exposure: Never    Smokeless tobacco: Never   Vaping Use    Vaping status: Never Used   Substance and Sexual Activity    Alcohol use: No     Alcohol/week: 0.0 standard drinks of alcohol    Drug use: No    Sexual activity: Yes     Birth control/protection: Condom   Other Topics Concern    Caffeine Concern Yes     Comment: coffee,soda    Grew up on a farm No    History of tanning Yes    Outdoor occupation No    Pt has a pacemaker No    Pt has a defibrillator No    Breast feeding No    Reaction to local anesthetic No     Social Drivers of Health     Food Insecurity: Not on File (2024)    Received from [facility]    Food Insecurity     Food: 0   Transportation Needs: Not on File (2023)    Received from [facility]    Transportation Needs     Transportation: 0   Stress: Not on File (2023)    Received from [facility]    Stress     Stress: 0   Housing Stability: Not on File (2023)    Received from [facility]    Housing Stability     Housin                 Current Outpatient Medications   Medication Sig Dispense Refill    minoxidil 2.5 MG Oral Tab Take 1/2 tab po every day x 2 weeks, then increase to 1 tab daily 30 tablet 2    hydrOXYzine 10 MG Oral Tab       betamethasone 0.1 % External Cream Apply 1 Application topically daily. 15 g 0    clobetasol 0.05 % External Solution Use bid  As directed to scalp 50 mL 6    Adalimumab (HUMIRA, 2 PEN,) 40 MG/0.4ML Subcutaneous Pen-injector Kit  Inject 40 mg into the skin every 14 (fourteen) days. Maintenance dose 2 each 5    benzonatate 100 MG Oral Cap Take 1 capsule (100 mg total) by mouth 3 (three) times daily as needed for cough. (Patient not taking: Reported on 3/21/2025) 20 capsule 0    HUMIRA, 2 PEN, 40 MG/0.4ML Subcutaneous Auto-injector Kit  (Patient not taking: Reported on 3/21/2025)      Minoxidil 5 % External Solution Apply to areas of hair loss qd (Patient not taking: Reported on 3/21/2025) 60 mL 1    tacrolimus (PROTOPIC) 0.1 % External Ointment Use at bedtime to areas of psoriasis on face (Patient not taking: Reported on 2025) 30 g 1    omeprazole 20 MG Oral Capsule Delayed Release Take 1 capsule (20 mg total) by mouth every morning before breakfast. (Patient not taking: Reported on 2025) 90 capsule 3    Fluocinolone Acetonide Scalp (DERMA-SMOOTHE/FS SCALP) 0.01 % External Oil Use qhs as directed for scalp psoriasis (Patient not taking: Reported on 3/21/2025) 118 mL 12     Allergies:   No Known Allergies    Past Medical History:    Asthma (HCC)    Decorative tattoo    Esophageal reflux    Hyperlipidemia     (normal spontaneous vaginal delivery) (HCC)     (normal spontaneous vaginal delivery) (HCC)     (normal spontaneous vaginal delivery) (HCC)    Psoriasis    Visual impairment    glasses     Past Surgical History:   Procedure Laterality Date    Hemorrhoidectomy  2019    L anterior midline    Tubal ligation       Social History     Socioeconomic History    Marital status:      Spouse name: Not on file    Number of children: 3    Years of education: Not on file    Highest education level: Not on file   Occupational History    Occupation: [employer]     Employer: [employer]    Tobacco Use    Smoking status: Former     Current packs/day: 0.00     Types: Cigarettes     Quit date: 2016     Years since quittin.9     Passive exposure: Never    Smokeless tobacco: Never   Vaping Use    Vaping status:  Never Used   Substance and Sexual Activity    Alcohol use: No     Alcohol/week: 0.0 standard drinks of alcohol    Drug use: No    Sexual activity: Yes     Birth control/protection: Condom   Other Topics Concern     Service Not Asked    Blood Transfusions Not Asked    Caffeine Concern Yes     Comment: coffee,soda    Occupational Exposure Not Asked    Hobby Hazards Not Asked    Sleep Concern Not Asked    Stress Concern Not Asked    Weight Concern Not Asked    Special Diet Not Asked    Back Care Not Asked    Exercise Not Asked    Bike Helmet Not Asked    Seat Belt Not Asked    Self-Exams Not Asked    Grew up on a farm No    History of tanning Yes    Outdoor occupation No    Pt has a pacemaker No    Pt has a defibrillator No    Breast feeding No    Reaction to local anesthetic No   Social History Narrative    Not on file     Social Drivers of Health     Food Insecurity: Not on File (2024)    Received from [facility]    Food Insecurity     Food: 0   Transportation Needs: Not on File (2023)    Received from [facility]    Transportation Needs     Transportation: 0   Stress: Not on File (2023)    Received from [facility]    Stress     Stress: 0   Housing Stability: Not on File (2023)    Received from [facility]    Housing Stability     Housin     Family History   Problem Relation Age of Onset    Cancer Paternal Grandmother         stomach    Gastro-Intestinal Disorder Maternal Grandmother         bleeding ulcer (cause of death)    Asthma Maternal Grandmother     Genetic Disease Maternal Uncle         muscular dystrophy                      HPI :      Chief Complaint   Patient presents with    Alopecia     LOV 25- Pt present for alopecia areata follow up and kenalog injections. Using Clobetasol solution every few days and minoxidil 2.5 MG Oral Tab daily. Patient is starting to see new hair growth on the sides of scalp. Denies any itching of the scalp.      Follow-up psoriasis.  Has  noted in general improvement on Humira currently.  Scalp generally has improved.  Patient's noted increasing hair loss patchy for tiredly over the parietal area.  Patient under more stress recently family numbers visiting.  Notes some itching.  More scaling on the face.  Hair loss came up suddenly has noted some tenderness in these areas.  Nothing else new or different.  Had strep recently, several courses of antibiotics azithromycin and cefdinir recent labs show elevated white count, no anemia eosinophils 0.1% symptoms have improved with antibiotics.  Denies any reaction.  Patient here with daughter and     History of Present Illness    2/28/25  Anette Perez is a 34 year old female with alopecia areata and psoriasis who presents with worsening hair loss.    She is experiencing new and worsening hair loss, describing it as 'like it's falling' and noting visible spots on her scalp. She is concerned about the progression and mentions trying to cover the areas but finding it increasingly difficult. She has purchased a wig due to concerns about potentially losing all her hair. She is currently taking minoxidil tablets, two tablets, which makes her feel more sleepy the following day. Additionally, she uses topical solutions, one of which smells like alcohol, and another that is applied as drops.    She has a history of alopecia areata and psoriasis, for which she is currently taking Humira every two weeks. The medication helps significantly with her arthritis symptoms. She experiences occasional itching, particularly on her arms, which she attributes to psoriasis. She is also taking hydroxyzine for itching, which she refers to as 'the little ones' and uses them at night.    She mentions a rash that developed after a surgery in January, which she initially thought was related to the procedure. However, a doctor indicated it was not related, and she was prescribed a cream to alleviate itching, which has  been somewhat effective. She describes the rash as a 'nail rash' and notes it is improving slowly.    Her blood pressure is stable and she is recovering well from her surgery, except for the ongoing hair loss, which she finds distressing.    3/21/25  Anette Perez is a 34 year old female who presents with hair regrowth concerns.    She is experiencing hair regrowth concerns, particularly noting improvement on the sides of her scalp. She describes bald spots that are noticeable when the wind hits her scalp but feels the condition is stable and improving overall. She is actively monitoring areas where new hair has not yet appeared.    Her current treatment regimen includes a liquid solution containing minoxidil, applied twice daily, which helps with itching and inflammation. She has no issues with the minoxidil and has refills available. Additionally, she is taking an oral medication, one tablet daily, which she has been using for several months to encourage hair regrowth.    Stress has been a factor in her condition, and she acknowledges the need to manage this aspect. She is pleased with the progress, noting significant hair regrowth and overall improvement in her condition.      Patient presents with concerns above.    Past notes/ records and appropriate/relevant lab results including pathology and past body maps reviewed. Updated and new information noted in current visit.       ROS:    Denies any other systemic complaints.  No fevers, chills, night sweats, sensitivity to the sun, deeper lumps or bumps.  No other skin complaints.  History, medications, allergies as noted.    Physical examination: Patient  well-developed well-nourished, alert oriented in no acute distress.  Exam of involved, appropriate areas of skin performed, including scalp, head, neck, face,nails, hair, external eyes, including conjunctival mucosa, eyelids, lips, external ears, back, chest, abdomen, arms, legs, palms.  Remarkable for  lesions as noted   See map for details  Patchy areas of hair loss bilateral parietal scalp right greater than left.  Erythema and scaling over the glabella nasolabial folds alar creases     ASSESSMENT AND PLAN:     Encounter Diagnoses   Name Primary?    Alopecia areata Yes    Medication monitoring encounter     Psoriasis vulgaris          Physical Exam  SKIN: Hair regrowth present.          Results      Assessment & Plan      2/28/25  Alopecia Areata  Worsening alopecia areata with new and expanding areas of hair loss, likely exacerbated by recent surgery. Currently on Humira for psoriasis and psoriatic arthritis, limiting other medication options. Discussed oral minoxidil for hair growth, with potential side effects including facial hair growth. Newer medications like Rinvoq or Cibinqo might help both conditions but lack sufficient data. Stopping Humira is not advisable due to its necessity for psoriasis and arthritis control.  - Continue oral minoxidil at 2.5 mg daily, start with half a tablet and increase to a full tablet after 1-2 weeks if tolerated  - Continue topical minoxidil solutions  - Administer intralesional corticosteroid injections every 3-4 weeks  - Consider adding hydroxychloroquine if no improvement    Psoriasis  Well-controlled with Humira. Reports improvement in scalp psoriasis with no significant flares.  - Continue Humira as prescribed    Psoriatic Arthritis  Managed with Humira, effectively controlling symptoms.  - Continue Humira as prescribed    General Health Maintenance  Overall good health post-surgery.  - Monitor blood pressure regularly    Follow-up  - Follow up in 3-4 weeks for intralesional corticosteroid injections  - Monitor response to oral minoxidil and adjust dosage as needed.    3/21/25  Alopecia Areata  She is experiencing hair regrowth, particularly on the sides, indicating improvement. The condition is well-managed with minoxidil, which she tolerates without issues. Increasing  the dosage is not advised due to the risk of unwanted facial hair growth. The liquid minoxidil solution, containing cortisone, is recommended twice daily to address inflammation. Continued use is expected to promote further hair regrowth over the next four to five months.  - Continue minoxidil 5% topical solution twice daily  - Monitor hair regrowth and condition management  - Reassess in follow-up appointment  Overall significant regrowth still some patchy areas continue monitoring  Recording duration: 11 minutes      Assessment / plan:  Acneform lesions have improved.  Continue monitoring.    History of psoriasis.  Improved on Humira currently still active areas on the face scalp okay   Psoriasis.  Plaque-type.  Currently less than 5% BSA involvement.  Prior to initiating Humira 30-% body surface involvement.  Will treat with medications and grid.  Overall skincare, liberal use of emollients discussed.  Consider more aggressive therapy if worsening.Patient will let us know how they are doing over the next several weeks.  Await clinical response to above therapy.  Recheck in 2-3 months if no improvement.    Widespread lesions especially over the scalp, previously currently clear    Monitor for guttate flare with recent strep.  Continue Humira  Medically necessary given widespread psoriasis worsening, scalp involvement, symptoms interfering with daily function, work, severe itching.    For psoriasis on face.  Will add tacrolimus.  Clobetasol scalp    Will need follow-up in 4 to 6 months, continue quant gold due at least annually      Alopecia areata worsening and her above.  Intralesional Kenalog 10 mg per mL total of 2.0 mL injected to above sites.  At bilateral parietal scalp autoimmune nature possibly occurring with other auto inflammatory conditions including thyroid problems as well as association with atopic conditions, potentially relapsing, recurrent persistent condition discussed.  Pathophysiology reviewed.   See grid for topicals.  May consider minoxidil.  No labs presently consider thyroid function, RACHELE, CBC if persists.  Topicals including newer medications, various forms of topical steroids, systemic medications reviewed.  Followup one month or p.r.n.  Clobetasol twice daily to patchy areas on the scalp, minoxidil solution twice daily  Await response recheck in 6 weeks if needed stress likely factor other labs reviewed consider additional labs if worsening    Pathophysiology discussed with patient.  Therapeutic options reviewed.  See  Medications in grid.  Instructions reviewed at length.     General skin care questions answered.   Reassurance regarding benign skin lesions.Signs and symptoms of skin cancer, ABCDE's of melanoma briefly reviewed.  Sunscreen use (broad-spectrum, ideally mineral, UVA UVB coverage SPF 30 or greater recommended), sun protection, encouraged.  Followup as noted in rtc or p.r.n.  Encounter Times   Including precharting, reviewing chart, prior notes obtaining history, medical exam, notes on body map, plan, counseling, discussion of therapeutic options, patient education, orders more than half total time spent in face to face time with patient.  My total time spent caring for the patient on the day of the encounter: 35 minutes       The patient indicates understanding of these issues and agrees to the plan.  The patient is asked to return as noted in follow-up /as noted above    This note was generated using Dragon voice recognition software.  Please contact me regarding any confusion resulting from errors in recognition.  Note to patient and family: The 21st Century Cures Act makes medical notes like these available to patients. However, be advised this is a medical document. It is intended as [id number] communication and monitoring of a patient's care needs. It is written in medical language and may contain abbreviations or verbiage that are unfamiliar. It may appear blunt or direct.  Medical documents are intended to carry relevant information, facts as evident and the clinical opinion of the practitioner.  No orders of the defined types were placed in this encounter.      Meds & Refills for this Visit:   Requested Prescriptions      No prescriptions requested or ordered in this encounter       Encounter Diagnoses   Name Primary?    Psoriasis vulgaris Yes    Medication monitoring encounter        No orders of the defined types were placed in this encounter.      Results From Past 48 Hours:  No results found for this or any previous visit (from the past 48 hours).    Meds This Visit:      Imaging Orders:  None     Referral Orders:  No orders of the defined types were placed in this encounter.

## 2025-04-04 ENCOUNTER — OFFICE VISIT (OUTPATIENT)
Dept: OBGYN CLINIC | Facility: CLINIC | Age: 35
End: 2025-04-04

## 2025-04-04 ENCOUNTER — LAB ENCOUNTER (OUTPATIENT)
Dept: LAB | Facility: HOSPITAL | Age: 35
End: 2025-04-04
Attending: OBSTETRICS & GYNECOLOGY
Payer: MEDICAID

## 2025-04-04 ENCOUNTER — TELEPHONE (OUTPATIENT)
Dept: OBGYN CLINIC | Facility: CLINIC | Age: 35
End: 2025-04-04

## 2025-04-04 VITALS
SYSTOLIC BLOOD PRESSURE: 116 MMHG | HEIGHT: 63 IN | DIASTOLIC BLOOD PRESSURE: 76 MMHG | WEIGHT: 193 LBS | HEART RATE: 86 BPM | BODY MASS INDEX: 34.2 KG/M2

## 2025-04-04 DIAGNOSIS — N92.1 MENORRHAGIA WITH IRREGULAR CYCLE: Primary | ICD-10-CM

## 2025-04-04 DIAGNOSIS — N92.1 MENORRHAGIA WITH IRREGULAR CYCLE: ICD-10-CM

## 2025-04-04 LAB
DEPRECATED RDW RBC AUTO: 39.4 FL (ref 35.1–46.3)
ERYTHROCYTE [DISTWIDTH] IN BLOOD BY AUTOMATED COUNT: 12.4 % (ref 11–15)
HCT VFR BLD AUTO: 39.3 %
HGB BLD-MCNC: 12.7 G/DL
MCH RBC QN AUTO: 28.3 PG (ref 26–34)
MCHC RBC AUTO-ENTMCNC: 32.3 G/DL (ref 31–37)
MCV RBC AUTO: 87.7 FL
PLATELET # BLD AUTO: 258 10(3)UL (ref 150–450)
RBC # BLD AUTO: 4.48 X10(6)UL
TSI SER-ACNC: 0.61 UIU/ML (ref 0.55–4.78)
WBC # BLD AUTO: 9 X10(3) UL (ref 4–11)

## 2025-04-04 PROCEDURE — 36415 COLL VENOUS BLD VENIPUNCTURE: CPT

## 2025-04-04 PROCEDURE — 84443 ASSAY THYROID STIM HORMONE: CPT

## 2025-04-04 PROCEDURE — 99213 OFFICE O/P EST LOW 20 MIN: CPT | Performed by: OBSTETRICS & GYNECOLOGY

## 2025-04-04 PROCEDURE — 85027 COMPLETE CBC AUTOMATED: CPT

## 2025-04-04 NOTE — TELEPHONE ENCOUNTER
I spoke with the patient, confirmed date and time for her procedure. I also sent a minor surgical case letter via [employer]     Surgical case request has been sent     Via [employer], prior authorization is not needed. Reference number is [id number]

## 2025-04-04 NOTE — TELEPHONE ENCOUNTER
Please schedule the following surgery:    Procedure: hysteroscopy novasure endometrial ablation  Assist: (Y/N or none)   Date:   When I am on call  Dr armstrong  Dx:menorrhagia  Pre-op appt: (Y/N or n/a)   Admission type: (IN/OUT/[facility])   Department of discharge(SDS/Floor):   Expected length of stay:   Procedure length time (please enter amount you are requesting): 1hour  Recovery time (patients always ask):   Medical Clearance: (Y/N)   Special Requests:     Surgical prophylaxis:        ALL Medicaid/including BCBS community: Tubal/ Hyst form MUST be signed (30 days):     Message to nurses:

## 2025-04-04 NOTE — PROGRESS NOTES
Anette Perez is a 34 year old female  Patient's last menstrual period was 2025 (exact date).   Chief Complaint   Patient presents with    Gyn Exam     Pt here for bleeding since 3/16 with blood clots    Hx tubal ligation. 3/16 had menses daily since then. Pt got depoprovera last month.  Hx endometrial biopsy one year ago normal.   OBSTETRICS HISTORY:     OB History    Para Term  AB Living   4 3 3   1 3   SAB IAB Ectopic Multiple Live Births   1       3      # Outcome Date GA Lbr Porter/2nd Weight Sex Type Anes PTL Lv   4 Term 20 39w3d 17:05 / 00:59 7 lb 13.8 oz (3.565 kg) F Vag-Vacuum EPI N STEVEN      Complications: Group B beta strep +, Variable decelerations, Fetal intolerance to labor   3 SAB 17      None N       Birth Comments: Had a D&C    2 Term 13 39w0d  7 lb 4 oz (3.289 kg) F Vag-Spont EPI N STEVEN   1 Term 11 39w0d  6 lb 4 oz (2.835 kg) F Vag-Spont EPI N STEVEN       GYNE HISTORY:     Hx Prior Abnormal Pap: No  Pap Date: 23  Pap Result Notes: wnl   Menarche: 12 (2025 10:23 AM)  Period Cycle (Days): 28 (2025 10:23 AM)  Period Duration (Days): 5 (2025 10:23 AM)  Period Flow: moderate (2025 10:23 AM)  Use of Birth Control (if yes, specify type): Tubal Ligation (2025 10:23 AM)  Hx Prior Abnormal Pap: No (2025 10:23 AM)  Pap Date: 23 (2025 10:23 AM)  Pap Result Notes: wnl (2025 10:23 AM)        Latest Ref Rng & Units 2023     3:20 PM 2020     3:44 PM   RECENT PAP RESULTS   INTERPRETATION/RESULT: Negative for intraepithelial lesion or malignancy Negative for intraepithelial lesion or malignancy  Negative for intraepithelial lesion or malignancy    HPV Negative Negative  Negative          MEDICAL HISTORY:     Past Medical History:    Asthma (HCC)    Decorative tattoo    Esophageal reflux    Hyperlipidemia     (normal spontaneous vaginal delivery) (HCC)     (normal spontaneous vaginal delivery) (HCC)      (normal spontaneous vaginal delivery) (HCC)    Psoriasis    Visual impairment    glasses       SURGICAL HISTORY:     Past Surgical History:   Procedure Laterality Date    Hemorrhoidectomy  2019    L anterior midline    Removal of ovarian cyst(s)      Tubal ligation         SOCIAL HISTORY:     Social History     Socioeconomic History    Marital status:     Number of children: 3   Occupational History    Occupation: [employer]     Employer: [employer]    Tobacco Use    Smoking status: Former     Current packs/day: 0.00     Types: Cigarettes     Quit date: 2016     Years since quittin.9     Passive exposure: Never    Smokeless tobacco: Never   Vaping Use    Vaping status: Never Used   Substance and Sexual Activity    Alcohol use: No     Alcohol/week: 0.0 standard drinks of alcohol    Drug use: No    Sexual activity: Yes     Birth control/protection: Condom   Other Topics Concern    Caffeine Concern Yes     Comment: coffee,soda    Grew up on a farm No    History of tanning Yes    Outdoor occupation No    Pt has a pacemaker No    Pt has a defibrillator No    Breast feeding No    Reaction to local anesthetic No     Social Drivers of Health     Food Insecurity: Not on File (2024)    Received from [email or web address]    Food Insecurity     Food: 0   Transportation Needs: Not on File (2023)    Received from [facility]    Transportation Needs     Transportation: 0   Stress: Not on File (2023)    Received from [facility]    Stress     Stress: 0   Housing Stability: Not on File (2023)    Received from [facility]    Housing Stability     Housin        FAMILY HISTORY:     Family History   Problem Relation Age of Onset    Cancer Paternal Grandmother         stomach    Gastro-Intestinal Disorder Maternal Grandmother         bleeding ulcer (cause of death)    Asthma Maternal Grandmother     Genetic Disease Maternal Uncle         muscular dystrophy       MEDICATIONS:       Current  Outpatient Medications:     benzonatate 100 MG Oral Cap, Take 1 capsule (100 mg total) by mouth 3 (three) times daily as needed for cough., Disp: 20 capsule, Rfl: 0    hydrOXYzine 10 MG Oral Tab, , Disp: , Rfl:     betamethasone 0.1 % External Cream, Apply 1 Application topically daily., Disp: 15 g, Rfl: 0    HUMIRA, 2 PEN, 40 MG/0.4ML Subcutaneous Auto-injector Kit, , Disp: , Rfl:     clobetasol 0.05 % External Solution, Use bid  As directed to scalp, Disp: 50 mL, Rfl: 6    Minoxidil 5 % External Solution, Apply to areas of hair loss qd, Disp: 60 mL, Rfl: 1    tacrolimus (PROTOPIC) 0.1 % External Ointment, Use at bedtime to areas of psoriasis on face, Disp: 30 g, Rfl: 1    Adalimumab (HUMIRA, 2 PEN,) 40 MG/0.4ML Subcutaneous Pen-injector Kit, Inject 40 mg into the skin every 14 (fourteen) days. Maintenance dose, Disp: 2 each, Rfl: 5    omeprazole 20 MG Oral Capsule Delayed Release, Take 1 capsule (20 mg total) by mouth every morning before breakfast., Disp: 90 capsule, Rfl: 3    Fluocinolone Acetonide Scalp (DERMA-SMOOTHE/FS SCALP) 0.01 % External Oil, Use qhs as directed for scalp psoriasis, Disp: 118 mL, Rfl: 12    ALLERGIES:     Allergies[1]      REVIEW OF SYSTEMS:     Constitutional:    denies fever / chills  Eyes:     denies blurred or double vision  Cardiovascular:  denies chest pain or palpitations  Respiratory:    denies shortness of breath  Gastrointestinal:  denies severe abdominal pain, frequent diarrhea or constipation, nausea / vomiting  Genitourinary:    denies dysuria, bothersome incontinence  Skin/Breast:   denies any breast pain, lumps, or discharge  Neurological:    denies frequent severe headaches  Psychiatric:   denies depression or anxiety, thoughts of harming self or others  Heme/Lymph:    denies easy bruising or bleeding      PHYSICAL EXAM:   Blood pressure 116/76, pulse 86, height 5' 3\" (1.6 m), weight 193 lb (87.5 kg), last menstrual period 03/16/2025, not currently  breastfeeding.  Constitutional:  well developed, well nourished  Head/Face:  normocephalic        ASSESSMENT & PLAN:     Anette was seen today for gyn exam.    Diagnoses and all orders for this visit:    Menorrhagia with irregular cycle  -     CBC, Platelet; No Differential; Future  -     TSH W Reflex To Free T4; Future    I spent 30 minutes face-to-face with the patient, over half of the time in discussion and counseling of the diagnosis, work-up steps, and potential treatment plans.  This time was also spent for chart review including obtaining and/or reviewing additional medical history, prior labs and radiology testing as well as documenting clinical information in the electronic medical record, independently interpreting results and communicating results to the patient and/or family and coordinating care.  I believe that the irregular bleeding is a side effect of the Depo-Provera.  I told her that the Depo vera can cause weight gain and that she has 1 option of continuing the Depo-Provera or alternatively she could have a NovaSure endometrial ablation.  I told her that it is possible that if the ablation is incomplete that she might have blockage of menses which could lead to severe pain and a hysterectomy and there are risks of a hysterectomy especially with her surgical history.  She decided that she does want a NovaSure ablation.  She has a history of a tubal ligation so she is a good candidate and she has a normal size uterus.  I will schedule.  I also offered the birth control pill versus an IUD instead of the surgery and she wants to do the surgery instead of those options      FOLLOW-UP     No follow-ups on file.      David Zamora MD  4/4/2025         [1] No Known Allergies

## 2025-04-11 ENCOUNTER — OFFICE VISIT (OUTPATIENT)
Dept: DERMATOLOGY CLINIC | Facility: CLINIC | Age: 35
End: 2025-04-11

## 2025-04-11 DIAGNOSIS — Z51.81 MEDICATION MONITORING ENCOUNTER: ICD-10-CM

## 2025-04-11 DIAGNOSIS — Z51.81 ENCOUNTER FOR MONITORING IMMUNOSUPPRESSIVE MEDICATION THERAPY CAUSING IMMUNODEFICIENCY (HCC): ICD-10-CM

## 2025-04-11 DIAGNOSIS — Z79.60 ENCOUNTER FOR MONITORING IMMUNOSUPPRESSIVE MEDICATION THERAPY CAUSING IMMUNODEFICIENCY (HCC): ICD-10-CM

## 2025-04-11 DIAGNOSIS — L65.9 HAIR LOSS: ICD-10-CM

## 2025-04-11 DIAGNOSIS — L63.9 ALOPECIA AREATA: Primary | ICD-10-CM

## 2025-04-11 DIAGNOSIS — L40.0 PSORIASIS VULGARIS: ICD-10-CM

## 2025-04-11 DIAGNOSIS — D84.821 ENCOUNTER FOR MONITORING IMMUNOSUPPRESSIVE MEDICATION THERAPY CAUSING IMMUNODEFICIENCY (HCC): ICD-10-CM

## 2025-04-11 PROCEDURE — 99214 OFFICE O/P EST MOD 30 MIN: CPT | Performed by: DERMATOLOGY

## 2025-04-11 NOTE — PROGRESS NOTES
The following individual(s) verbally consented to be recorded using ambient AI listening technology and understand that they can each withdraw their consent to this listening technology at any point by asking the clinician to turn off or pause the recording:    Patient name: Anetteamy Betancurmanan Perez  Additional names:  Josafat Alaniz

## 2025-04-16 NOTE — DISCHARGE INSTRUCTIONS
You may have cramps and bleeding for short time after the procedure. This is normal. Use pads instead of tampons.  Pelvic rest (nothing in the vagina) no sex, no tampons, no vaginal medication    When to call your healthcare provider  Call your healthcare provider if any of the following occur:  Heavy bleeding (more than 1 pad an hour for 2 or more hours)  A fever of 100.4°F ( 38.0°C) or higher, or as directed by your provider  Increasing belly (abdominal) pain or soreness  Bad-smelling discharge       HOME INSTRUCTIONS  [facility] HOME CARE INSTRUCTIONS: POST-OP ANESTHESIA  The medication that you received for sedation or general anesthesia can last up to 24 hours. Your judgment and reflexes may be altered, even if you feel like your normal self.      We Recommend:   Do not drive any motor vehicle or bicycle   Avoid mowing the lawn, playing sports, or working with power tools/applicances (power saws, electric knives or mixers)   That you have someone stay with you on your first night home   Do not drink alcohol or take sleeping pills or tranquilizers   Do not sign legal documents within 24 hours of your procedure   If you had a nerve block for your surgery, take extra care not to put any pressure on your arm or hand for 24 hours    It is normal:  For you to have a sore throat if you had a breathing tube during surgery (while you were asleep!). The sore throat should get better within 48 hours. You can gargle with warm salt water (1/2 tsp in 4 oz warm water) or use a throat lozenge for comfort  To feel muscle aches or soreness especially in the abdomen, chest or neck. The achy feeling should go away in the next 24 hours  To feel weak, sleepy or \"wiped out\". Your should start feeling better in the next 24 hours.   To experience mild discomforts such as sore lip or tongue, headache, cramps, gas pains or a bloated feeling in your abdomen.   To experience mild back pain or soreness for a day or two if you had spinal or  epidural anesthesia.   If you had laparoscopic surgery, to feel shoulder pain or discomfort on the day of surgery.   For some patients to have nausea after surgery/anesthesia    If you feel nausea or experience vomiting:   Try to move around less.   Eat less than usual or drink only liquids until the next morning   Nausea should resolve in about 24 hours    If you have a problem when you are at home:    Call your surgeons office   Discharge Instructions: After Your Surgery  You’ve just had surgery. During surgery, you were given medicine called anesthesia to keep you relaxed and free of pain. After surgery, you may have some pain or nausea. This is common. Here are some tips for feeling better and getting well after surgery.   Going home  Your healthcare provider will show you how to take care of yourself when you go home. They'll also answer your questions. Have an adult family member or friend drive you home. For the first 24 hours after your surgery:   Don't drive or use heavy equipment.  Don't make important decisions or sign legal papers.  Take medicines as directed.  Don't drink alcohol.  Have someone stay with you, if needed. They can watch for problems and help keep you safe.  Be sure to go to all follow-up visits with your healthcare provider. And rest after your surgery for as long as your provider tells you to.   Coping with pain  If you have pain after surgery, pain medicine will help you feel better. Take it as directed, before pain becomes severe. Also, ask your healthcare provider or pharmacist about other ways to control pain. This might be with heat, ice, or relaxation. And follow any other instructions your surgeon or nurse gives you.      Stay on schedule with your medicine.     Tips for taking pain medicine  To get the best relief possible, remember these points:   Pain medicines can upset your stomach. Taking them with a little food may help.  Most pain relievers taken by mouth need at least 20  to 30 minutes to start to work.  Don't wait till your pain becomes severe before you take your medicine. Try to time your medicine so that you can take it before starting an activity. This might be before you get dressed, go for a walk, or sit down for dinner.  Constipation is a common side effect of some pain medicines. Call your healthcare provider before taking any medicines such as laxatives or stool softeners to help ease constipation. Also ask if you should skip any foods. Drinking lots of fluids and eating foods such as fruits and vegetables that are high in fiber can also help. Remember, don't take laxatives unless your surgeon has prescribed them.  Drinking alcohol and taking pain medicine can cause dizziness and slow your breathing. It can even be deadly. Don't drink alcohol while taking pain medicine.  Pain medicine can make you react more slowly to things. Don't drive or run machinery while taking pain medicine.  Your healthcare provider may tell you to take acetaminophen to help ease your pain. Ask them how much you're supposed to take each day. Acetaminophen or other pain relievers may interact with your prescription medicines or other over-the-counter (OTC) medicines. Some prescription medicines have acetaminophen and other ingredients in them. Using both prescription and OTC acetaminophen for pain can cause you to accidentally overdose. Read the labels on your OTC medicines with care. This will help you to clearly know the list of ingredients, how much to take, and any warnings. It may also help you not take too much acetaminophen. If you have questions or don't understand the information, ask your pharmacist or healthcare provider to explain it to you before you take the OTC medicine.   Managing nausea  Some people have an upset stomach (nausea) after surgery. This is often because of anesthesia, pain, or pain medicine, less movement of food in the stomach, or the stress of surgery. These tips will  help you handle nausea and eat healthy foods as you get better. If you were on a special food plan before surgery, ask your healthcare provider if you should follow it while you get better. Check with your provider on how your eating should progress. It may depend on the surgery you had. These general tips may help:   Don't push yourself to eat. Your body will tell you when to eat and how much.  Start off with clear liquids and soup. They're easier to digest.  Next try semi-solid foods as you feel ready. These include mashed potatoes, applesauce, and gelatin.  Slowly move to solid foods. Don’t eat fatty, rich, or spicy foods at first.  Don't force yourself to have 3 large meals a day. Instead eat smaller amounts more often.  Take pain medicines with a small amount of solid food, such as crackers or toast. This helps prevent nausea.  When to call your healthcare provider  Call your healthcare provider right away if you have any of these:   You still have too much pain, or the pain gets worse, after taking the medicine. The medicine may not be strong enough. Or there may be a complication from the surgery.  You feel too sleepy, dizzy, or groggy. The medicine may be too strong.  Side effects such as nausea or vomiting. Your healthcare provider may advise taking other medicines to .  Skin changes such as rash, itching, or hives. This may mean you have an allergic reaction. Your provider may advise taking other medicines.  The incision looks different (for instance, part of it opens up).  Bleeding or fluid leaking from the incision site, and weren't told to expect that.  Fever of 100.4°F (38°C) or higher, or as directed by your provider.  Call 911  Call 911 right away if you have:   Trouble breathing  Facial swelling    If you have obstructive sleep apnea   You were given anesthesia medicine during surgery to keep you comfortable and free of pain. After surgery, you may have more apnea spells because of this medicine and  other medicines you were given. The spells may last longer than normal.    At home:  Keep using the continuous positive airway pressure (CPAP) device when you sleep. Unless your healthcare provider tells you not to, use it when you sleep, day or night. CPAP is a common device used to treat obstructive sleep apnea.  Talk with your provider before taking any pain medicine, muscle relaxants, or sedatives. Your provider will tell you about the possible dangers of taking these medicines.  Contact your provider if your sleeping changes a lot even when taking medicines as directed.  Jessica last reviewed this educational content on 10/1/2021  © 9937-9918 [employer]. All rights reserved. This information is not intended as a substitute for professional medical care. Always follow your healthcare professional's instructions.

## 2025-04-18 ENCOUNTER — ANESTHESIA (OUTPATIENT)
Dept: SURGERY | Facility: HOSPITAL | Age: 35
End: 2025-04-18
Payer: MEDICAID

## 2025-04-18 ENCOUNTER — ANESTHESIA EVENT (OUTPATIENT)
Dept: SURGERY | Facility: HOSPITAL | Age: 35
End: 2025-04-18
Payer: MEDICAID

## 2025-04-18 ENCOUNTER — HOSPITAL ENCOUNTER (OUTPATIENT)
Facility: HOSPITAL | Age: 35
Setting detail: HOSPITAL OUTPATIENT SURGERY
Discharge: HOME | End: 2025-04-18
Attending: OBSTETRICS & GYNECOLOGY | Admitting: OBSTETRICS & GYNECOLOGY
Payer: MEDICAID

## 2025-04-18 VITALS
HEART RATE: 69 BPM | OXYGEN SATURATION: 95 % | TEMPERATURE: 98 F | RESPIRATION RATE: 14 BRPM | HEIGHT: 63 IN | BODY MASS INDEX: 33.84 KG/M2 | WEIGHT: 191 LBS | DIASTOLIC BLOOD PRESSURE: 66 MMHG | SYSTOLIC BLOOD PRESSURE: 115 MMHG

## 2025-04-18 DIAGNOSIS — N92.1 MENORRHAGIA WITH IRREGULAR CYCLE: ICD-10-CM

## 2025-04-18 LAB — B-HCG UR QL: NEGATIVE

## 2025-04-18 PROCEDURE — 58563 HYSTEROSCOPY ABLATION: CPT | Performed by: OBSTETRICS & GYNECOLOGY

## 2025-04-18 RX ORDER — FAMOTIDINE 10 MG/ML
20 INJECTION, SOLUTION INTRAVENOUS ONCE
Status: COMPLETED | OUTPATIENT
Start: 2025-04-18 | End: 2025-04-18

## 2025-04-18 RX ORDER — SODIUM CHLORIDE, SODIUM LACTATE, POTASSIUM CHLORIDE, CALCIUM CHLORIDE 600; 310; 30; 20 MG/100ML; MG/100ML; MG/100ML; MG/100ML
INJECTION, SOLUTION INTRAVENOUS CONTINUOUS
Status: DISCONTINUED | OUTPATIENT
Start: 2025-04-18 | End: 2025-04-18

## 2025-04-18 RX ORDER — HYDROMORPHONE HYDROCHLORIDE 1 MG/ML
0.6 INJECTION, SOLUTION INTRAMUSCULAR; INTRAVENOUS; SUBCUTANEOUS EVERY 5 MIN PRN
Status: DISCONTINUED | OUTPATIENT
Start: 2025-04-18 | End: 2025-04-18

## 2025-04-18 RX ORDER — ACETAMINOPHEN 325 MG/1
650 TABLET ORAL EVERY 6 HOURS PRN
Status: DISCONTINUED | OUTPATIENT
Start: 2025-04-18 | End: 2025-04-18

## 2025-04-18 RX ORDER — HYDROMORPHONE HYDROCHLORIDE 1 MG/ML
0.4 INJECTION, SOLUTION INTRAMUSCULAR; INTRAVENOUS; SUBCUTANEOUS EVERY 5 MIN PRN
Status: DISCONTINUED | OUTPATIENT
Start: 2025-04-18 | End: 2025-04-18

## 2025-04-18 RX ORDER — FAMOTIDINE 20 MG/1
20 TABLET, FILM COATED ORAL ONCE
Status: COMPLETED | OUTPATIENT
Start: 2025-04-18 | End: 2025-04-18

## 2025-04-18 RX ORDER — DEXAMETHASONE SODIUM PHOSPHATE 4 MG/ML
VIAL (ML) INJECTION AS NEEDED
Status: DISCONTINUED | OUTPATIENT
Start: 2025-04-18 | End: 2025-04-18 | Stop reason: SURG

## 2025-04-18 RX ORDER — MORPHINE SULFATE 4 MG/ML
4 INJECTION, SOLUTION INTRAMUSCULAR; INTRAVENOUS EVERY 10 MIN PRN
Status: DISCONTINUED | OUTPATIENT
Start: 2025-04-18 | End: 2025-04-18

## 2025-04-18 RX ORDER — ONDANSETRON 2 MG/ML
INJECTION INTRAMUSCULAR; INTRAVENOUS AS NEEDED
Status: DISCONTINUED | OUTPATIENT
Start: 2025-04-18 | End: 2025-04-18 | Stop reason: SURG

## 2025-04-18 RX ORDER — HYDROCODONE BITARTRATE AND ACETAMINOPHEN 5; 325 MG/1; MG/1
1 TABLET ORAL EVERY 6 HOURS PRN
Status: DISCONTINUED | OUTPATIENT
Start: 2025-04-18 | End: 2025-04-18

## 2025-04-18 RX ORDER — METOCLOPRAMIDE HYDROCHLORIDE 5 MG/ML
10 INJECTION INTRAMUSCULAR; INTRAVENOUS ONCE
Status: COMPLETED | OUTPATIENT
Start: 2025-04-18 | End: 2025-04-18

## 2025-04-18 RX ORDER — LIDOCAINE HYDROCHLORIDE 10 MG/ML
INJECTION, SOLUTION EPIDURAL; INFILTRATION; INTRACAUDAL; PERINEURAL AS NEEDED
Status: DISCONTINUED | OUTPATIENT
Start: 2025-04-18 | End: 2025-04-18 | Stop reason: SURG

## 2025-04-18 RX ORDER — ONDANSETRON 4 MG/1
4 TABLET, FILM COATED ORAL EVERY 8 HOURS PRN
Status: DISCONTINUED | OUTPATIENT
Start: 2025-04-18 | End: 2025-04-18

## 2025-04-18 RX ORDER — MIDAZOLAM HYDROCHLORIDE 1 MG/ML
INJECTION INTRAMUSCULAR; INTRAVENOUS AS NEEDED
Status: DISCONTINUED | OUTPATIENT
Start: 2025-04-18 | End: 2025-04-18 | Stop reason: SURG

## 2025-04-18 RX ORDER — METOCLOPRAMIDE 10 MG/1
10 TABLET ORAL ONCE
Status: COMPLETED | OUTPATIENT
Start: 2025-04-18 | End: 2025-04-18

## 2025-04-18 RX ORDER — MORPHINE SULFATE 4 MG/ML
2 INJECTION, SOLUTION INTRAMUSCULAR; INTRAVENOUS EVERY 10 MIN PRN
Status: DISCONTINUED | OUTPATIENT
Start: 2025-04-18 | End: 2025-04-18

## 2025-04-18 RX ORDER — ONDANSETRON 2 MG/ML
4 INJECTION INTRAMUSCULAR; INTRAVENOUS EVERY 8 HOURS PRN
Status: DISCONTINUED | OUTPATIENT
Start: 2025-04-18 | End: 2025-04-18

## 2025-04-18 RX ORDER — KETOROLAC TROMETHAMINE 30 MG/ML
30 INJECTION, SOLUTION INTRAMUSCULAR; INTRAVENOUS ONCE
Status: COMPLETED | OUTPATIENT
Start: 2025-04-18 | End: 2025-04-18

## 2025-04-18 RX ORDER — HYDROCODONE BITARTRATE AND ACETAMINOPHEN 5; 325 MG/1; MG/1
2 TABLET ORAL EVERY 6 HOURS PRN
Status: DISCONTINUED | OUTPATIENT
Start: 2025-04-18 | End: 2025-04-18

## 2025-04-18 RX ORDER — MORPHINE SULFATE 10 MG/ML
6 INJECTION, SOLUTION INTRAMUSCULAR; INTRAVENOUS EVERY 10 MIN PRN
Status: DISCONTINUED | OUTPATIENT
Start: 2025-04-18 | End: 2025-04-18

## 2025-04-18 RX ORDER — IBUPROFEN 600 MG/1
600 TABLET, FILM COATED ORAL EVERY 6 HOURS PRN
Qty: 12 TABLET | Refills: 0 | Status: SHIPPED | OUTPATIENT
Start: 2025-04-18

## 2025-04-18 RX ORDER — NALOXONE HYDROCHLORIDE 0.4 MG/ML
80 INJECTION, SOLUTION INTRAMUSCULAR; INTRAVENOUS; SUBCUTANEOUS AS NEEDED
Status: DISCONTINUED | OUTPATIENT
Start: 2025-04-18 | End: 2025-04-18

## 2025-04-18 RX ORDER — ACETAMINOPHEN 500 MG
1000 TABLET ORAL ONCE
Status: COMPLETED | OUTPATIENT
Start: 2025-04-18 | End: 2025-04-18

## 2025-04-18 RX ORDER — HYDROMORPHONE HYDROCHLORIDE 1 MG/ML
0.2 INJECTION, SOLUTION INTRAMUSCULAR; INTRAVENOUS; SUBCUTANEOUS EVERY 5 MIN PRN
Status: DISCONTINUED | OUTPATIENT
Start: 2025-04-18 | End: 2025-04-18

## 2025-04-18 RX ADMIN — LIDOCAINE HYDROCHLORIDE 50 MG: 10 INJECTION, SOLUTION EPIDURAL; INFILTRATION; INTRACAUDAL; PERINEURAL at 11:15:00

## 2025-04-18 RX ADMIN — DEXAMETHASONE SODIUM PHOSPHATE 4 MG: 4 MG/ML VIAL (ML) INJECTION at 11:26:00

## 2025-04-18 RX ADMIN — MIDAZOLAM HYDROCHLORIDE 2 MG: 1 INJECTION INTRAMUSCULAR; INTRAVENOUS at 11:09:00

## 2025-04-18 RX ADMIN — ONDANSETRON 4 MG: 2 INJECTION INTRAMUSCULAR; INTRAVENOUS at 11:26:00

## 2025-04-18 RX ADMIN — SODIUM CHLORIDE, SODIUM LACTATE, POTASSIUM CHLORIDE, CALCIUM CHLORIDE: 600; 310; 30; 20 INJECTION, SOLUTION INTRAVENOUS at 11:55:00

## 2025-04-18 NOTE — ANESTHESIA PROCEDURE NOTES
Airway  Date/Time: 4/18/2025 11:16 AM  Reason: elective    Airway not difficult    General Information and Staff   Patient location during procedure: OR  Resident/CRNA: Norma Scott CRNA  Performed: CRNA   Performed by: Norma Scott CRNA  Authorized by: Cely Saeed MD        Indications and Patient Condition  Indications for airway management: anesthesia  Sedation level: deep      Preoxygenated: yesPatient position: sniffing  MILS maintained throughout    Mask difficulty assessment: 1 - vent by mask  No planned trial extubation    Final Airway Details    Final airway type: supraglottic airway      Successful airway: classic  Size: 4     Number of attempts at approach: 1  Number of other approaches attempted: 0

## 2025-04-18 NOTE — OPERATIVE REPORT
[facility]  part of [facility]    Operative Note         Anette Perez Location: OR   [facility] [id number] MRN [id number]   Admission Date 4/18/2025 Operation Date 4/18/2025   Attending Physician David Zamora MD       Patient Name: Anette Perez     Preoperative Diagnosis: Menorrhagia with irregular cycle [N92.1]     Postoperative Diagnosis: Menorrhagia with irregular cycle [N92.1]     Procedure(s):  Hysteroscopy with Novasure endometrial and endometrial sampling     Primary Surgeon: David Zamora MD           Anesthesia: General     Specimen:   ID Type Source Tests Collected by Time Destination   1 : 1. endometrial curettings Tissue Endometrial curettage SURGICAL PATHOLOGY TISSUE David Zamora MD 4/18/2025 11:48 AM         Estimated Blood Loss: No data recorded     Complications: none      Indications for procedure:      Surgical Findings:     The patient was taken to the operating room after consent was signed.  She was given general anesthesia and a timeout was performed. She was placed in the dorsolithotomy position and prepped and draped in the normal sterile fashion.  A Lechuga catheter was inserted under sterile conditions.      A right angle retractor was placed anteriorly and posteriorly and the anterior lip of the cervix was grasped with a single toothed tenaculum.  Uterus was sounded to 8 cm and the cervix was dilated to a #8 Hegar . The 6 mm true clear mini  hysteroscope , after normal saline fluid was run through the tubing, was placed through the endocervical canal and into the uterus and under direct  visualization the uterine cavity was visualized.  The uterine cavity was inspected and both ostia visualized .  Normal uterine cavity with no polyps or fibroids visualized. Proliferative endometrium seen. The TruClear soft  tissue shaver mini hand piece was placed through the hysteroscope and the tip was placed over the surface of multiple proliferative  endometrium areas and the  device was activated by pressing on the final until all the tissue was aspirated into the device and into the canister.   The hysteroscope was removed and  the Novasure device was then inserted to the fundus for the endometrial ablation.  The uterine cavity length was 6.0 cm and width was 4.7 cm.  After successfully passing the uterine cavity assessment, the device was enabled and the peddle pressed and the ablation was performed lasting 1 minute 16 secs.  The novasure device was removed and the TreClear hysteroscope reintroduced. A  complete ablation was noted.  I then removed the hysteroscope.  Normal saline fluid deficit was 50 ml.     The tenaculum was remove from the cervix and the site became hemostatic.  Sponge lap and needle count correct x2.  The patient was taken out of dorsal lithotomy position, and taken to PACU in stable condition.        Complexity:  (optional)    Operative Summary:       Implants: * No implants in log *     Drains:      Condition: stable       David Zamora MD

## 2025-04-18 NOTE — ANESTHESIA PREPROCEDURE EVALUATION
Anesthesia PreOp Note    HPI:     Anette Perez is a 34 year old female who presents for preoperative consultation requested by: David Zamora MD    Date of Surgery: 1/8/2025    Procedure(s):  Xi robot-assisted laparoscopic right/left ovarian cystectomy, right/left salpingo-oophorectomy with possible ablation of endometriosis  Indication: Endometrioma [N80.129]  Pelvic pain [R10.2]    Relevant Problems   No relevant active problems       NPO:  Last Liquid Consumption Date: 04/17/25  Last Liquid Consumption Time: 2000  Last Solid Consumption Date: 04/17/25  Last Solid Consumption Time: 2000  Last Liquid Consumption Date: 04/17/25          History Review:  Patient Active Problem List    Diagnosis Date Noted    Endometrioma of ovary 12/12/2024    Pelvic pain 12/12/2024    Encounter for management and injection of depo-Provera 12/06/2023    Immunization due 12/06/2023    Obesity (BMI 30.0-34.9) 12/06/2023    Hypercholesteremia 12/06/2023    Neck pain 11/28/2023    Tingling of both upper extremities 11/28/2023    Cervical radiculopathy 02/08/2023    Yeast vaginitis 11/10/2020    Breast pain 07/10/2020    Lump or mass in breast 07/10/2020    Subserous leiomyoma of uterus 07/09/2020    Keratosis pilaris 06/13/2019    Multiple skin nodules 06/13/2019    It band syndrome, right 06/13/2019    Psoriasis 05/25/2018    Depression 06/14/2016    Plantar fascial fibromatosis 05/18/2014    Pain in joint, pelvic region and thigh 01/03/2014    Mild dysplasia of cervix (KELVIN I) 12/17/2013    Dysuria 06/18/2013    Dyspnea and respiratory abnormality 04/20/2012    Pain in thoracic spine 03/02/2012    Mild intermittent asthma with acute exacerbation (HCC) 01/03/2012    Conjunctivitis 10/21/2010    Absence of menstruation 03/31/2010    Vaginitis and vulvovaginitis 03/31/2010    Multiple sites, insect bite, nonvenomous 12/28/2009       Past Medical History:    Asthma (HCC)    Decorative tattoo    Esophageal reflux     Hyperlipidemia     (normal spontaneous vaginal delivery) (HCC)     (normal spontaneous vaginal delivery) (HCC)     (normal spontaneous vaginal delivery) (HCC)    Psoriasis    Visual impairment    glasses       Past Surgical History:   Procedure Laterality Date    Hemorrhoidectomy  2019    L anterior midline    Removal of ovarian cyst(s)      Tubal ligation         Prescriptions Prior to Admission[1]  Current Medications and Prescriptions Ordered in Epic[2]    Allergies[3]    Family History   Problem Relation Age of Onset    Cancer Paternal Grandmother         stomach    Gastro-Intestinal Disorder Maternal Grandmother         bleeding ulcer (cause of death)    Asthma Maternal Grandmother     Genetic Disease Maternal Uncle         muscular dystrophy     Social History     Socioeconomic History    Marital status:     Number of children: 3   Occupational History    Occupation: [employer]     Employer: [employer]    Tobacco Use    Smoking status: Former     Current packs/day: 0.00     Types: Cigarettes     Quit date: 2016     Years since quittin.9     Passive exposure: Never    Smokeless tobacco: Never   Vaping Use    Vaping status: Never Used   Substance and Sexual Activity    Alcohol use: No     Alcohol/week: 0.0 standard drinks of alcohol    Drug use: No    Sexual activity: Yes     Birth control/protection: Condom   Other Topics Concern    Caffeine Concern Yes     Comment: coffee,soda    Grew up on a farm No    History of tanning Yes    Outdoor occupation No    Pt has a pacemaker No    Pt has a defibrillator No    Breast feeding No    Reaction to local anesthetic No       Available pre-op labs reviewed.  Lab Results   Component Value Date    WBC 9.0 2025    RBC 4.48 2025    HGB 12.7 2025    HCT 39.3 2025    MCV 87.7 2025    MCH 28.3 2025    MCHC 32.3 2025    RDW 12.4 2025    .0 2025    URINEPREG Negative 2025                Vital Signs:  Body mass index is 33.83 kg/m².   height is 1.6 m (5' 3\") and weight is 86.6 kg (191 lb). Her oral temperature is 98.6 °F (37 °C). Her blood pressure is 114/65 and her pulse is 68. Her respiration is 14 and oxygen saturation is 97%.   Vitals:    04/15/25 1705 04/18/25 0952   BP:  114/65   Pulse:  68   Resp:  14   Temp:  98.6 °F (37 °C)   TempSrc:  Oral   SpO2:  97%   Weight: 81.6 kg (180 lb) 86.6 kg (191 lb)   Height: 1.6 m (5' 3\")         Anesthesia Evaluation     Patient summary reviewed and Nursing notes reviewed    Airway   Mallampati: II  TM distance: >3 FB  Neck ROM: full  Dental - Dentition appears grossly intact     Pulmonary - normal exam   (+) asthma, shortness of breath  (-) sleep apnea  Cardiovascular - normal exam  Exercise tolerance: good  (-) hypertension, dysrhythmias    NYHA Classification: I    Neuro/Psych    (+)  neuromuscular disease,  depression      GI/Hepatic/Renal    (+) GERD    Endo/Other    (-) diabetes mellitus, hypothyroidism, arthritis  Abdominal  - normal exam                 Anesthesia Plan:   ASA:  2  Plan:   General  Airway:  LMA  Post-op Pain Management: IV analgesics and Oral pain medication  Informed Consent Plan and Risks Discussed With:  Patient and spouse  Discussed plan with:  CRNA, attending and surgeon  Provider Attestation (if preop done by other):  GA/LMA poss ETT/PONV/dental damages etc      I have informed Anette Perez and/or legal guardian or family member of the nature of the anesthetic plan, benefits, risks including possible dental damage if relevant, major complications, and any alternative forms of anesthetic management.   All of the patient's questions were answered to the best of my ability. The patient desires the anesthetic management as planned.    1/8/2025 7:14 AM  Present on Admission:  **None**           [1]   Medications Prior to Admission   Medication Sig Dispense Refill Last Dose/Taking    hydrOXYzine 10 MG Oral Tab  Take 1 tablet (10 mg total) by mouth at bedtime.   Past Month    betamethasone 0.1 % External Cream Apply 1 Application topically daily. 15 g 0 Past Month    clobetasol 0.05 % External Solution Use bid  As directed to scalp 50 mL 6 Past Month    Minoxidil 5 % External Solution Apply to areas of hair loss qd 60 mL 1 Past Month    tacrolimus (PROTOPIC) 0.1 % External Ointment Use at bedtime to areas of psoriasis on face 30 g 1 Past Month    Adalimumab (HUMIRA, 2 PEN,) 40 MG/0.4ML Subcutaneous Pen-injector Kit Inject 40 mg into the skin every 14 (fourteen) days. Maintenance dose 2 each 5 4/11/2025    Fluocinolone Acetonide Scalp (DERMA-SMOOTHE/FS SCALP) 0.01 % External Oil Use qhs as directed for scalp psoriasis 118 mL 12 Past Month    Misc. Devices Does not apply Misc Hair prosthesis for alopecia, >60% involvement , medically necessary 1 each 0     omeprazole 20 MG Oral Capsule Delayed Release Take 1 capsule (20 mg total) by mouth every morning before breakfast. (Patient taking differently: Take 1 capsule (20 mg total) by mouth daily as needed.) 90 capsule 3 More than a month   [2]   Current Facility-Administered Medications Ordered in Epic   Medication Dose Route Frequency Provider Last Rate Last Admin    lactated ringers infusion   Intravenous Continuous David Zamora MD 20 mL/hr at 04/18/25 0956 New Bag at 04/18/25 0956     No current [facility]-ordered outpatient medications on file.   [3] No Known Allergies

## 2025-04-18 NOTE — ANESTHESIA POSTPROCEDURE EVALUATION
Patient: Anette Perez    Procedure Summary       Date: 04/18/25 Room / Location: [facility] MAIN OR  / [facility] MAIN OR    Anesthesia Start: 1109 Anesthesia Stop: 1200    Procedure: Hysteroscopy with Novasure endometrial and endometrial sampling (Vagina ) Diagnosis:       Menorrhagia with irregular cycle      (Menorrhagia with irregular cycle [N92.1])    Surgeons: David Zamora MD Anesthesiologist: Cely Saeed MD    Anesthesia Type: general ASA Status: 2            Anesthesia Type: general    Vitals Value Taken Time   /60 04/18/25 11:59   Temp 97.4 °F (36.3 °C) 04/18/25 11:59   Pulse 68 04/18/25 12:00   Resp 15 04/18/25 12:00   SpO2 100 % 04/18/25 12:00   Vitals shown include unfiled device data.    [facility] AN Post Evaluation:   Patient Evaluated in PACU  Patient Participation: waiting for patient participation  Level of Consciousness: sleepy but conscious  Pain Score: 0  Pain Management: adequate  Airway Patency:patent  Yes    Nausea/Vomiting: none  Cardiovascular Status: acceptable  Respiratory Status: acceptable and face mask  Postoperative Hydration acceptable      Norma Scott CRNA  4/18/2025 12:00 PM

## 2025-04-18 NOTE — H&P
Anette Perez is a 34 year old female  Patient's last menstrual period was 03/15/2025 (exact date). No chief complaint on file.    Pt with history heavy menses, and depoprovera did not alleviate  OBSTETRICS HISTORY:     OB History    Para Term  AB Living   4 3 3  1 3   SAB IAB Ectopic Multiple Live Births   1    3      # Outcome Date GA Lbr Porter/2nd Weight Sex Type Anes PTL Lv   4 Term 20 39w3d 17:05 / 00:59 7 lb 13.8 oz (3.565 kg) F Vag-Vacuum EPI N STEVEN      Complications: Group B beta strep +, Variable decelerations, Fetal intolerance to labor   3 SAB 17      None N       Birth Comments: Had a D&C    2 Term 13 39w0d  7 lb 4 oz (3.289 kg) F Vag-Spont EPI N STEVEN   1 Term 11 39w0d  6 lb 4 oz (2.835 kg) F Vag-Spont EPI N STEVEN       GYNE HISTORY:         Menarche: 12 (2025 10:23 AM)  Period Cycle (Days): 28 (2025 10:23 AM)  Period Duration (Days): 5 (2025 10:23 AM)  Period Flow: moderate (2025 10:23 AM)  Use of Birth Control (if yes, specify type): Tubal Ligation (2025 10:23 AM)  Hx Prior Abnormal Pap: No (2025 10:23 AM)  Pap Date: 23 (2025 10:23 AM)  Pap Result Notes: wnl (2025 10:23 AM)        Latest Ref Rng & Units 2023     3:20 PM 2020     3:44 PM   RECENT PAP RESULTS   INTERPRETATION/RESULT: Negative for intraepithelial lesion or malignancy Negative for intraepithelial lesion or malignancy  Negative for intraepithelial lesion or malignancy    HPV Negative Negative  Negative          MEDICAL HISTORY:     Past Medical History[1]    SURGICAL HISTORY:     Past Surgical History[2]    SOCIAL HISTORY:     Short Social Hx on File[3]     FAMILY HISTORY:     Family History[4]    MEDICATIONS:     Medications - Current[5]    ALLERGIES:     Allergies[6]      REVIEW OF SYSTEMS:     Constitutional:    denies fever / chills  Eyes:     denies blurred or double vision  Cardiovascular:  denies chest pain or palpitations  Respiratory:     denies shortness of breath  Gastrointestinal:  denies severe abdominal pain, frequent diarrhea or constipation, nausea / vomiting  Genitourinary:    denies dysuria, bothersome incontinence  Skin/Breast:   denies any breast pain, lumps, or discharge  Neurological:    denies frequent severe headaches  Psychiatric:   denies depression or anxiety, thoughts of harming self or others  Heme/Lymph:    denies easy bruising or bleeding      PHYSICAL EXAM:   Blood pressure 114/65, pulse 68, temperature 98.6 °F (37 °C), temperature source Oral, resp. rate 14, height 5' 3\" (1.6 m), weight 191 lb (86.6 kg), last menstrual period 03/15/2025, SpO2 97%, not currently breastfeeding.  Constitutional:  well developed, well nourished  Head/Face:  normocephalic  Neck/Thyroid: thyroid symmetric, no thyromegaly, no nodules, no adenopathy  Lymphatic: no abnormal supraclavicular or axillary adenopathy is noted  Respiratory:      chest wall symmetric and nontender on palpation, clear to asculation bilateral, no wheezing, rales, ronchi, and resonance normal upon percussion  Cardiovascular: chest normal in appearance, regular rate and rhythm, no murmurs, PMI palpated midclavicular line    Abdomen:   soft, nontender, nondistended, no masses  Skin/Hair:  no unusual rashes or bruises  Extremities:  no edema, no cyanosis, non tender bilaterally  Psychiatric:   oriented to time, place, person and situation. Appropriate mood and affect          ASSESSMENT & PLAN:   menorrhagia- plan novasure. Risks d/w pt   FOLLOW-UP     No follow-ups on file.      David Zamora MD  2025       [1]   Past Medical History:   Asthma (HCC)    Decorative tattoo    Esophageal reflux    Hyperlipidemia     (normal spontaneous vaginal delivery) (HCC)     (normal spontaneous vaginal delivery) (HCC)     (normal spontaneous vaginal delivery) (HCC)    Psoriasis    Visual impairment    glasses   [2]   Past Surgical History:  Procedure Laterality Date     Hemorrhoidectomy  2019    L anterior midline    Removal of ovarian cyst(s)      Tubal ligation     [3]   Social History  Socioeconomic History    Marital status:     Number of children: 3   Occupational History    Occupation: [employer]     Employer: [employer]    Tobacco Use    Smoking status: Former     Current packs/day: 0.00     Types: Cigarettes     Quit date: 2016     Years since quittin.9     Passive exposure: Never    Smokeless tobacco: Never   Vaping Use    Vaping status: Never Used   Substance and Sexual Activity    Alcohol use: No     Alcohol/week: 0.0 standard drinks of alcohol    Drug use: No    Sexual activity: Yes     Birth control/protection: Condom   Other Topics Concern    Caffeine Concern Yes     Comment: coffee,soda    Grew up on a farm No    History of tanning Yes    Outdoor occupation No    Pt has a pacemaker No    Pt has a defibrillator No    Breast feeding No    Reaction to local anesthetic No     Social Drivers of Health     Food Insecurity: Not on File (2024)    Received from [facility]    Food Insecurity     Food: 0   Transportation Needs: Not on File (2023)    Received from [facility]    Transportation Needs     Transportation: 0   Stress: Not on File (2023)    Received from [facility]    Stress     Stress: 0   Housing Stability: Not on File (2023)    Received from [facility]    Housing Stability     Housin   [4]   Family History  Problem Relation Age of Onset    Cancer Paternal Grandmother         stomach    Gastro-Intestinal Disorder Maternal Grandmother         bleeding ulcer (cause of death)    Asthma Maternal Grandmother     Genetic Disease Maternal Uncle         muscular dystrophy   [5] No current outpatient medications on file.  [6] No Known Allergies

## 2025-04-20 NOTE — PROGRESS NOTES
Anette Perez is a 34 year old female.    Chief Complaint   Patient presents with    Alopecia     LOV 25. Pt presents for Alopecia F/U. Pt is still experiencing hair loss. New areas of hair loss is top of scalp.   Using clobetasol 0.05 % External Sol, Minoxidil 5 % External Solution and Kenalog 10 (20) mg injected.    Pt is also on Humira q 2 wks for Psoriasis.             Patient has no known allergies.  Current Outpatient Medications   Medication Sig Dispense Refill    Misc. Devices Does not apply Misc Hair prosthesis for alopecia, >60% involvement , medically necessary 1 each 0    hydrOXYzine 10 MG Oral Tab Take 1 tablet (10 mg total) by mouth at bedtime.      betamethasone 0.1 % External Cream Apply 1 Application topically daily. 15 g 0    clobetasol 0.05 % External Solution Use bid  As directed to scalp 50 mL 6    Minoxidil 5 % External Solution Apply to areas of hair loss qd 60 mL 1    tacrolimus (PROTOPIC) 0.1 % External Ointment Use at bedtime to areas of psoriasis on face 30 g 1    Adalimumab (HUMIRA, 2 PEN,) 40 MG/0.4ML Subcutaneous Pen-injector Kit Inject 40 mg into the skin every 14 (fourteen) days. Maintenance dose 2 each 5    Fluocinolone Acetonide Scalp (DERMA-SMOOTHE/FS SCALP) 0.01 % External Oil Use qhs as directed for scalp psoriasis 118 mL 12    ibuprofen 600 MG Oral Tab Take 1 tablet (600 mg total) by mouth every 6 (six) hours as needed. 12 tablet 0      Past Medical History:    Asthma (HCC)    Decorative tattoo    Esophageal reflux    Hyperlipidemia     (normal spontaneous vaginal delivery) (HCC)     (normal spontaneous vaginal delivery) (HCC)     (normal spontaneous vaginal delivery) (HCC)    Psoriasis    Visual impairment    glasses      Social History:  Social History     Socioeconomic History    Marital status:     Number of children: 3   Occupational History    Occupation: [employer]     Employer: [employer]    Tobacco Use    Smoking status: Former      Current packs/day: 0.00     Types: Cigarettes     Quit date: 2016     Years since quittin.9     Passive exposure: Never    Smokeless tobacco: Never   Vaping Use    Vaping status: Never Used   Substance and Sexual Activity    Alcohol use: No     Alcohol/week: 0.0 standard drinks of alcohol    Drug use: No    Sexual activity: Yes     Birth control/protection: Condom   Other Topics Concern    Caffeine Concern Yes     Comment: coffee,soda    Grew up on a farm No    History of tanning Yes    Outdoor occupation No    Pt has a pacemaker No    Pt has a defibrillator No    Breast feeding No    Reaction to local anesthetic No     Social Drivers of Health     Food Insecurity: Not on File (2024)    Received from [facility]    Food Insecurity     Food: 0   Transportation Needs: Not on File (2023)    Received from [facility]    Transportation Needs     Transportation: 0   Stress: Not on File (2023)    Received from [facility]    Stress     Stress: 0   Housing Stability: Not on File (2023)    Received from [employer]    Housing Stability     Housin                 Current Outpatient Medications   Medication Sig Dispense Refill    Misc. Devices Does not apply Misc Hair prosthesis for alopecia, >60% involvement , medically necessary 1 each 0    hydrOXYzine 10 MG Oral Tab Take 1 tablet (10 mg total) by mouth at bedtime.      betamethasone 0.1 % External Cream Apply 1 Application topically daily. 15 g 0    clobetasol 0.05 % External Solution Use bid  As directed to scalp 50 mL 6    Minoxidil 5 % External Solution Apply to areas of hair loss qd 60 mL 1    tacrolimus (PROTOPIC) 0.1 % External Ointment Use at bedtime to areas of psoriasis on face 30 g 1    Adalimumab (HUMIRA, 2 PEN,) 40 MG/0.4ML Subcutaneous Pen-injector Kit Inject 40 mg into the skin every 14 (fourteen) days. Maintenance dose 2 each 5    Fluocinolone Acetonide Scalp (DERMA-SMOOTHE/FS SCALP) 0.01 % External Oil Use qhs as directed for scalp psoriasis 118  mL 12    ibuprofen 600 MG Oral Tab Take 1 tablet (600 mg total) by mouth every 6 (six) hours as needed. 12 tablet 0     Allergies:   No Known Allergies    Past Medical History:    Asthma (HCC)    Decorative tattoo    Esophageal reflux    Hyperlipidemia     (normal spontaneous vaginal delivery) (HCC)     (normal spontaneous vaginal delivery) (HCC)     (normal spontaneous vaginal delivery) (HCC)    Psoriasis    Visual impairment    glasses     Past Surgical History:   Procedure Laterality Date    Hemorrhoidectomy  2019    L anterior midline    Removal of ovarian cyst(s)      Tubal ligation       Social History     Socioeconomic History    Marital status:      Spouse name: Not on file    Number of children: 3    Years of education: Not on file    Highest education level: Not on file   Occupational History    Occupation: [employer]     Employer: [employer]    Tobacco Use    Smoking status: Former     Current packs/day: 0.00     Types: Cigarettes     Quit date: 2016     Years since quittin.9     Passive exposure: Never    Smokeless tobacco: Never   Vaping Use    Vaping status: Never Used   Substance and Sexual Activity    Alcohol use: No     Alcohol/week: 0.0 standard drinks of alcohol    Drug use: No    Sexual activity: Yes     Birth control/protection: Condom   Other Topics Concern     Service Not Asked    Blood Transfusions Not Asked    Caffeine Concern Yes     Comment: coffee,soda    Occupational Exposure Not Asked    Hobby Hazards Not Asked    Sleep Concern Not Asked    Stress Concern Not Asked    Weight Concern Not Asked    Special Diet Not Asked    Back Care Not Asked    Exercise Not Asked    Bike Helmet Not Asked    Seat Belt Not Asked    Self-Exams Not Asked    Grew up on a farm No    History of tanning Yes    Outdoor occupation No    Pt has a pacemaker No    Pt has a defibrillator No    Breast feeding No    Reaction to local anesthetic No   Social History  Narrative    Not on file     Social Drivers of Health     Food Insecurity: Not on File (2024)    Received from [facility]    Food Insecurity     Food: 0   Transportation Needs: Not on File (2023)    Received from [facility]    Transportation Needs     Transportation: 0   Stress: Not on File (2023)    Received from [facility]    Stress     Stress: 0   Housing Stability: Not on File (2023)    Received from [facility]    Housing Stability     Housin     Family History   Problem Relation Age of Onset    Cancer Paternal Grandmother         stomach    Gastro-Intestinal Disorder Maternal Grandmother         bleeding ulcer (cause of death)    Asthma Maternal Grandmother     Genetic Disease Maternal Uncle         muscular dystrophy                      HPI :      Chief Complaint   Patient presents with    Alopecia     LOV 25. Pt presents for Alopecia F/U. Pt is still experiencing hair loss. New areas of hair loss is top of scalp.   Using clobetasol 0.05 % External Sol, Minoxidil 5 % External Solution and Kenalog 10 (20) mg injected.    Pt is also on Humira q 2 wks for Psoriasis.     Follow-up psoriasis.  Has noted in general improvement on Humira currently.  Scalp generally has improved.  Patient's noted increasing hair loss patchy for tiredly over the parietal area.  Patient under more stress recently family numbers visiting.  Notes some itching.  More scaling on the face.  Hair loss came up suddenly has noted some tenderness in these areas.  Nothing else new or different.  Had strep recently, several courses of antibiotics azithromycin and cefdinir recent labs show elevated white count, no anemia eosinophils 0.1% symptoms have improved with antibiotics.  Denies any reaction.  Patient here with daughter and     History of Present Illness    25  Anette Perez is a 34 year old female with alopecia areata and psoriasis who presents with worsening hair loss.    She is experiencing new and  worsening hair loss, describing it as 'like it's falling' and noting visible spots on her scalp. She is concerned about the progression and mentions trying to cover the areas but finding it increasingly difficult. She has purchased a wig due to concerns about potentially losing all her hair. She is currently taking minoxidil tablets, two tablets, which makes her feel more sleepy the following day. Additionally, she uses topical solutions, one of which smells like alcohol, and another that is applied as drops.    She has a history of alopecia areata and psoriasis, for which she is currently taking Humira every two weeks. The medication helps significantly with her arthritis symptoms. She experiences occasional itching, particularly on her arms, which she attributes to psoriasis. She is also taking hydroxyzine for itching, which she refers to as 'the little ones' and uses them at night.    She mentions a rash that developed after a surgery in January, which she initially thought was related to the procedure. However, a doctor indicated it was not related, and she was prescribed a cream to alleviate itching, which has been somewhat effective. She describes the rash as a 'nail rash' and notes it is improving slowly.    Her blood pressure is stable and she is recovering well from her surgery, except for the ongoing hair loss, which she finds distressing.    3/21/25  Anette Perez is a 34 year old female who presents with hair regrowth concerns.    She is experiencing hair regrowth concerns, particularly noting improvement on the sides of her scalp. She describes bald spots that are noticeable when the wind hits her scalp but feels the condition is stable and improving overall. She is actively monitoring areas where new hair has not yet appeared.    Her current treatment regimen includes a liquid solution containing minoxidil, applied twice daily, which helps with itching and inflammation. She has no issues with  the minoxidil and has refills available. Additionally, she is taking an oral medication, one tablet daily, which she has been using for several months to encourage hair regrowth.    Stress has been a factor in her condition, and she acknowledges the need to manage this aspect. She is pleased with the progress, noting significant hair regrowth and overall improvement in her condition.    4/11/25  Anette Perez is a 34 year old female with alopecia areata who presents for follow-up on hair regrowth and psoriasis management.    She is experiencing significant improvement in hair regrowth, with nearly all follicles showing hair. The hair is short and stubbly but remains in place. The front of her scalp is completely filled in, although the hair is fine and lighter in color. A few patches, particularly in the crown area, remain sparse.    She is managing her psoriasis with Humira. She notes that keeping her psoriasis under control is important as it may contribute to itching and potentially affect her hair regrowth. She missed a dose of Humira one week, which led to itching, but she resumed the medication the following Friday and continued with her regular schedule. She is advised to maintain a two-week interval between doses.    Socially, she mentions her daughters and plans for the summer, indicating a supportive family environment. She is not planning any significant activities until the summer, when she hopes to visit [place].      Patient presents with concerns above.    Past notes/ records and appropriate/relevant lab results including pathology and past body maps reviewed. Updated and new information noted in current visit.       ROS:    Denies any other systemic complaints.  No fevers, chills, night sweats, sensitivity to the sun, deeper lumps or bumps.  No other skin complaints.  History, medications, allergies as noted.    Physical examination: Patient  well-developed well-nourished, alert  oriented in no acute distress.  Exam of involved, appropriate areas of skin performed, including scalp, head, neck, face,nails, hair, external eyes, including conjunctival mucosa, eyelids, lips, external ears, back, chest, abdomen, arms, legs, palms.  Remarkable for lesions as noted   See map for details  Patchy areas of hair loss bilateral parietal scalp right greater than left.  Erythema and scaling over the glabella nasolabial folds alar creases     ASSESSMENT AND PLAN:     Encounter Diagnoses   Name Primary?    Alopecia areata Yes    Psoriasis vulgaris     Medication monitoring encounter     Hair loss          Physical Exam  SKIN: Hair regrowth present.                Results      Assessment & Plan      2/28/25  Alopecia Areata  Worsening alopecia areata with new and expanding areas of hair loss, likely exacerbated by recent surgery. Currently on Humira for psoriasis and psoriatic arthritis, limiting other medication options. Discussed oral minoxidil for hair growth, with potential side effects including facial hair growth. Newer medications like Rinvoq or Cibinqo might help both conditions but lack sufficient data. Stopping Humira is not advisable due to its necessity for psoriasis and arthritis control.  - Continue oral minoxidil at 2.5 mg daily, start with half a tablet and increase to a full tablet after 1-2 weeks if tolerated  - Continue topical minoxidil solutions  - Administer intralesional corticosteroid injections every 3-4 weeks  - Consider adding hydroxychloroquine if no improvement    Psoriasis  Well-controlled with Humira. Reports improvement in scalp psoriasis with no significant flares.  - Continue Humira as prescribed    Psoriatic Arthritis  Managed with Humira, effectively controlling symptoms.  - Continue Humira as prescribed    General Health Maintenance  Overall good health post-surgery.  - Monitor blood pressure regularly    Follow-up  - Follow up in 3-4 weeks for intralesional  corticosteroid injections  - Monitor response to oral minoxidil and adjust dosage as needed.    3/21/25  Alopecia Areata  She is experiencing hair regrowth, particularly on the sides, indicating improvement. The condition is well-managed with minoxidil, which she tolerates without issues. Increasing the dosage is not advised due to the risk of unwanted facial hair growth. The liquid minoxidil solution, containing cortisone, is recommended twice daily to address inflammation. Continued use is expected to promote further hair regrowth over the next four to five months.  - Continue minoxidil 5% topical solution twice daily  - Monitor hair regrowth and condition management  - Reassess in follow-up appointment  Overall significant regrowth still some patchy areas continue monitoring      4/11/25  Alopecia areata  Alopecia areata with significant regrowth; nearly 100% of follicles have hair, though it is short and fine. Some areas, particularly at the crown, still have patches. The condition is improving with current treatment, but regrowth is slow and may take a long time to fully resolve. Discussed the option of using a hair prosthesis to manage cosmetic concerns while waiting for full regrowth. Hair prosthesis is considered a prosthetic device and may be covered by insurance under durable medical equipment. It is important to avoid shaving the head or using hair extensions or glue, as these can interfere with regrowth.  - Continue Minoxidil 2.5 MG Oral daily  - Consider hair prosthesis; write prescription and advise checking with insurance for coverage  - Advise against shaving head or using hair extensions or glue  Frustrated with slow growth and widespread patchy areas of loss although regrowth is noted.  Patient tearful has been negatively impacting her self-esteem, family functions work.    Psoriasis vulgaris  Psoriasis is well-controlled with Adalimumab (Humira). She experienced itching after missing a dose, which  may have exacerbated the condition. Maintaining regular dosing is crucial to prevent flare-ups. Controlling psoriasis may aid in the regrowth of hair affected by alopecia areata.  - Continue Adalimumab (Humira) 40 MG/0.4ML Subcutaneous every 14 days  - Advise maintaining regular dosing schedule  Will need quant gold over the summer ordered    Psoriatic Arthritis  Psoriatic arthritis is managed with Adalimumab (Humira). She reports improvement in symptoms, but adherence to the medication schedule is crucial to prevent exacerbation.  - Continue Adalimumab (Humira) 40 MG/0.4ML Subcutaneous every 14 days  - Advise maintaining regular dosing schedule          Assessment / plan:  Acneform lesions have improved.  Continue monitoring.    History of psoriasis.  Improved on Humira currently still active areas on the face scalp okay   Psoriasis.  Plaque-type.  Currently less than 5% BSA involvement.  Prior to initiating Humira 30-% body surface involvement.  Will treat with medications and grid.  Overall skincare, liberal use of emollients discussed.  Consider more aggressive therapy if worsening.Patient will let us know how they are doing over the next several weeks.  Await clinical response to above therapy.  Recheck in 2-3 months if no improvement.    Widespread lesions especially over the scalp, previously currently clear    Monitor for guttate flare with recent strep.  Continue Humira  Medically necessary given widespread psoriasis worsening, scalp involvement, symptoms interfering with daily function, work, severe itching.    For psoriasis on face.  Will add tacrolimus.  Clobetasol scalp    Will need follow-up in 4 to 6 months, continue quant gold due at least annually      Alopecia areata worsening and her above.  Intralesional Kenalog 10 mg per mL total of 2.0 mL injected to above sites.  At bilateral parietal scalp autoimmune nature possibly occurring with other auto inflammatory conditions including thyroid problems  as well as association with atopic conditions, potentially relapsing, recurrent persistent condition discussed.  Pathophysiology reviewed.  See grid for topicals.  May consider minoxidil.  No labs presently consider thyroid function, RACHELE, CBC if persists.  Topicals including newer medications, various forms of topical steroids, systemic medications reviewed.  Followup one month or p.r.n.  Clobetasol twice daily to patchy areas on the scalp, minoxidil solution twice daily  Await response recheck in 6 weeks if needed stress likely factor other labs reviewed consider additional labs if worsening    Pathophysiology discussed with patient.  Therapeutic options reviewed.  See  Medications in grid.  Instructions reviewed at length.     General skin care questions answered.   Reassurance regarding benign skin lesions.Signs and symptoms of skin cancer, ABCDE's of melanoma briefly reviewed.  Sunscreen use (broad-spectrum, ideally mineral, UVA UVB coverage SPF 30 or greater recommended), sun protection, encouraged.  Followup as noted in rtc or p.r.n.  Encounter Times   Including precharting, reviewing chart, prior notes obtaining history, medical exam, notes on body map, plan, counseling, discussion of therapeutic options, patient education, orders more than half total time spent in face to face time with patient.  My total time spent caring for the patient on the day of the encounter: 35 minutes       The patient indicates understanding of these issues and agrees to the plan.  The patient is asked to return as noted in follow-up /as noted above    This note was generated using Dragon voice recognition software.  Please contact me regarding any confusion resulting from errors in recognition.  Note to patient and family: The 21st Century Cures Act makes medical notes like these available to patients. However, be advised this is a medical document. It is intended as [id number] communication and monitoring of a patient's care  needs. It is written in medical language and may contain abbreviations or verbiage that are unfamiliar. It may appear blunt or direct. Medical documents are intended to carry relevant information, facts as evident and the clinical opinion of the practitioner.  No orders of the defined types were placed in this encounter.      Meds & Refills for this Visit:   Requested Prescriptions     Signed Prescriptions Disp Refills    Misc. Devices Does not apply Misc 1 each 0     Sig: Hair prosthesis for alopecia, >60% involvement , medically necessary       Encounter Diagnoses   Name Primary?    Psoriasis vulgaris Yes    Medication monitoring encounter        No orders of the defined types were placed in this encounter.      Results From Past 48 Hours:  No results found for this or any previous visit (from the past 48 hours).    Meds This Visit:      Imaging Orders:  None     Referral Orders:  No orders of the defined types were placed in this encounter.

## 2025-04-29 ENCOUNTER — HOSPITAL ENCOUNTER (OUTPATIENT)
Age: 35
Discharge: HOME | End: 2025-04-29
Payer: MEDICAID

## 2025-04-29 VITALS
OXYGEN SATURATION: 99 % | BODY MASS INDEX: 31.89 KG/M2 | WEIGHT: 180 LBS | SYSTOLIC BLOOD PRESSURE: 136 MMHG | DIASTOLIC BLOOD PRESSURE: 79 MMHG | HEIGHT: 63 IN | HEART RATE: 84 BPM | TEMPERATURE: 99 F | RESPIRATION RATE: 18 BRPM

## 2025-04-29 DIAGNOSIS — B34.9 VIRAL SYNDROME: Primary | ICD-10-CM

## 2025-04-29 LAB
POCT INFLUENZA A: NEGATIVE
POCT INFLUENZA B: NEGATIVE
S PYO AG THROAT QL IA.RAPID: NEGATIVE
SARS-COV-2 RNA RESP QL NAA+PROBE: NOT DETECTED

## 2025-04-29 PROCEDURE — 99213 OFFICE O/P EST LOW 20 MIN: CPT

## 2025-04-29 PROCEDURE — 87651 STREP A DNA AMP PROBE: CPT | Performed by: NURSE PRACTITIONER

## 2025-04-29 PROCEDURE — 87502 INFLUENZA DNA AMP PROBE: CPT | Performed by: NURSE PRACTITIONER

## 2025-04-29 RX ORDER — DEXTROMETHORPHAN HBR, GUAIFENESIN 60; 1200 MG/1; MG/1
1 TABLET, EXTENDED RELEASE ORAL EVERY 12 HOURS
Qty: 14 TABLET | Refills: 0 | Status: SHIPPED | OUTPATIENT
Start: 2025-04-29 | End: 2025-05-06

## 2025-04-29 RX ORDER — DEXAMETHASONE 6 MG/1
6 TABLET ORAL ONCE
Qty: 1 TABLET | Refills: 0 | Status: SHIPPED | OUTPATIENT
Start: 2025-04-29 | End: 2025-04-29

## 2025-04-29 RX ORDER — AZELASTINE 1 MG/ML
1 SPRAY, METERED NASAL 2 TIMES DAILY
Qty: 1 EACH | Refills: 0 | Status: SHIPPED | OUTPATIENT
Start: 2025-04-29 | End: 2025-05-29

## 2025-04-29 NOTE — ED INITIAL ASSESSMENT (HPI)
Presents for sore throat, body aches, hoarse voice, for last 2-3 days. Mild cough, but congested.

## 2025-04-29 NOTE — ED PROVIDER NOTES
Patient Seen in: [facility]      History     Chief Complaint   Patient presents with    Sore Throat     Stated Complaint: Sore Throat    Subjective:   HPI  34-year-old female presents complaining of 2 days of bodyaches, laryngitis, congestion, and a mild cough.  She denies any fever.  She drives schoolbus.    Objective:     Past Medical History:    Asthma (HCC)    Decorative tattoo    Esophageal reflux    Hyperlipidemia     (normal spontaneous vaginal delivery) (HCC)     (normal spontaneous vaginal delivery) (HCC)     (normal spontaneous vaginal delivery) (HCC)    Psoriasis    Visual impairment    glasses              Past Surgical History:   Procedure Laterality Date    Hemorrhoidectomy  2019    L anterior midline    Removal of ovarian cyst(s)      Tubal ligation                  Social History     Socioeconomic History    Marital status:     Number of children: 3   Occupational History    Occupation: [place]     Employer: [employer]    Tobacco Use    Smoking status: Former     Current packs/day: 0.00     Types: Cigarettes     Quit date: 2016     Years since quittin.0     Passive exposure: Never    Smokeless tobacco: Never   Vaping Use    Vaping status: Never Used   Substance and Sexual Activity    Alcohol use: No     Alcohol/week: 0.0 standard drinks of alcohol    Drug use: No    Sexual activity: Yes     Birth control/protection: Condom   Other Topics Concern    Caffeine Concern Yes     Comment: coffee,soda    Grew up on a farm No    History of tanning Yes    Outdoor occupation No    Pt has a pacemaker No    Pt has a defibrillator No    Breast feeding No    Reaction to local anesthetic No     Social Drivers of Health     Food Insecurity: Not on File (2024)    Received from [employer]    Food Insecurity     Food: 0   Transportation Needs: Not on File (2023)    Received from [employer]    Transportation Needs     Transportation: 0   Housing Stability:  Not on File (2023)    Received from [facility]     Housin              Review of Systems   All other systems reviewed and are negative.      Positive for stated complaint: Sore Throat  Other systems are as noted in HPI.  Constitutional and vital signs reviewed.      All other systems reviewed and negative except as noted above.                  Physical Exam     ED Triage Vitals [25 1456]   /79   Pulse 84   Resp 18   Temp 98.6 °F (37 °C)   Temp src Oral   SpO2 99 %   O2 Device None (Room air)       Current Vitals:   Vital Signs  BP: 136/79  Pulse: 84  Resp: 18  Temp: 98.6 °F (37 °C)  Temp src: Oral    Oxygen Therapy  SpO2: 99 %  O2 Device: None (Room air)        Physical Exam  Vitals and nursing note reviewed.   Constitutional:       General: She is not in acute distress.     Appearance: She is well-developed. She is not ill-appearing or toxic-appearing.   HENT:      Right Ear: Tympanic membrane, ear canal and external ear normal.      Left Ear: Tympanic membrane, ear canal and external ear normal.      Nose: Congestion present. No rhinorrhea.      Mouth/Throat:      Pharynx: No oropharyngeal exudate or posterior oropharyngeal erythema.   Cardiovascular:      Rate and Rhythm: Normal rate and regular rhythm.      Heart sounds: Normal heart sounds.   Pulmonary:      Effort: Pulmonary effort is normal. No respiratory distress.      Breath sounds: Normal breath sounds. No wheezing.   Skin:     General: Skin is warm and dry.   Neurological:      Mental Status: She is alert and oriented to person, place, and time.           ED Course     Labs Reviewed   POCT FLU TEST - Normal    Narrative:     This assay is a rapid molecular in vitro test utilizing nucleic acid amplification of influenza A and B viral RNA.   RAPID STREP A - Normal   RAPID SARS-COV-2 BY PCR - Normal          Results                                 MDM       Medical Decision Making  34-year-old female presents  complaining of 2 days of bodyaches, laryngitis, congestion, and a mild cough.  She denies any fever.  She drives schoolbus.    Pertinent Labs & Imaging studies reviewed. (See chart for details).  Patient coming in with viral sx.   Differential diagnosis includes but not limited to virus, covid, flu, strep, pneumonia  Labs reviewed strep, covid, and flu -.  Will treat for viral syndrome.  Will discharge on decadron, astelin, mucinex. Patient/Parent is comfortable with this plan.    Overall Pt looks good. Non-toxic, well-hydrated and in no respiratory distress. Vital signs are reassuring. Exam is reassuring. I do not believe pt requires and additional diagnostic studies or intervention. I believe pt can be discharged home to continue evaluation as an outpatient. Follow-up provider given. Discharge instructions given and reviewed. Return for any problems. All understand and agree with the plan.        Problems Addressed:  Viral syndrome: acute illness or injury        Disposition and Plan     Clinical Impression:  1. Viral syndrome         Disposition:  Discharge  4/29/2025  3:31 pm    Follow-up:  No follow-up provider specified.        Medications Prescribed:  Discharge Medication List as of 4/29/2025  3:37 PM        START taking these medications    Details   dexamethasone 6 MG Oral Tab Take 1 tablet (6 mg total) by mouth one time for 1 dose., Normal, Disp-1 tablet, R-0      azelastine 0.1 % Nasal Solution 1 spray by Nasal route 2 (two) times daily., Normal, Disp-1 each, R-0      dextromethorphan-guaifenesin ER (MUCINEX DM MAXIMUM STRENGTH)  MG Oral Tablet 12 Hr Take 1 tablet by mouth every 12 (twelve) hours for 7 days., Normal, Disp-14 tablet, R-0             Supplementary Documentation:                                                                         labor

## 2025-04-30 ENCOUNTER — OFFICE VISIT (OUTPATIENT)
Dept: OBGYN CLINIC | Facility: CLINIC | Age: 35
End: 2025-04-30
Payer: MEDICAID

## 2025-04-30 VITALS
HEART RATE: 81 BPM | WEIGHT: 191 LBS | SYSTOLIC BLOOD PRESSURE: 117 MMHG | BODY MASS INDEX: 33.84 KG/M2 | DIASTOLIC BLOOD PRESSURE: 74 MMHG | HEIGHT: 63 IN

## 2025-04-30 DIAGNOSIS — Z09 POSTOP CHECK: Primary | ICD-10-CM

## 2025-04-30 RX ORDER — DEXAMETHASONE 6 MG/1
TABLET ORAL
COMMUNITY
Start: 2025-04-29

## 2025-04-30 NOTE — PROGRESS NOTES
Anette Perez is a 34 year old female  Patient's last menstrual period was 03/15/2025 (exact date).   Chief Complaint   Patient presents with    Post-Op     Patient is here to follow up for her surgery she had on        OBSTETRICS HISTORY:     OB History    Para Term  AB Living   4 3 3  1 3   SAB IAB Ectopic Multiple Live Births   1    3      # Outcome Date GA Lbr Porter/2nd Weight Sex Type Anes PTL Lv   4 Term 20 39w3d 17:05 :59 7 lb 13.8 oz (3.565 kg) F Vag-Vacuum EPI N STEVEN      Complications: Group B beta strep +, Variable decelerations, Fetal intolerance to labor   3 SAB 17      None N       Birth Comments: Had a D&C    2 Term 13 39w0d  7 lb 4 oz (3.289 kg) F Vag-Spont EPI N STEVEN   1 Term 11 39w0d  6 lb 4 oz (2.835 kg) F Vag-Spont EPI N STEVEN       GYNE HISTORY:         Menarche: 12 (2025 10:23 AM)  Period Cycle (Days): 28 (2025 10:23 AM)  Period Duration (Days): 5 (2025 10:23 AM)  Period Flow: moderate (2025 10:23 AM)  Use of Birth Control (if yes, specify type): Tubal Ligation (2025 10:23 AM)  Hx Prior Abnormal Pap: No (2025 10:23 AM)  Pap Date: 23 (2025 10:23 AM)  Pap Result Notes: wnl (2025 10:23 AM)        Latest Ref Rng & Units 2023     3:20 PM 2020     3:44 PM   RECENT PAP RESULTS   INTERPRETATION/RESULT: Negative for intraepithelial lesion or malignancy Negative for intraepithelial lesion or malignancy  Negative for intraepithelial lesion or malignancy    HPV Negative Negative  Negative          MEDICAL HISTORY:     Past Medical History[1]    SURGICAL HISTORY:     Past Surgical History[2]    SOCIAL HISTORY:     Short Social Hx on File[3]     FAMILY HISTORY:     Family History[4]    MEDICATIONS:     Medications - Current[5]    ALLERGIES:     Allergies[6]      REVIEW OF SYSTEMS:     Constitutional:    denies fever / chills  Eyes:     denies blurred or double vision  Cardiovascular:  denies chest pain or  palpitations  Respiratory:    denies shortness of breath  Gastrointestinal:  denies severe abdominal pain, frequent diarrhea or constipation, nausea / vomiting  Genitourinary:    denies dysuria, bothersome incontinence  Skin/Breast:   denies any breast pain, lumps, or discharge  Neurological:    denies frequent severe headaches  Psychiatric:   denies depression or anxiety, thoughts of harming self or others  Heme/Lymph:    denies easy bruising or bleeding      PHYSICAL EXAM:   Blood pressure 117/74, pulse 81, height 5' 3\" (1.6 m), weight 191 lb (86.6 kg), last menstrual period 03/15/2025, not currently breastfeeding.  Constitutional:  well developed, well nourished  Pelvic Exam:  External Genitalia:  normal appearance, hair distribution, and no lesions  Urethral Meatus:   normal in size, location, without lesions   Bladder:    no fullness, masses or tenderness  Vagina:    normal appearance without lesions, no abnormal discharge  Cervix:    No lesions, normal friability   Uterus:    normal in size, 8 wk sized, normal contour, position, mobility, without  motion tenderness  Adnexa:   normal without masses or tenderness  Perineum:   normal  Anus: no hemorroids         ASSESSMENT & PLAN:     Anette was seen today for post-op.    Diagnoses and all orders for this visit:    Postop check          FOLLOW-UP     No follow-ups on file.      David Zamora MD  2025       [1]   Past Medical History:   Asthma (HCC)    Decorative tattoo    Esophageal reflux    Hyperlipidemia     (normal spontaneous vaginal delivery) (HCC)     (normal spontaneous vaginal delivery) (HCC)     (normal spontaneous vaginal delivery) (HCC)    Psoriasis    Visual impairment    glasses   [2]   Past Surgical History:  Procedure Laterality Date    Endometrial ablation  2025    Hemorrhoidectomy  2019    L anterior midline    Removal of ovarian cyst(s)      Tubal ligation     [3]   Social History  Socioeconomic History     Marital status:     Number of children: 3   Occupational History    Occupation: [employer]     Employer: [employer]    Tobacco Use    Smoking status: Former     Current packs/day: 0.00     Types: Cigarettes     Quit date: 2016     Years since quittin.0     Passive exposure: Never    Smokeless tobacco: Never   Vaping Use    Vaping status: Never Used   Substance and Sexual Activity    Alcohol use: No     Alcohol/week: 0.0 standard drinks of alcohol    Drug use: No    Sexual activity: Yes     Birth control/protection: Condom   Other Topics Concern    Caffeine Concern Yes     Comment: coffee,soda    Grew up on a farm No    History of tanning Yes    Outdoor occupation No    Pt has a pacemaker No    Pt has a defibrillator No    Breast feeding No    Reaction to local anesthetic No     Social Drivers of Health     Food Insecurity: Not on File (2024)    Received from [employer]    Food Insecurity     Food: 0   Transportation Needs: Not on File (2023)    Received from [employer]    Transportation Needs     Transportation: 0   Stress: Not on File (2023)    Received from [employer]    Stress     Stress: 0   Housing Stability: Not on File (2023)    Received from [employer]    Housing Stability     Housin   [4]   Family History  Problem Relation Age of Onset    Cancer Paternal Grandmother         stomach    Gastro-Intestinal Disorder Maternal Grandmother         bleeding ulcer (cause of death)    Asthma Maternal Grandmother     Genetic Disease Maternal Uncle         muscular dystrophy   [5]   Current Outpatient Medications:     dexamethasone 6 MG Oral Tab, , Disp: , Rfl:     azelastine 0.1 % Nasal Solution, 1 spray by Nasal route 2 (two) times daily., Disp: 1 each, Rfl: 0    dextromethorphan-guaifenesin ER (MUCINEX DM MAXIMUM STRENGTH)  MG Oral Tablet 12 Hr, Take 1 tablet by mouth every 12 (twelve) hours for 7 days., Disp: 14 tablet, Rfl: 0    ibuprofen 600 MG Oral Tab, Take 1 tablet (600 mg  total) by mouth every 6 (six) hours as needed., Disp: 12 tablet, Rfl: 0    hydrOXYzine 10 MG Oral Tab, Take 1 tablet (10 mg total) by mouth at bedtime., Disp: , Rfl:     betamethasone 0.1 % External Cream, Apply 1 Application topically daily., Disp: 15 g, Rfl: 0    clobetasol 0.05 % External Solution, Use bid  As directed to scalp, Disp: 50 mL, Rfl: 6    Minoxidil 5 % External Solution, Apply to areas of hair loss qd, Disp: 60 mL, Rfl: 1    Adalimumab (HUMIRA, 2 PEN,) 40 MG/0.4ML Subcutaneous Pen-injector Kit, Inject 40 mg into the skin every 14 (fourteen) days. Maintenance dose, Disp: 2 each, Rfl: 5    Fluocinolone Acetonide Scalp (DERMA-SMOOTHE/FS SCALP) 0.01 % External Oil, Use qhs as directed for scalp psoriasis, Disp: 118 mL, Rfl: 12    Misc. Devices Does not apply Misc, Hair prosthesis for alopecia, >60% involvement , medically necessary (Patient not taking: Reported on 4/30/2025), Disp: 1 each, Rfl: 0    tacrolimus (PROTOPIC) 0.1 % External Ointment, Use at bedtime to areas of psoriasis on face (Patient not taking: Reported on 4/30/2025), Disp: 30 g, Rfl: 1  [6] No Known Allergies

## 2025-05-22 ENCOUNTER — OFFICE VISIT (OUTPATIENT)
Dept: DERMATOLOGY CLINIC | Facility: CLINIC | Age: 35
End: 2025-05-22

## 2025-05-22 DIAGNOSIS — L63.9 ALOPECIA AREATA: Primary | ICD-10-CM

## 2025-05-22 DIAGNOSIS — L65.9 HAIR LOSS: ICD-10-CM

## 2025-05-22 DIAGNOSIS — Z51.81 MEDICATION MONITORING ENCOUNTER: ICD-10-CM

## 2025-05-22 DIAGNOSIS — Z79.60 ENCOUNTER FOR MONITORING IMMUNOSUPPRESSIVE MEDICATION THERAPY CAUSING IMMUNODEFICIENCY (HCC): ICD-10-CM

## 2025-05-22 DIAGNOSIS — L40.0 PSORIASIS VULGARIS: ICD-10-CM

## 2025-05-22 DIAGNOSIS — Z51.81 ENCOUNTER FOR MONITORING IMMUNOSUPPRESSIVE MEDICATION THERAPY CAUSING IMMUNODEFICIENCY (HCC): ICD-10-CM

## 2025-05-22 DIAGNOSIS — D84.821 ENCOUNTER FOR MONITORING IMMUNOSUPPRESSIVE MEDICATION THERAPY CAUSING IMMUNODEFICIENCY (HCC): ICD-10-CM

## 2025-05-22 PROCEDURE — 99214 OFFICE O/P EST MOD 30 MIN: CPT | Performed by: DERMATOLOGY

## 2025-05-22 PROCEDURE — 11900 INJECT SKIN LESIONS </W 7: CPT | Performed by: DERMATOLOGY

## 2025-05-22 RX ORDER — ADALIMUMAB 40MG/0.4ML
KIT SUBCUTANEOUS
COMMUNITY
Start: 2025-05-05

## 2025-05-24 NOTE — PROGRESS NOTES
Anette Perez is a 34 year old female.    Chief Complaint   Patient presents with    Alopecia     LOV 25  Pt with Alopecia areata, presents for f/u.   Rx betamethasone 0.1 % External Cream, clobetasol 0.05 % External Solution Fluocinolone Acetonide Scalp (DERMA-SMOOTHE/FS SCALP) 0.01 % External Oil and Minoxidil 5 % External Solution.  Improvement noted.             Patient has no known allergies.  Current Outpatient Medications   Medication Sig Dispense Refill    baricitinib (OLUMIANT) 2 MG Oral Tab tab Take 1 tablet (2 mg total) by mouth daily. 30 tablet 5    HUMIRA, 2 PEN, 40 MG/0.4ML Subcutaneous Auto-injector Kit       dexamethasone 6 MG Oral Tab       azelastine 0.1 % Nasal Solution 1 spray by Nasal route 2 (two) times daily. 1 each 0    ibuprofen 600 MG Oral Tab Take 1 tablet (600 mg total) by mouth every 6 (six) hours as needed. 12 tablet 0    hydrOXYzine 10 MG Oral Tab Take 1 tablet (10 mg total) by mouth at bedtime.      betamethasone 0.1 % External Cream Apply 1 Application topically daily. 15 g 0    clobetasol 0.05 % External Solution Use bid  As directed to scalp 50 mL 6    Minoxidil 5 % External Solution Apply to areas of hair loss qd 60 mL 1    Adalimumab (HUMIRA, 2 PEN,) 40 MG/0.4ML Subcutaneous Pen-injector Kit Inject 40 mg into the skin every 14 (fourteen) days. Maintenance dose 2 each 5    Fluocinolone Acetonide Scalp (DERMA-SMOOTHE/FS SCALP) 0.01 % External Oil Use qhs as directed for scalp psoriasis 118 mL 12    Misc. Devices Does not apply Misc Hair prosthesis for alopecia, >60% involvement , medically necessary (Patient not taking: Reported on 2025) 1 each 0    tacrolimus (PROTOPIC) 0.1 % External Ointment Use at bedtime to areas of psoriasis on face (Patient not taking: Reported on 2025) 30 g 1      Past Medical History:    Asthma (HCC)    Decorative tattoo    Esophageal reflux    Hyperlipidemia     (normal spontaneous vaginal delivery) (HCC)     (normal  spontaneous vaginal delivery) (HCC)     (normal spontaneous vaginal delivery) (HCC)    Psoriasis    Visual impairment    glasses      Social History:  Social History     Socioeconomic History    Marital status:     Number of children: 3   Occupational History    Occupation: Sanguine     Employer: [employer]    Tobacco Use    Smoking status: Former     Current packs/day: 0.00     Types: Cigarettes     Quit date: 2016     Years since quittin.0     Passive exposure: Never    Smokeless tobacco: Never   Vaping Use    Vaping status: Never Used   Substance and Sexual Activity    Alcohol use: No     Alcohol/week: 0.0 standard drinks of alcohol    Drug use: No    Sexual activity: Yes     Birth control/protection: Condom   Other Topics Concern    Caffeine Concern Yes     Comment: coffee,soda    Grew up on a farm No    History of tanning Yes    Outdoor occupation No    Pt has a pacemaker No    Pt has a defibrillator No    Breast feeding No    Reaction to local anesthetic No     Social Drivers of Health     Food Insecurity: Not on File (2024)    Received from [facility]    Food Insecurity     Food: 0   Transportation Needs: Not on File (2023)    Received from [facility]    Transportation Needs     Transportation: 0   Stress: Not on File (2023)    Received from [facility]    Stress     Stress: 0   Housing Stability: Not on File (2023)    Received from [employer]    Housing Stability     Housin                 Current Outpatient Medications   Medication Sig Dispense Refill    baricitinib (OLUMIANT) 2 MG Oral Tab tab Take 1 tablet (2 mg total) by mouth daily. 30 tablet 5    HUMIRA, 2 PEN, 40 MG/0.4ML Subcutaneous Auto-injector Kit       dexamethasone 6 MG Oral Tab       azelastine 0.1 % Nasal Solution 1 spray by Nasal route 2 (two) times daily. 1 each 0    ibuprofen 600 MG Oral Tab Take 1 tablet (600 mg total) by mouth every 6 (six) hours as needed. 12 tablet 0    hydrOXYzine 10 MG Oral Tab Take 1  tablet (10 mg total) by mouth at bedtime.      betamethasone 0.1 % External Cream Apply 1 Application topically daily. 15 g 0    clobetasol 0.05 % External Solution Use bid  As directed to scalp 50 mL 6    Minoxidil 5 % External Solution Apply to areas of hair loss qd 60 mL 1    Adalimumab (HUMIRA, 2 PEN,) 40 MG/0.4ML Subcutaneous Pen-injector Kit Inject 40 mg into the skin every 14 (fourteen) days. Maintenance dose 2 each 5    Fluocinolone Acetonide Scalp (DERMA-SMOOTHE/FS SCALP) 0.01 % External Oil Use qhs as directed for scalp psoriasis 118 mL 12    Misc. Devices Does not apply Misc Hair prosthesis for alopecia, >60% involvement , medically necessary (Patient not taking: Reported on 2025) 1 each 0    tacrolimus (PROTOPIC) 0.1 % External Ointment Use at bedtime to areas of psoriasis on face (Patient not taking: Reported on 2025) 30 g 1     Allergies:   No Known Allergies    Past Medical History:    Asthma (HCC)    Decorative tattoo    Esophageal reflux    Hyperlipidemia     (normal spontaneous vaginal delivery) (HCC)     (normal spontaneous vaginal delivery) (HCC)     (normal spontaneous vaginal delivery) (HCC)    Psoriasis    Visual impairment    glasses     Past Surgical History:   Procedure Laterality Date    Endometrial ablation  2025    Hemorrhoidectomy  2019    L anterior midline    Removal of ovarian cyst(s)      Tubal ligation       Social History     Socioeconomic History    Marital status:      Spouse name: Not on file    Number of children: 3    Years of education: Not on file    Highest education level: Not on file   Occupational History    Occupation: [employer]     Employer: [employer]    Tobacco Use    Smoking status: Former     Current packs/day: 0.00     Types: Cigarettes     Quit date: 2016     Years since quittin.0     Passive exposure: Never    Smokeless tobacco: Never   Vaping Use    Vaping status: Never Used   Substance and Sexual  Activity    Alcohol use: No     Alcohol/week: 0.0 standard drinks of alcohol    Drug use: No    Sexual activity: Yes     Birth control/protection: Condom   Other Topics Concern     Service Not Asked    Blood Transfusions Not Asked    Caffeine Concern Yes     Comment: coffee,soda    Occupational Exposure Not Asked    Hobby Hazards Not Asked    Sleep Concern Not Asked    Stress Concern Not Asked    Weight Concern Not Asked    Special Diet Not Asked    Back Care Not Asked    Exercise Not Asked    Bike Helmet Not Asked    Seat Belt Not Asked    Self-Exams Not Asked    Grew up on a farm No    History of tanning Yes    Outdoor occupation No    Pt has a pacemaker No    Pt has a defibrillator No    Breast feeding No    Reaction to local anesthetic No   Social History Narrative    Not on file     Social Drivers of Health     Food Insecurity: Not on File (2024)    Received from [facility]    Food Insecurity     Food: 0   Transportation Needs: Not on File (2023)    Received from [facility]    Transportation Needs     Transportation: 0   Stress: Not on File (2023)    Received from [facility]    Stress     Stress: 0   Housing Stability: Not on File (2023)    Received from [facility]    Housing Stability     Housin     Family History   Problem Relation Age of Onset    Cancer Paternal Grandmother         stomach    Gastro-Intestinal Disorder Maternal Grandmother         bleeding ulcer (cause of death)    Asthma Maternal Grandmother     Genetic Disease Maternal Uncle         muscular dystrophy                      HPI :      Chief Complaint   Patient presents with    Alopecia     LOV 25  Pt with Alopecia areata, presents for f/u.   Rx betamethasone 0.1 % External Cream, clobetasol 0.05 % External Solution Fluocinolone Acetonide Scalp (DERMA-SMOOTHE/FS SCALP) 0.01 % External Oil and Minoxidil 5 % External Solution.  Improvement noted.     Follow-up psoriasis.  Has noted in general improvement on Humira currently.   Scalp generally has improved.  Patient's noted increasing hair loss patchy for tiredly over the parietal area.  Patient under more stress recently family numbers visiting.  Notes some itching.  More scaling on the face.  Hair loss came up suddenly has noted some tenderness in these areas.  Nothing else new or different.  Had strep recently, several courses of antibiotics azithromycin and cefdinir recent labs show elevated white count, no anemia eosinophils 0.1% symptoms have improved with antibiotics.  Denies any reaction.  Patient here with daughter and     History of Present Illness    2/28/25  Anette Perez is a 34 year old female with alopecia areata and psoriasis who presents with worsening hair loss.    She is experiencing new and worsening hair loss, describing it as 'like it's falling' and noting visible spots on her scalp. She is concerned about the progression and mentions trying to cover the areas but finding it increasingly difficult. She has purchased a wig due to concerns about potentially losing all her hair. She is currently taking minoxidil tablets, two tablets, which makes her feel more sleepy the following day. Additionally, she uses topical solutions, one of which smells like alcohol, and another that is applied as drops.    She has a history of alopecia areata and psoriasis, for which she is currently taking Humira every two weeks. The medication helps significantly with her arthritis symptoms. She experiences occasional itching, particularly on her arms, which she attributes to psoriasis. She is also taking hydroxyzine for itching, which she refers to as 'the little ones' and uses them at night.    She mentions a rash that developed after a surgery in January, which she initially thought was related to the procedure. However, a doctor indicated it was not related, and she was prescribed a cream to alleviate itching, which has been somewhat effective. She describes the rash as a  'nail rash' and notes it is improving slowly.    Her blood pressure is stable and she is recovering well from her surgery, except for the ongoing hair loss, which she finds distressing.    3/21/25  Anette Perez is a 34 year old female who presents with hair regrowth concerns.    She is experiencing hair regrowth concerns, particularly noting improvement on the sides of her scalp. She describes bald spots that are noticeable when the wind hits her scalp but feels the condition is stable and improving overall. She is actively monitoring areas where new hair has not yet appeared.    Her current treatment regimen includes a liquid solution containing minoxidil, applied twice daily, which helps with itching and inflammation. She has no issues with the minoxidil and has refills available. Additionally, she is taking an oral medication, one tablet daily, which she has been using for several months to encourage hair regrowth.    Stress has been a factor in her condition, and she acknowledges the need to manage this aspect. She is pleased with the progress, noting significant hair regrowth and overall improvement in her condition.    4/11/25  Anette Perez is a 34 year old female with alopecia areata who presents for follow-up on hair regrowth and psoriasis management.    She is experiencing significant improvement in hair regrowth, with nearly all follicles showing hair. The hair is short and stubbly but remains in place. The front of her scalp is completely filled in, although the hair is fine and lighter in color. A few patches, particularly in the crown area, remain sparse.    She is managing her psoriasis with Humira. She notes that keeping her psoriasis under control is important as it may contribute to itching and potentially affect her hair regrowth. She missed a dose of Humira one week, which led to itching, but she resumed the medication the following Friday and continued with her regular  schedule. She is advised to maintain a two-week interval between doses.    Socially, she mentions her daughters and plans for the summer, indicating a supportive family environment. She is not planning any significant activities until the summer, when she hopes to visit [place].    5/25  Anette Perez is a 34 year old female with psoriasis who presents with alopecia areata.    She has experienced some regrowth at the anterior hairline but still has thirty percent scalp involvement. New patches have developed over the mid posterior scalp.    She is currently on Humira for her psoriasis and reports no arthritis at this time.      Patient presents with concerns above.    Past notes/ records and appropriate/relevant lab results including pathology and past body maps reviewed. Updated and new information noted in current visit.       ROS:    Denies any other systemic complaints.  No fevers, chills, night sweats, sensitivity to the sun, deeper lumps or bumps.  No other skin complaints.  History, medications, allergies as noted.    Physical examination: Patient  well-developed well-nourished, alert oriented in no acute distress.  Exam of involved, appropriate areas of skin performed, including scalp, head, neck, face,nails, hair, external eyes, including conjunctival mucosa, eyelids, lips, external ears, back, chest, abdomen, arms, legs, palms.  Remarkable for lesions as noted   See map for details  Patchy areas of hair loss bilateral parietal scalp right greater than left.  Erythema and scaling over the glabella nasolabial folds alar creases     ASSESSMENT AND PLAN:     Encounter Diagnoses   Name Primary?    Alopecia areata Yes    Medication monitoring encounter     Psoriasis vulgaris     Encounter for monitoring immunosuppressive medication therapy causing immunodeficiency (HCC)     Hair loss          Physical Exam  SKIN: Hair regrowth present.  Minimally along the anterior hairline approximately 70% of  scalp involved with alopecia areata some areas with regrowth with shorter hairs patient with active areas diffusely throughout the scalp.  No significant maintaining of hair growth at this time hair does grow in and then follow                      Results      Assessment & Plan      2/28/25  Alopecia Areata  Worsening alopecia areata with new and expanding areas of hair loss, likely exacerbated by recent surgery. Currently on Humira for psoriasis and psoriatic arthritis, limiting other medication options. Discussed oral minoxidil for hair growth, with potential side effects including facial hair growth. Newer medications like Rinvoq or Cibinqo might help both conditions but lack sufficient data. Stopping Humira is not advisable due to its necessity for psoriasis and arthritis control.  - Continue oral minoxidil at 2.5 mg daily, start with half a tablet and increase to a full tablet after 1-2 weeks if tolerated  - Continue topical minoxidil solutions  - Administer intralesional corticosteroid injections every 3-4 weeks  - Consider adding hydroxychloroquine if no improvement    Psoriasis  Well-controlled with Humira. Reports improvement in scalp psoriasis with no significant flares.  - Continue Humira as prescribed    Psoriatic Arthritis  Managed with Humira, effectively controlling symptoms.  - Continue Humira as prescribed    General Health Maintenance  Overall good health post-surgery.  - Monitor blood pressure regularly    Follow-up  - Follow up in 3-4 weeks for intralesional corticosteroid injections  - Monitor response to oral minoxidil and adjust dosage as needed.    3/21/25  Alopecia Areata  She is experiencing hair regrowth, particularly on the sides, indicating improvement. The condition is well-managed with minoxidil, which she tolerates without issues. Increasing the dosage is not advised due to the risk of unwanted facial hair growth. The liquid minoxidil solution, containing cortisone, is recommended  twice daily to address inflammation. Continued use is expected to promote further hair regrowth over the next four to five months.  - Continue minoxidil 5% topical solution twice daily  - Monitor hair regrowth and condition management  - Reassess in follow-up appointment  Overall significant regrowth still some patchy areas continue monitoring      4/11/25  Alopecia areata  Alopecia areata with significant regrowth; nearly 100% of follicles have hair, though it is short and fine. Some areas, particularly at the crown, still have patches. The condition is improving with current treatment, but regrowth is slow and may take a long time to fully resolve. Discussed the option of using a hair prosthesis to manage cosmetic concerns while waiting for full regrowth. Hair prosthesis is considered a prosthetic device and may be covered by insurance under durable medical equipment. It is important to avoid shaving the head or using hair extensions or glue, as these can interfere with regrowth.  - Continue Minoxidil 2.5 MG Oral daily  - Consider hair prosthesis; write prescription and advise checking with insurance for coverage  - Advise against shaving head or using hair extensions or glue  Frustrated with slow growth and widespread patchy areas of loss although regrowth is noted.  Patient tearful has been negatively impacting her self-esteem, family functions work.    Psoriasis vulgaris  Psoriasis is well-controlled with Adalimumab (Humira). She experienced itching after missing a dose, which may have exacerbated the condition. Maintaining regular dosing is crucial to prevent flare-ups. Controlling psoriasis may aid in the regrowth of hair affected by alopecia areata.  - Continue Adalimumab (Humira) 40 MG/0.4ML Subcutaneous every 14 days  - Advise maintaining regular dosing schedule  Will need quant gold over the summer ordered    Psoriatic Arthritis  Psoriatic arthritis is managed with Adalimumab (Humira). She reports  improvement in symptoms, but adherence to the medication schedule is crucial to prevent exacerbation.  - Continue Adalimumab (Humira) 40 MG/0.4ML Subcutaneous every 14 days  - Advise maintaining regular dosing schedule      5/25  Alopecia Areata  30% scalp involvement with some regrowth.  70% with active new patches on the mid posterior scalp. Discussed treatment options, including KATALINA inhibitor therapy, which she is open to.  - Initiate KATALINA inhibitor therapy for alopecia areata.    Psoriasis Vulgaris  Managed with Humira. No active psoriatic arthritis. Discussed holding Humira to manage alopecia areata with a KATALINA inhibitor.  - Hold Humira to facilitate KATALINA inhibitor therapy for alopecia areata.    Psoriatic Arthritis  No active arthritis symptoms.    Recording duration: 1 minute      Assessment / plan:  Acneform lesions have improved.  Continue monitoring.    History of psoriasis.  Improved on Humira currently still active areas on the face scalp okay   Psoriasis.  Plaque-type.  Currently less than 5% BSA involvement.  Prior to initiating Humira 30-% body surface involvement.  Will treat with medications and grid.  Overall skincare, liberal use of emollients discussed.  Consider more aggressive therapy if worsening.Patient will let us know how they are doing over the next several weeks.  Await clinical response to above therapy.  Recheck in 2-3 months if no improvement.    Widespread lesions especially over the scalp, previously currently clear    Monitor for guttate flare with recent strep.  Continue Humira  Medically necessary given widespread psoriasis worsening, scalp involvement, symptoms interfering with daily function, work, severe itching.    For psoriasis on face.  Will add tacrolimus.  Clobetasol scalp    Will need follow-up in 4 to 6 months, continue quant gold due at least annually      Alopecia areata worsening and her above.  Intralesional Kenalog 10 mg per mL total of 2.0 mL injected to above sites.   At bilateral parietal scalp autoimmune nature possibly occurring with other auto inflammatory conditions including thyroid problems as well as association with atopic conditions, potentially relapsing, recurrent persistent condition discussed.  Pathophysiology reviewed.  See grid for topicals.  May consider minoxidil.  No labs presently consider thyroid function, RACHELE, CBC if persists.  Topicals including newer medications, various forms of topical steroids, systemic medications reviewed.  Followup one month or p.r.n.  Clobetasol twice daily to patchy areas on the scalp, minoxidil solution twice daily  Await response recheck in 6 weeks if needed stress likely factor other labs reviewed consider additional labs if worsening    Pathophysiology discussed with patient.  Therapeutic options reviewed.  See  Medications in grid.  Instructions reviewed at length.     General skin care questions answered.   Reassurance regarding benign skin lesions.Signs and symptoms of skin cancer, ABCDE's of melanoma briefly reviewed.  Sunscreen use (broad-spectrum, ideally mineral, UVA UVB coverage SPF 30 or greater recommended), sun protection, encouraged.  Followup as noted in rtc or p.r.n.  Encounter Times   Including precharting, reviewing chart, prior notes obtaining history, medical exam, notes on body map, plan, counseling, discussion of therapeutic options, patient education, orders more than half total time spent in face to face time with patient.  My total time spent caring for the patient on the day of the encounter: 35 minutes       The patient indicates understanding of these issues and agrees to the plan.  The patient is asked to return as noted in follow-up /as noted above    This note was generated using Dragon voice recognition software.  Please contact me regarding any confusion resulting from errors in recognition.  Note to patient and family: The 21st Century Cures Act makes medical notes like these available to  patients. However, be advised this is a medical document. It is intended as [id number] communication and monitoring of a patient's care needs. It is written in medical language and may contain abbreviations or verbiage that are unfamiliar. It may appear blunt or direct. Medical documents are intended to carry relevant information, facts as evident and the clinical opinion of the practitioner.  Orders Placed This Encounter   Procedures    INJECTION INTO SKIN LESIONS       Meds & Refills for this Visit:   Requested Prescriptions     Signed Prescriptions Disp Refills    baricitinib (OLUMIANT) 2 MG Oral Tab tab 30 tablet 5     Sig: Take 1 tablet (2 mg total) by mouth daily.       Encounter Diagnoses   Name Primary?    Psoriasis vulgaris Yes    Medication monitoring encounter        Orders Placed This Encounter   Procedures    INJECTION INTO SKIN LESIONS       Results From Past 48 Hours:  No results found for this or any previous visit (from the past 48 hours).    Meds This Visit:      Imaging Orders:  None     Referral Orders:  No orders of the defined types were placed in this encounter.

## 2025-05-28 ENCOUNTER — TELEPHONE (OUTPATIENT)
Dept: DERMATOLOGY CLINIC | Facility: CLINIC | Age: 35
End: 2025-05-28

## 2025-05-28 ENCOUNTER — MED REC SCAN ONLY (OUTPATIENT)
Dept: DERMATOLOGY CLINIC | Facility: CLINIC | Age: 35
End: 2025-05-28

## 2025-05-28 NOTE — TELEPHONE ENCOUNTER
Form completed. Pending clinical notes and patient to complete TB test. Staff, once notes completed and TB resulted, please fax all pertinent information to Blanca g[facility] for assistance with PA. Placed in triage bin.

## 2025-05-29 NOTE — TELEPHONE ENCOUNTER
Dr. Alejandro - please let staff know once office visit notes are signed and ok to send to [facility] g[facility].  Thank you.

## 2025-05-29 NOTE — TELEPHONE ENCOUNTER
Fax from [facility]    placed fax in pa inbox    Missing information    Medical history and medication list

## 2025-06-02 NOTE — TELEPHONE ENCOUNTER
LOV notes faxed to missy at [facility] with note atatched that pt will stop her humira once she starts olumiant

## 2025-06-06 ENCOUNTER — LAB ENCOUNTER (OUTPATIENT)
Dept: LAB | Age: 35
End: 2025-06-06
Attending: DERMATOLOGY
Payer: MEDICAID

## 2025-06-06 DIAGNOSIS — Z79.60 ENCOUNTER FOR MONITORING IMMUNOSUPPRESSIVE MEDICATION THERAPY CAUSING IMMUNODEFICIENCY (HCC): ICD-10-CM

## 2025-06-06 DIAGNOSIS — Z51.81 ENCOUNTER FOR MONITORING IMMUNOSUPPRESSIVE MEDICATION THERAPY CAUSING IMMUNODEFICIENCY (HCC): ICD-10-CM

## 2025-06-06 DIAGNOSIS — D84.821 ENCOUNTER FOR MONITORING IMMUNOSUPPRESSIVE MEDICATION THERAPY CAUSING IMMUNODEFICIENCY (HCC): ICD-10-CM

## 2025-06-06 DIAGNOSIS — L40.0 PSORIASIS VULGARIS: ICD-10-CM

## 2025-06-06 PROCEDURE — 86480 TB TEST CELL IMMUN MEASURE: CPT

## 2025-06-06 PROCEDURE — 36415 COLL VENOUS BLD VENIPUNCTURE: CPT

## 2025-06-09 LAB
M TB IFN-G CD4+ T-CELLS BLD-ACNC: 0.06 IU/ML
M TB TUBERC IFN-G BLD QL: NEGATIVE
M TB TUBERC IGNF/MITOGEN IGNF CONTROL: >10 IU/ML
QFT TB1 AG MINUS NIL: 0 IU/ML
QFT TB2 AG MINUS NIL: 0 IU/ML

## 2025-06-10 ENCOUNTER — PATIENT MESSAGE (OUTPATIENT)
Dept: DERMATOLOGY CLINIC | Facility: CLINIC | Age: 35
End: 2025-06-10

## 2025-06-10 NOTE — PROGRESS NOTES
QuantiFERON gold is negative patient may continue with their biologic medication.  Please proceed with authorizations as necessary.  Patient starting Olumiant, plan is stop Humira while on Olumiant

## 2025-06-11 NOTE — DISCHARGE INSTRUCTIONS
Home Care Instructions Following Your Laparoscopic Procedure     Anette-  We hope you were pleased with your care at [facility].  We wish you the best outcome and overall experience with your operation.  These instructions will help to minimize pain, limit the risk for infection, and improve the likelihood of a successful result.    What to Expect:  Expect some vaginal bleeding and spotting a few days after your procedure. The bleeding might be similar to a regular period.  Call the office, if you experience heavy bleeding ( saturating one pad each hour for two consecutive hours )      Bathing/Showers  You may resume showers tomorrow  No baths, swimming, or hot tubs for two weeks or until cleared by your surgeon    Wound Care  Itching, bruising, a \"pulling\" sensation, and numbness around the incision are typical. The following instructions will promote proper healing and help to prevent infection  Leave dermabond (skin glue) in place  Skin glue will come off on its own    Over-The-Counter Medication  Non-prescription anti-inflammatory medications can also help to ease the pain.  You may take Aleve or ibuprofen   Take as directed on the bottle  Drink a full glass of water with the medication    Home Medication  Resume your home medications as instructed    Diet  Resume your normal diet    Activity  No strenuous activity or heavy lifting for two weeks  You may go up and down the stairs as tolerated.    You cannot return to work, if your work requires strenuous activity for two weeks  No physical exercise, sports, or gym workouts for two weeks  No sexual activity for 1 week    Return to Work or School  You may return to work in two days, if your work does not involve strenuous activity ( ex: office work).  Contact your gynecologist's office, if you need a medical note.  You may return to school in two days  No gym class or sports for two weeks.    Follow-up Appointment with Your Gynecologist  Please call the  Received platelet count 16,000.  Patient scheduled to receive platelets on 6/12   office as soon as possible for an appointment in two weeks   Verify your appointment date, day, time, and location.  At your 1st postoperative office visit:  Your wounds will be evaluated, healing assessed, and any additional concerns and instructions will be discussed.    Questions or Concerns  Please call the office if you experience severe pain not controlled by pain medication, swelling, heavy bleeding, fever, or other concerns.    If your call is made after office hours, a physician on-call will be available to help you.  There is always a doctor from the group covering our gynecology patients, if your doctor is unavailable.       HOME INSTRUCTIONS  [facility] HOME CARE INSTRUCTIONS: POST-OP ANESTHESIA  The medication that you received for sedation or general anesthesia can last up to 24 hours. Your judgment and reflexes may be altered, even if you feel like your normal self.      We Recommend:   Do not drive any motor vehicle or bicycle   Avoid mowing the lawn, playing sports, or working with power tools/applicances (power saws, electric knives or mixers)   That you have someone stay with you on your first night home   Do not drink alcohol or take sleeping pills or tranquilizers   Do not sign legal documents within 24 hours of your procedure   If you had a nerve block for your surgery, take extra care not to put any pressure on your arm or hand for 24 hours    It is normal:  For you to have a sore throat if you had a breathing tube during surgery (while you were asleep!). The sore throat should get better within 48 hours. You can gargle with warm salt water (1/2 tsp in 4 oz warm water) or use a throat lozenge for comfort  To feel muscle aches or soreness especially in the abdomen, chest or neck. The achy feeling should go away in the next 24 hours  To feel weak, sleepy or \"wiped out\". Your should start feeling better in the next 24 hours.   To experience mild discomforts such as sore lip or tongue, headache,  cramps, gas pains or a bloated feeling in your abdomen.   To experience mild back pain or soreness for a day or two if you had spinal or epidural anesthesia.   If you had laparoscopic surgery, to feel shoulder pain or discomfort on the day of surgery.   For some patients to have nausea after surgery/anesthesia    If you feel nausea or experience vomiting:   Try to move around less.   Eat less than usual or drink only liquids until the next morning   Nausea should resolve in about 24 hours    If you have a problem when you are at home:    Call your surgeons office   Discharge Instructions: After Your Surgery  You’ve just had surgery. During surgery, you were given medicine called anesthesia to keep you relaxed and free of pain. After surgery, you may have some pain or nausea. This is common. Here are some tips for feeling better and getting well after surgery.   Going home  Your healthcare provider will show you how to take care of yourself when you go home. They'll also answer your questions. Have an adult family member or friend drive you home. For the first 24 hours after your surgery:   Don't drive or use heavy equipment.  Don't make important decisions or sign legal papers.  Take medicines as directed.  Don't drink alcohol.  Have someone stay with you, if needed. They can watch for problems and help keep you safe.  Be sure to go to all follow-up visits with your healthcare provider. And rest after your surgery for as long as your provider tells you to.   Coping with pain  If you have pain after surgery, pain medicine will help you feel better. Take it as directed, before pain becomes severe. Also, ask your healthcare provider or pharmacist about other ways to control pain. This might be with heat, ice, or relaxation. And follow any other instructions your surgeon or nurse gives you.      Stay on schedule with your medicine.     Tips for taking pain medicine  To get the best relief possible, remember these  points:   Pain medicines can upset your stomach. Taking them with a little food may help.  Most pain relievers taken by mouth need at least 20 to 30 minutes to start to work.  Don't wait till your pain becomes severe before you take your medicine. Try to time your medicine so that you can take it before starting an activity. This might be before you get dressed, go for a walk, or sit down for dinner.  Constipation is a common side effect of some pain medicines. Call your healthcare provider before taking any medicines such as laxatives or stool softeners to help ease constipation. Also ask if you should skip any foods. Drinking lots of fluids and eating foods such as fruits and vegetables that are high in fiber can also help. Remember, don't take laxatives unless your surgeon has prescribed them.  Drinking alcohol and taking pain medicine can cause dizziness and slow your breathing. It can even be deadly. Don't drink alcohol while taking pain medicine.  Pain medicine can make you react more slowly to things. Don't drive or run machinery while taking pain medicine.  Your healthcare provider may tell you to take acetaminophen to help ease your pain. Ask them how much you're supposed to take each day. Acetaminophen or other pain relievers may interact with your prescription medicines or other over-the-counter (OTC) medicines. Some prescription medicines have acetaminophen and other ingredients in them. Using both prescription and OTC acetaminophen for pain can cause you to accidentally overdose. Read the labels on your OTC medicines with care. This will help you to clearly know the list of ingredients, how much to take, and any warnings. It may also help you not take too much acetaminophen. If you have questions or don't understand the information, ask your pharmacist or healthcare provider to explain it to you before you take the OTC medicine.   Managing nausea  Some people have an upset stomach (nausea) after  surgery. This is often because of anesthesia, pain, or pain medicine, less movement of food in the stomach, or the stress of surgery. These tips will help you handle nausea and eat healthy foods as you get better. If you were on a special food plan before surgery, ask your healthcare provider if you should follow it while you get better. Check with your provider on how your eating should progress. It may depend on the surgery you had. These general tips may help:   Don't push yourself to eat. Your body will tell you when to eat and how much.  Start off with clear liquids and soup. They're easier to digest.  Next try semi-solid foods as you feel ready. These include mashed potatoes, applesauce, and gelatin.  Slowly move to solid foods. Don’t eat fatty, rich, or spicy foods at first.  Don't force yourself to have 3 large meals a day. Instead eat smaller amounts more often.  Take pain medicines with a small amount of solid food, such as crackers or toast. This helps prevent nausea.  When to call your healthcare provider  Call your healthcare provider right away if you have any of these:   You still have too much pain, or the pain gets worse, after taking the medicine. The medicine may not be strong enough. Or there may be a complication from the surgery.  You feel too sleepy, dizzy, or groggy. The medicine may be too strong.  Side effects such as nausea or vomiting. Your healthcare provider may advise taking other medicines to .  Skin changes such as rash, itching, or hives. This may mean you have an allergic reaction. Your provider may advise taking other medicines.  The incision looks different (for instance, part of it opens up).  Bleeding or fluid leaking from the incision site, and weren't told to expect that.  Fever of 100.4°F (38°C) or higher, or as directed by your provider.  Call 911  Call 911 right away if you have:   Trouble breathing  Facial swelling    If you have obstructive sleep apnea   You were given  anesthesia medicine during surgery to keep you comfortable and free of pain. After surgery, you may have more apnea spells because of this medicine and other medicines you were given. The spells may last longer than normal.    At home:  Keep using the continuous positive airway pressure (CPAP) device when you sleep. Unless your healthcare provider tells you not to, use it when you sleep, day or night. CPAP is a common device used to treat obstructive sleep apnea.  Talk with your provider before taking any pain medicine, muscle relaxants, or sedatives. Your provider will tell you about the possible dangers of taking these medicines.  Contact your provider if your sleeping changes a lot even when taking medicines as directed.  [employer] last reviewed this educational content on 10/1/2021  © 3417-6779 [employer]. All rights reserved. This information is not intended as a substitute for professional medical care. Always follow your healthcare professional's instructions.

## 2025-07-29 DIAGNOSIS — Z79.60 ENCOUNTER FOR MONITORING IMMUNOSUPPRESSIVE MEDICATION THERAPY CAUSING IMMUNODEFICIENCY (HCC): Primary | ICD-10-CM

## 2025-07-29 DIAGNOSIS — D84.821 ENCOUNTER FOR MONITORING IMMUNOSUPPRESSIVE MEDICATION THERAPY CAUSING IMMUNODEFICIENCY (HCC): Primary | ICD-10-CM

## 2025-07-29 DIAGNOSIS — L63.9 ALOPECIA AREATA: ICD-10-CM

## 2025-07-29 DIAGNOSIS — Z51.81 ENCOUNTER FOR MONITORING IMMUNOSUPPRESSIVE MEDICATION THERAPY CAUSING IMMUNODEFICIENCY (HCC): Primary | ICD-10-CM

## 2025-07-29 DIAGNOSIS — L40.0 PSORIASIS VULGARIS: ICD-10-CM

## 2025-07-29 DIAGNOSIS — Z51.81 MEDICATION MONITORING ENCOUNTER: ICD-10-CM

## 2025-07-29 RX ORDER — ADALIMUMAB 40MG/0.4ML
KIT SUBCUTANEOUS
Qty: 2 EACH | Refills: 5 | Status: SHIPPED | OUTPATIENT
Start: 2025-07-29

## (undated) DEVICE — KIT HYSTEROSCOPIC PROC INCL INFLO OUTFLO TB

## (undated) DEVICE — SOFT TISSUE SHAVER MINI: Brand: TRUCLEAR

## (undated) DEVICE — PAD POS 36IN DISP SURGYPAD

## (undated) DEVICE — TROCAR: Brand: KII® SLEEVE

## (undated) DEVICE — GLOVE SUR 7.5 SENSICARE PI PIP GRN PWD F

## (undated) DEVICE — DRAPE, LAPAROSCOPY PELVISCOPY: Brand: MEDLINE

## (undated) DEVICE — INJECTOR MANIP L13CM DIA5MM BLLN TIP KRONNER

## (undated) DEVICE — HANDPIECE ABLAT DIA6MM ENDOMET IMPED CTRL DEV

## (undated) DEVICE — [HIGH FLOW INSUFFLATOR,  DO NOT USE IF PACKAGE IS DAMAGED,  KEEP DRY,  KEEP AWAY FROM SUNLIGHT,  PROTECT FROM HEAT AND RADIOACTIVE SOURCES.]: Brand: PNEUMOSURE

## (undated) DEVICE — 1016 S-DRAPE IRRIG POUCH 10/BOX: Brand: STERI-DRAPE™

## (undated) DEVICE — DRAPE SHEET LAPAROTOMY

## (undated) DEVICE — SOL  .9 1000ML BTL

## (undated) DEVICE — ARM DRAPE

## (undated) DEVICE — CANNULA SEAL

## (undated) DEVICE — ADHESIVE SKIN TOP FOR WND CLSR DERMBND ADV

## (undated) DEVICE — NDLCTR: FOAM/ADHESIVE 10CT 96/CS: Brand: MEDICAL ACTION INDUSTRIES

## (undated) DEVICE — ENCORE® LATEX MICRO SIZE 8, STERILE LATEX POWDER-FREE SURGICAL GLOVE: Brand: ENCORE

## (undated) DEVICE — JELLY,LUBE,STERILE,FLIP TOP,TUBE,2-OZ: Brand: MEDLINE

## (undated) DEVICE — PROGRASP FORCEPS: Brand: ENDOWRIST

## (undated) DEVICE — SOLUTION IRRIG 3000ML 0.9% NACL FLX CONT

## (undated) DEVICE — SOLUTION IRRIG 1000ML 0.9% NACL USP BTL

## (undated) DEVICE — TRAY SKIN PREP PVP-1

## (undated) DEVICE — VIOLET BRAIDED (POLYGLACTIN 910), SYNTHETIC ABSORBABLE SUTURE: Brand: COATED VICRYL

## (undated) DEVICE — SUCTION CANISTER, 3000CC,SAFELINER: Brand: DEROYAL

## (undated) DEVICE — GOWN,SIRUS,FAB REINF,RAGLAN,L,STERILE: Brand: MEDLINE

## (undated) DEVICE — ROBOTIC: Brand: MEDLINE INDUSTRIES, INC.

## (undated) DEVICE — ENCORE® LATEX ACCLAIM SIZE 8, STERILE LATEX POWDER-FREE SURGICAL GLOVE: Brand: ENCORE

## (undated) DEVICE — TROCAR: Brand: KII FIOS FIRST ENTRY

## (undated) DEVICE — OCCLUDER UTER DISP COLPO-PNEUMO KOH-EFFICIENT

## (undated) DEVICE — CANISTER SUCT 3000CC HI FLO DISP FOR FLD MGMT

## (undated) DEVICE — COLUMN DRAPE

## (undated) DEVICE — GLOVE SUR 7 SENSICARE PI PIP CRM PWD F

## (undated) DEVICE — LAPAROSCOPY: Brand: MEDLINE INDUSTRIES, INC.

## (undated) DEVICE — SKIN PREP TRAY 4 COMPARTM TRAY: Brand: MEDLINE INDUSTRIES, INC.

## (undated) DEVICE — STERILE SURGICAL LUBRICANT, METAL TUBE: Brand: SURGILUBE

## (undated) DEVICE — MINOR GENERAL: Brand: MEDLINE INDUSTRIES, INC.

## (undated) DEVICE — PAD ALC 43.5X24IN PREP  LF

## (undated) DEVICE — UNDYED BRAIDED (POLYGLACTIN 910), SYNTHETIC ABSORBABLE SUTURE: Brand: COATED VICRYL

## (undated) DEVICE — TIP COVER ACCESSORY

## (undated) DEVICE — SHEET,DRAPE,53X77,STERILE: Brand: MEDLINE

## (undated) DEVICE — SOL  .9 3000ML

## (undated) DEVICE — LIGASURE LAP MARYLAND 37CM

## (undated) DEVICE — LUBRICANT JLY SURGILUBE 2OZ

## (undated) DEVICE — HYSTEROSCOPY: Brand: MEDLINE INDUSTRIES, INC.

## (undated) DEVICE — VISUALIZATION SYSTEM: Brand: CLEARIFY

## (undated) DEVICE — ABDOMINAL PAD: Brand: CURITY

## (undated) DEVICE — ABSORBABLE WOUND CLOSURE DEVICE: Brand: V-LOC 180

## (undated) DEVICE — INSUFFLATION NEEDLE TO ESTABLISH PNEUMOPERITONEUM.: Brand: INSUFFLATION NEEDLE

## (undated) DEVICE — GLOVE SUR 7 SENSICARE PI MIC PIP CRM PWD F

## (undated) DEVICE — MANIPULATOR CATH ESCP KRNR

## (undated) NOTE — LETTER
8/7/2019          To Whom It May Concern:    Corinn Moritz may return to work regular duty - no restriction effective Aug. 19th    If you require additional information please contact our office.         Sincerely,    Christopher Dhaliwal MD          Document ge

## (undated) NOTE — LETTER
8/7/2019          To Whom It May Concern:    Ruby Davis is currently under my medical care and may not return to at this time. She may return to work 8/19/2019. Activity is restricted as follows: none.     If you require additional information plea

## (undated) NOTE — LETTER
[facility]  Administration of Anesthesia  1. Quentin Spotted, or _________________________________ acting on her behalf, (Patient) (Dependent/Representative) request to receive anesthesia for my pending procedure/operation/treatment.   A 6. OBSTETRIC PATIENTS: Specific risks/consequences of spinal/epidural anesthesia may include itching, low blood pressure, difficulty urinating, slowing of the baby's heart rate and headache.  Rare risks include infections, high spinal block, spinal bleeding ___________________________________________________           _____________________________________________________  Date/Time                                                                                               Responsible person in case of minor

## (undated) NOTE — LETTER
1/7/2025          To Whom It May Concern:    Anette Perez is currently under my medical care and may not return to work at this time.    Please excuse Anette for 6 days.  She may return to work on Monday January 13, 2025 .  Activity is restricted as follows: none.    If you require additional information please contact our office.        Sincerely,    Darryl Norman MD

## (undated) NOTE — LETTER
2/19/2021          To Whom It May Concern:    Angela Nichols is currently under my medical care. She may return to work on Monday 2/22/2021. Activity is restricted as follows: no lifting over 20 lbs. She may work with no restrictions in 2 weeks.      If

## (undated) NOTE — LETTER
[address]      Authorization for Surgical Operation and Procedure     Date:_11/26/20__________                                                                                                         Time 4.   Should the need arise during my operation or immediate post-operative period, I also consent to the administration of blood and/or blood products.   Further, I understand that despite careful testing and screening of blood or blood products by john 8.   I recognize that in the event my procedure results in extended X-Ray/fluoroscopy time, I may develop a skin reaction. 9.  If I have a Do Not Attempt Resuscitation (DNAR) order in place, that status will be suspended while in the operating room, proc STATEMENT OF PHYSICIAN My signature below affirms that prior to the time of the procedure; I have explained to the patient and/or his/her legal representative, the risks and benefits involved in the proposed treatment and any reasonable alternative to the

## (undated) NOTE — LETTER
Date & Time: 2/16/2020, 1:28 PM  Patient: Luisa Wells  Encounter Provider(s):    David Garrison       To Whom It May Concern:    Marilu Mcintosh was seen and treated in our department on 2/16/2020.  She should not return to work until 2/18/20 and t

## (undated) NOTE — LETTER
7/9/2020          To Whom It May Concern:    Milena Serrano is currently under medical care. Activity is restricted as follows: No lifting >15lbs. If you require additional information please contact our office.         Sincerely,    Jackie Yadavs,

## (undated) NOTE — LETTER
[facility] [place]  [address] [facility] [address]  Dept: [phone]  Dept Fax: [phone]         December 16, 2024    Patient: Anette Perez   YOB: 1990   Date of Visit: 12/16/2024       To Whom It May Concern:    Anette Perez was seen and treated in our Immediate Care department on 12/16/2024. She may return to work on Thursday Dec. 19, 2024 if feeling better .    If you have any questions or concerns, please don't hesitate to call.      Encounter Provider(s):    Chris Ram MD     11:25 AM  12/16/2024

## (undated) NOTE — LETTER
[facility]  Administration of Anesthesia  1. Phill Paul, or _________________________________ acting on her behalf, (Patient) (Dependent/Representative) request to receive anesthesia for my pending procedure/operation/treatment.   A 6. OBSTETRIC PATIENTS: Specific risks/consequences of spinal/epidural anesthesia may include itching, low blood pressure, difficulty urinating, slowing of the baby's heart rate and headache.  Rare risks include infections, high spinal block, spinal bleeding ___________________________________________________           _____________________________________________________  Date/Time                                                                                               Responsible person in case of minor

## (undated) NOTE — LETTER
[address]  [place] [address]  Dept: [phone]  Dept Fax: [phone]  Loc: [phone]      April 11, 2017    Patient: Talmadge Homans   Date of Visit: 4/11/2017       To Whom It May Concern:    Moe Lee

## (undated) NOTE — LETTER
10/20/2020          To Whom It May Concern:    Latrice Urias is currently under my medical care. Due to her current pregnant state, it is advised that she be limited to working 32 hours per week.   If you require additional information please contact our

## (undated) NOTE — LETTER
AUTHORIZATION FOR SURGICAL OPERATION OR OTHER PROCEDURE    1. I hereby authorize Dr. Norman, and [facility] staff assigned to my case to perform the following operation and/or procedure at the [facility] site:    _______________________________________________________________________________________________    Endometrial Biopsy  _______________________________________________________________________________________________    2.  My physician has explained the nature and purpose of the operation or other procedure, possible alternative methods of treatment, the risks involved, and the possibility of complication to me.  I acknowledge that no guarantee has been made as to the result that may be obtained.  3.  I recognize that, during the course of this operation, or other procedure, unforseen conditions may necessitate additional or different procedure than those listed above.  I, therefore, further authorize and request that the above named physician, his/her physician assistants or designees perform such procedures as are, in his/her professional opinion, necessary and desirable.  4.  Any tissue or organs removed in the operation or other procedure may be disposed of by and at the discretion of the [facility] and [facility].  5.  I understand that in the event of a medical emergency, I will be transported by local paramedics to [facility] or other hospital emergency department.  6.  I certify that I have read and fully understand the above consent to operation and/or other procedure.    7.  I acknowledge that my physician has explained sedation/analgesia administration to me including the risks and benefits.  I consent to the administration of sedation/analgesia as may be necessary or desirable in the judgement of my physician.    Witness signature: ___________________________________________________ Date:  ______/______/_____                     Time:  ________ A.M.  P.M.       Patient Name:  ______________________________________________________  (please print)      Patient signature:  ___________________________________________________             Relationship to Patient:           []  Parent    Responsible person                          []  Spouse  In case of minor or                    [] Other  _____________   Incompetent name:  __________________________________________________                               (please print)      _____________      Responsible person  In case of minor or  Incompetent signature:  _______________________________________________    Statement of Physician  My signature below affirms that prior to the time of the procedure, I have explained to the patient and/or his/her guardian, the risks and benefits involved in the proposed treatment and any reasonable alternative to the proposed treatment.  I have also explained the risks and benefits involved in the refusal of the proposed treatment and have answered the patient's questions.                        Date:  ______/______/_______  Provider                      Signature:  __________________________________________________________       Time:  ___________ A.M    P.M.

## (undated) NOTE — LETTER
Date & Time: 8/30/2018, 1:34 PM  Patient: [facility]  Encounter Provider(s):    SUZETTE Alcantar       To Whom It May Concern:    James Lagos was seen and treated in our department on 8/30/2018. She should not return to work until 9/1/18.

## (undated) NOTE — LETTER
Date & Time: 3/7/2025, 10:23 AM  Patient: Anette Perez  Encounter Provider(s):    Ruby Barrera DO       To Whom It May Concern:    Anette Perez was seen and treated in our department on 3/7/2025. She can return to work once symptoms have improved and/or fever free for 24 hours.    If you have any questions or concerns, please do not hesitate to call.        _____________________________  Physician/APC Signature

## (undated) NOTE — LETTER
9/24/2020              [address] [facility] [address] [facility] [id number]         To Whom It May Concern,      Otto Benz is currently under my medical care due to pregnancy.  She started her pregnancy care with our clinic on 05

## (undated) NOTE — MR AVS SNAPSHOT
[facility]  [address]  [address] [facility]  [phone]               Thank you for choosing us for your health care visit with Bernice Bryant. DO Debbi.   We are glad to serve you and happy to provide you with this sum Fully enjoy your food when eating. Don’t eat while distracted and slow down. Avoid over sized portions. Don’t eat while when you’re bored.      EAT THESE FOODS MORE OFTEN: EAT THESE FOODS LESS OFTEN:   Make half your plate fruits and vegetables Highly

## (undated) NOTE — LETTER
6/26/2019          To Whom It May Concern:    Geri Raines is currently under my medical care and is scheduled for surgery July 23, 2019. She may require up to 14 days (2 weeks) for recovery.   A return to work will be provided at her post op appointme

## (undated) NOTE — LETTER
Date: 10/24/2018    Patient Name: Slava Guo          To Whom it may concern: This letter has been written at the patient's request. The above patient was seen at the [facility] for treatment of a medical condition.     This patient ma

## (undated) NOTE — LETTER
[facility] [place]  [address] [facility] [address]  Dept: [phone]  Dept Fax: [phone]         March 7, 2025    Patient: Anette Perez   YOB: 1990   Date of Visit: 3/7/2025       To Whom It May Concern:    Anette Perez was seen and treated in our Immediate Care department on 3/7/2025.  She was diagnosed with influenza A.  She may return to work on when she is fever free and feeling better .    If you have any questions or concerns, please don't hesitate to call.      Encounter Provider(s):    Ruby Barrera DO     10:26 AM  3/7/2025
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
## (undated) NOTE — LETTER
4/25/2024              [place] [address]         Dear Anette,    Our records indicate that the tests ordered for you by Darryl Norman MD  have not been done.  If you have, in fact, already completed the tests or you do not wish to have the tests done, please contact our office at THE NUMBER LISTED BELOW.  Otherwise, please proceed with the testing.  Enclosed is a duplicate order for your convenience.    Procedure: MRI PELVIS (W+WO)(CPT=72197) [[id number]] ([id number])         Sincerely,    Darryl Norman MD  [facility] [place]  [address] [place] [id number] [phone]

## (undated) NOTE — LETTER
Date & Time: 4/29/2025, 3:31 PM  Patient: Anette Perez  Encounter Provider(s):    Lali Bass, Adele Dao PA       To Whom It May Concern:    Anette Perez was seen and treated in our department on 4/29/2025. She should not return to work until 1-3 days .    If you have any questions or concerns, please do not hesitate to call.        ____________S.Kali_________________  Physician/APC Signature

## (undated) NOTE — LETTER
[address]      Authorization for Surgical Operation and Procedure     Date:11/30/2020___________                                                                                                         Ti 4.   Should the need arise during my operation or immediate post-operative period, I also consent to the administration of blood and/or blood products.   Further, I understand that despite careful testing and screening of blood or blood products by john 8.   I recognize that in the event my procedure results in extended X-Ray/fluoroscopy time, I may develop a skin reaction. 9.  If I have a Do Not Attempt Resuscitation (DNAR) order in place, that status will be suspended while in the operating room, proc STATEMENT OF PHYSICIAN My signature below affirms that prior to the time of the procedure; I have explained to the patient and/or his/her legal representative, the risks and benefits involved in the proposed treatment and any reasonable alternative to the

## (undated) NOTE — LETTER
MINOR CASE LETTER      12/12/2024        Dear Anette,    Your are having a robotic ovarian cystectomy and oopherectomy on Wednesday, January 8th, 2025 at 7:30AM. Please arrive to the hospital 2 hours early.     Do not eat or drink anything (including water) after midnight the night before surgery.    If your procedure is scheduled later in the day, then nothing by mouth for 6 hours before arrival to the hospital.    You are to call this office if you have any cold or flu symptoms 2 days before your scheduled surgery.    Please avoid aspirin 7 days before surgery.  Avoid Ibuprofen, Motrin, Aleve, or Naprosyn for 3 days before surgery.    You will be contacted by PreAdmission Testing (PAT) usually within the week before surgery.  They will take a short medical history and let you know if any preoperative testing is needed.    Contact your insurance company to let them know you are having a robotic ovarian cystectomy and oopherectomy done. If you have an HMO or EPO insurance, we will initiate the referral for you.    Please make arrangements for someone to drive you home after your surgery.    Call our office now to schedule your post-operative appointment for 2 weeks after surgery.    Please feel free to contact our office at [phone] if you have any questions regarding these instructions or your procedure.      Sincerely,    Darryl Norman MD  [facility] - OB/GYN  [address]  [facility] [id number] [phone]

## (undated) NOTE — LETTER
11/14/2018              [address] [place] [id number]           To whom it may concern:        Ashley Ortiz is under my medical care. Please excuse Ashley Ortiz from work for 4 additional weeks due to foot injury.       [facility]

## (undated) NOTE — LETTER
12/5/2018          To Whom It May Concern:    Ruby Davis is currently under my medical care and may not return to work on Monday, 12/10/18, with no restrictions. If you require additional information please contact our office.         Sincerely,    SANCHEZ

## (undated) NOTE — LETTER
MINOR CASE LETTER      1/25/2021        Dear Mary Arias,    Your are having a Laparoscopic bilateral salpingectomy with possible laparotomy on FEB 18 th at 8:30am.    Do not eat or drink anything (including water) after midnight the night before surgery.     If

## (undated) NOTE — LETTER
Date & Time: 5/5/2020, 11:21 PM  Patient: Geri Raines  Encounter Provider(s):    Teo Tobin MD       To Whom It May Concern:    Otto Benz was seen and treated in our department on 5/5/2020. Please excuse Cal Sabillon from work 5/5-5/8.  May return 5/9

## (undated) NOTE — LETTER
11/13/2020          To Whom It May Concern:    Ruby Davis is currently under my medical care. Her due date is 12/5/2020.    She will not be able to return to work starting this Monday 11/16/2020 as she would benefit from not working to avoid her exposu

## (undated) NOTE — LETTER
9/24/2020              [address] [facility] [address] [facility] [address]         To Whom It May Concern,      Jose Antonio Bell is currently under my medical care due to pregnancy.  She started her pregnancy care with our clinic on 05